# Patient Record
Sex: MALE | Race: WHITE | NOT HISPANIC OR LATINO | Employment: PART TIME | URBAN - NONMETROPOLITAN AREA
[De-identification: names, ages, dates, MRNs, and addresses within clinical notes are randomized per-mention and may not be internally consistent; named-entity substitution may affect disease eponyms.]

---

## 2017-09-30 ENCOUNTER — APPOINTMENT (OUTPATIENT)
Dept: ULTRASOUND IMAGING | Facility: HOSPITAL | Age: 42
End: 2017-09-30

## 2017-09-30 ENCOUNTER — APPOINTMENT (OUTPATIENT)
Dept: GENERAL RADIOLOGY | Facility: HOSPITAL | Age: 42
End: 2017-09-30

## 2017-09-30 ENCOUNTER — HOSPITAL ENCOUNTER (OUTPATIENT)
Facility: HOSPITAL | Age: 42
Setting detail: OBSERVATION
Discharge: HOME | End: 2017-10-02
Attending: EMERGENCY MEDICINE | Admitting: INTERNAL MEDICINE

## 2017-09-30 DIAGNOSIS — I20.0 UNSTABLE ANGINA (HCC): ICD-10-CM

## 2017-09-30 DIAGNOSIS — R07.9 CHEST PAIN, UNSPECIFIED TYPE: Primary | ICD-10-CM

## 2017-09-30 PROBLEM — I25.2 HISTORY OF MYOCARDIAL INFARCTION: Status: ACTIVE | Noted: 2017-09-30

## 2017-09-30 LAB
ALBUMIN SERPL-MCNC: 4.3 G/DL (ref 3.5–5)
ALBUMIN/GLOB SERPL: 1.3 G/DL (ref 1–2)
ALP SERPL-CCNC: 89 U/L (ref 38–126)
ALT SERPL W P-5'-P-CCNC: 55 U/L (ref 13–69)
ANION GAP SERPL CALCULATED.3IONS-SCNC: 17.4 MMOL/L
AST SERPL-CCNC: 31 U/L (ref 15–46)
BASOPHILS # BLD AUTO: 0.04 10*3/MM3 (ref 0–0.2)
BASOPHILS NFR BLD AUTO: 0.6 % (ref 0–2.5)
BILIRUB SERPL-MCNC: 0.7 MG/DL (ref 0.2–1.3)
BUN BLD-MCNC: 16 MG/DL (ref 7–20)
BUN/CREAT SERPL: 20 (ref 6.3–21.9)
CALCIUM SPEC-SCNC: 9.3 MG/DL (ref 8.4–10.2)
CHLORIDE SERPL-SCNC: 101 MMOL/L (ref 98–107)
CHOLEST SERPL-MCNC: 193 MG/DL (ref 0–199)
CO2 SERPL-SCNC: 28 MMOL/L (ref 26–30)
CREAT BLD-MCNC: 0.8 MG/DL (ref 0.6–1.3)
DEPRECATED RDW RBC AUTO: 40.8 FL (ref 37–54)
EOSINOPHIL # BLD AUTO: 0.26 10*3/MM3 (ref 0–0.7)
EOSINOPHIL NFR BLD AUTO: 3.6 % (ref 0–7)
ERYTHROCYTE [DISTWIDTH] IN BLOOD BY AUTOMATED COUNT: 13.2 % (ref 11.5–14.5)
GFR SERPL CREATININE-BSD FRML MDRD: 105 ML/MIN/1.73
GLOBULIN UR ELPH-MCNC: 3.3 GM/DL
GLUCOSE BLD-MCNC: 209 MG/DL (ref 74–98)
HCT VFR BLD AUTO: 46 % (ref 42–52)
HDLC SERPL-MCNC: 28 MG/DL (ref 40–60)
HGB BLD-MCNC: 15.4 G/DL (ref 14–18)
HOLD SPECIMEN: NORMAL
HOLD SPECIMEN: NORMAL
IMM GRANULOCYTES # BLD: 0.06 10*3/MM3 (ref 0–0.06)
IMM GRANULOCYTES NFR BLD: 0.8 % (ref 0–0.6)
LDLC SERPL CALC-MCNC: ABNORMAL MG/DL (ref 0–99)
LDLC/HDLC SERPL: ABNORMAL {RATIO}
LYMPHOCYTES # BLD AUTO: 1.54 10*3/MM3 (ref 0.6–3.4)
LYMPHOCYTES NFR BLD AUTO: 21.2 % (ref 10–50)
MAGNESIUM SERPL-MCNC: 1.8 MG/DL (ref 1.6–2.3)
MCH RBC QN AUTO: 29.1 PG (ref 27–31)
MCHC RBC AUTO-ENTMCNC: 33.5 G/DL (ref 30–37)
MCV RBC AUTO: 86.8 FL (ref 80–94)
MONOCYTES # BLD AUTO: 0.73 10*3/MM3 (ref 0–0.9)
MONOCYTES NFR BLD AUTO: 10.1 % (ref 0–12)
NEUTROPHILS # BLD AUTO: 4.63 10*3/MM3 (ref 2–6.9)
NEUTROPHILS NFR BLD AUTO: 63.7 % (ref 37–80)
NRBC BLD MANUAL-RTO: 0 /100 WBC (ref 0–0)
NT-PROBNP SERPL-MCNC: 57.9 PG/ML (ref 0–125)
PLATELET # BLD AUTO: 173 10*3/MM3 (ref 130–400)
PMV BLD AUTO: 11.5 FL (ref 6–12)
POTASSIUM BLD-SCNC: 4.4 MMOL/L (ref 3.5–5.1)
PROT SERPL-MCNC: 7.6 G/DL (ref 6.3–8.2)
RBC # BLD AUTO: 5.3 10*6/MM3 (ref 4.7–6.1)
SODIUM BLD-SCNC: 142 MMOL/L (ref 137–145)
TRIGL SERPL-MCNC: 1051 MG/DL
TROPONIN I SERPL-MCNC: 0.01 NG/ML (ref 0–0.05)
TROPONIN I SERPL-MCNC: <0.012 NG/ML (ref 0–0.03)
TROPONIN I SERPL-MCNC: <0.012 NG/ML (ref 0–0.03)
TSH SERPL DL<=0.05 MIU/L-ACNC: 0.86 MIU/ML (ref 0.47–4.68)
VLDLC SERPL-MCNC: ABNORMAL MG/DL
WBC NRBC COR # BLD: 7.26 10*3/MM3 (ref 4.8–10.8)
WHOLE BLOOD HOLD SPECIMEN: NORMAL
WHOLE BLOOD HOLD SPECIMEN: NORMAL

## 2017-09-30 PROCEDURE — 93005 ELECTROCARDIOGRAM TRACING: CPT | Performed by: EMERGENCY MEDICINE

## 2017-09-30 PROCEDURE — 85025 COMPLETE CBC W/AUTO DIFF WBC: CPT | Performed by: EMERGENCY MEDICINE

## 2017-09-30 PROCEDURE — 96375 TX/PRO/DX INJ NEW DRUG ADDON: CPT

## 2017-09-30 PROCEDURE — 25010000002 HEPARIN (PORCINE) PER 1000 UNITS: Performed by: INTERNAL MEDICINE

## 2017-09-30 PROCEDURE — G0378 HOSPITAL OBSERVATION PER HR: HCPCS

## 2017-09-30 PROCEDURE — 0 IOPAMIDOL PER 1 ML

## 2017-09-30 PROCEDURE — 25010000002 ENOXAPARIN PER 10 MG: Performed by: EMERGENCY MEDICINE

## 2017-09-30 PROCEDURE — 25010000002 MORPHINE PER 10 MG: Performed by: FAMILY MEDICINE

## 2017-09-30 PROCEDURE — 80061 LIPID PANEL: CPT | Performed by: FAMILY MEDICINE

## 2017-09-30 PROCEDURE — 84443 ASSAY THYROID STIM HORMONE: CPT | Performed by: FAMILY MEDICINE

## 2017-09-30 PROCEDURE — 96376 TX/PRO/DX INJ SAME DRUG ADON: CPT

## 2017-09-30 PROCEDURE — 84484 ASSAY OF TROPONIN QUANT: CPT | Performed by: EMERGENCY MEDICINE

## 2017-09-30 PROCEDURE — 93458 L HRT ARTERY/VENTRICLE ANGIO: CPT | Performed by: INTERNAL MEDICINE

## 2017-09-30 PROCEDURE — 99204 OFFICE O/P NEW MOD 45 MIN: CPT | Performed by: INTERNAL MEDICINE

## 2017-09-30 PROCEDURE — 84484 ASSAY OF TROPONIN QUANT: CPT | Performed by: FAMILY MEDICINE

## 2017-09-30 PROCEDURE — 83735 ASSAY OF MAGNESIUM: CPT | Performed by: EMERGENCY MEDICINE

## 2017-09-30 PROCEDURE — 25010000002 ONDANSETRON PER 1 MG: Performed by: EMERGENCY MEDICINE

## 2017-09-30 PROCEDURE — 99284 EMERGENCY DEPT VISIT MOD MDM: CPT

## 2017-09-30 PROCEDURE — 25010000002 FUROSEMIDE PER 20 MG: Performed by: FAMILY MEDICINE

## 2017-09-30 PROCEDURE — 25010000002 FENTANYL CITRATE (PF) 100 MCG/2ML SOLUTION: Performed by: INTERNAL MEDICINE

## 2017-09-30 PROCEDURE — 25010000002 MIDAZOLAM PER 1 MG: Performed by: INTERNAL MEDICINE

## 2017-09-30 PROCEDURE — 25010000002 MORPHINE PER 10 MG: Performed by: EMERGENCY MEDICINE

## 2017-09-30 PROCEDURE — 93005 ELECTROCARDIOGRAM TRACING: CPT | Performed by: FAMILY MEDICINE

## 2017-09-30 PROCEDURE — C1769 GUIDE WIRE: HCPCS | Performed by: INTERNAL MEDICINE

## 2017-09-30 PROCEDURE — 83880 ASSAY OF NATRIURETIC PEPTIDE: CPT | Performed by: FAMILY MEDICINE

## 2017-09-30 PROCEDURE — C1894 INTRO/SHEATH, NON-LASER: HCPCS | Performed by: INTERNAL MEDICINE

## 2017-09-30 PROCEDURE — 71010 HC CHEST PA OR AP: CPT

## 2017-09-30 PROCEDURE — 0 IOPAMIDOL PER 1 ML: Performed by: INTERNAL MEDICINE

## 2017-09-30 PROCEDURE — 83036 HEMOGLOBIN GLYCOSYLATED A1C: CPT | Performed by: FAMILY MEDICINE

## 2017-09-30 PROCEDURE — 96372 THER/PROPH/DIAG INJ SC/IM: CPT

## 2017-09-30 PROCEDURE — 96374 THER/PROPH/DIAG INJ IV PUSH: CPT

## 2017-09-30 PROCEDURE — 80053 COMPREHEN METABOLIC PANEL: CPT | Performed by: EMERGENCY MEDICINE

## 2017-09-30 PROCEDURE — 99219 PR INITIAL OBSERVATION CARE/DAY 50 MINUTES: CPT | Performed by: FAMILY MEDICINE

## 2017-09-30 PROCEDURE — 93970 EXTREMITY STUDY: CPT

## 2017-09-30 RX ORDER — SODIUM CHLORIDE 0.9 % (FLUSH) 0.9 %
1-10 SYRINGE (ML) INJECTION AS NEEDED
Status: DISCONTINUED | OUTPATIENT
Start: 2017-09-30 | End: 2017-10-02 | Stop reason: HOSPADM

## 2017-09-30 RX ORDER — ONDANSETRON 4 MG/1
4 TABLET, ORALLY DISINTEGRATING ORAL EVERY 6 HOURS PRN
Status: DISCONTINUED | OUTPATIENT
Start: 2017-09-30 | End: 2017-10-02 | Stop reason: HOSPADM

## 2017-09-30 RX ORDER — CETIRIZINE HYDROCHLORIDE 10 MG/1
10 TABLET ORAL DAILY
Status: DISCONTINUED | OUTPATIENT
Start: 2017-10-01 | End: 2017-10-02 | Stop reason: HOSPADM

## 2017-09-30 RX ORDER — CARVEDILOL 12.5 MG/1
12.5 TABLET ORAL 2 TIMES DAILY
Status: DISCONTINUED | OUTPATIENT
Start: 2017-09-30 | End: 2017-10-02 | Stop reason: HOSPADM

## 2017-09-30 RX ORDER — LISINOPRIL 20 MG/1
40 TABLET ORAL DAILY
Status: DISCONTINUED | OUTPATIENT
Start: 2017-10-01 | End: 2017-10-02 | Stop reason: HOSPADM

## 2017-09-30 RX ORDER — AMLODIPINE BESYLATE 5 MG/1
10 TABLET ORAL DAILY
Status: DISCONTINUED | OUTPATIENT
Start: 2017-10-01 | End: 2017-10-02 | Stop reason: HOSPADM

## 2017-09-30 RX ORDER — ACETAMINOPHEN 325 MG/1
650 TABLET ORAL EVERY 4 HOURS PRN
Status: DISCONTINUED | OUTPATIENT
Start: 2017-09-30 | End: 2017-10-02 | Stop reason: HOSPADM

## 2017-09-30 RX ORDER — BISACODYL 10 MG
10 SUPPOSITORY, RECTAL RECTAL DAILY PRN
Status: DISCONTINUED | OUTPATIENT
Start: 2017-09-30 | End: 2017-10-02 | Stop reason: HOSPADM

## 2017-09-30 RX ORDER — LIDOCAINE HYDROCHLORIDE 10 MG/ML
INJECTION, SOLUTION INFILTRATION; PERINEURAL AS NEEDED
Status: DISCONTINUED | OUTPATIENT
Start: 2017-09-30 | End: 2017-09-30 | Stop reason: HOSPADM

## 2017-09-30 RX ORDER — NICOTINE POLACRILEX 4 MG
1 LOZENGE BUCCAL
Status: DISCONTINUED | OUTPATIENT
Start: 2017-09-30 | End: 2017-10-02 | Stop reason: HOSPADM

## 2017-09-30 RX ORDER — MIDAZOLAM HYDROCHLORIDE 1 MG/ML
INJECTION INTRAMUSCULAR; INTRAVENOUS AS NEEDED
Status: DISCONTINUED | OUTPATIENT
Start: 2017-09-30 | End: 2017-09-30 | Stop reason: HOSPADM

## 2017-09-30 RX ORDER — MORPHINE SULFATE 2 MG/ML
2 INJECTION, SOLUTION INTRAMUSCULAR; INTRAVENOUS
Status: DISCONTINUED | OUTPATIENT
Start: 2017-09-30 | End: 2017-10-02 | Stop reason: HOSPADM

## 2017-09-30 RX ORDER — ONDANSETRON 4 MG/1
4 TABLET, FILM COATED ORAL EVERY 6 HOURS PRN
Status: DISCONTINUED | OUTPATIENT
Start: 2017-09-30 | End: 2017-10-02 | Stop reason: HOSPADM

## 2017-09-30 RX ORDER — CITALOPRAM 20 MG/1
20 TABLET ORAL DAILY
Status: DISCONTINUED | OUTPATIENT
Start: 2017-10-01 | End: 2017-10-02 | Stop reason: HOSPADM

## 2017-09-30 RX ORDER — ONDANSETRON 2 MG/ML
4 INJECTION INTRAMUSCULAR; INTRAVENOUS ONCE
Status: COMPLETED | OUTPATIENT
Start: 2017-09-30 | End: 2017-09-30

## 2017-09-30 RX ORDER — ALBUTEROL SULFATE 2.5 MG/3ML
2.5 SOLUTION RESPIRATORY (INHALATION) EVERY 6 HOURS PRN
Status: DISCONTINUED | OUTPATIENT
Start: 2017-09-30 | End: 2017-10-02 | Stop reason: HOSPADM

## 2017-09-30 RX ORDER — DEXTROSE MONOHYDRATE 25 G/50ML
25 INJECTION, SOLUTION INTRAVENOUS
Status: DISCONTINUED | OUTPATIENT
Start: 2017-09-30 | End: 2017-10-02 | Stop reason: HOSPADM

## 2017-09-30 RX ORDER — ONDANSETRON 2 MG/ML
4 INJECTION INTRAMUSCULAR; INTRAVENOUS EVERY 6 HOURS PRN
Status: DISCONTINUED | OUTPATIENT
Start: 2017-09-30 | End: 2017-10-02 | Stop reason: HOSPADM

## 2017-09-30 RX ORDER — ATORVASTATIN CALCIUM 20 MG/1
20 TABLET, FILM COATED ORAL DAILY
Status: DISCONTINUED | OUTPATIENT
Start: 2017-09-30 | End: 2017-09-30 | Stop reason: SDUPTHER

## 2017-09-30 RX ORDER — NALOXONE HCL 0.4 MG/ML
0.4 VIAL (ML) INJECTION
Status: DISCONTINUED | OUTPATIENT
Start: 2017-09-30 | End: 2017-10-02 | Stop reason: HOSPADM

## 2017-09-30 RX ORDER — GABAPENTIN 300 MG/1
600 CAPSULE ORAL EVERY 12 HOURS SCHEDULED
Status: DISCONTINUED | OUTPATIENT
Start: 2017-09-30 | End: 2017-10-02 | Stop reason: HOSPADM

## 2017-09-30 RX ORDER — AMLODIPINE BESYLATE 5 MG/1
10 TABLET ORAL DAILY
Status: DISCONTINUED | OUTPATIENT
Start: 2017-10-02 | End: 2017-09-30

## 2017-09-30 RX ORDER — MORPHINE SULFATE 4 MG/ML
4 INJECTION, SOLUTION INTRAMUSCULAR; INTRAVENOUS ONCE
Status: COMPLETED | OUTPATIENT
Start: 2017-09-30 | End: 2017-09-30

## 2017-09-30 RX ORDER — ASPIRIN 325 MG
325 TABLET ORAL ONCE
Status: COMPLETED | OUTPATIENT
Start: 2017-09-30 | End: 2017-09-30

## 2017-09-30 RX ORDER — FUROSEMIDE 10 MG/ML
20 INJECTION INTRAMUSCULAR; INTRAVENOUS ONCE
Status: COMPLETED | OUTPATIENT
Start: 2017-09-30 | End: 2017-09-30

## 2017-09-30 RX ORDER — FAMOTIDINE 20 MG/1
20 TABLET, FILM COATED ORAL 2 TIMES DAILY
Status: DISCONTINUED | OUTPATIENT
Start: 2017-09-30 | End: 2017-10-02 | Stop reason: HOSPADM

## 2017-09-30 RX ORDER — NITROGLYCERIN 20 MG/100ML
5-200 INJECTION INTRAVENOUS
Status: DISCONTINUED | OUTPATIENT
Start: 2017-09-30 | End: 2017-09-30

## 2017-09-30 RX ORDER — SODIUM CHLORIDE 9 MG/ML
100 INJECTION, SOLUTION INTRAVENOUS CONTINUOUS
Status: DISCONTINUED | OUTPATIENT
Start: 2017-09-30 | End: 2017-10-02 | Stop reason: HOSPADM

## 2017-09-30 RX ORDER — SODIUM CHLORIDE 0.9 % (FLUSH) 0.9 %
10 SYRINGE (ML) INJECTION AS NEEDED
Status: DISCONTINUED | OUTPATIENT
Start: 2017-09-30 | End: 2017-10-02 | Stop reason: HOSPADM

## 2017-09-30 RX ORDER — FENTANYL CITRATE 50 UG/ML
INJECTION, SOLUTION INTRAMUSCULAR; INTRAVENOUS AS NEEDED
Status: DISCONTINUED | OUTPATIENT
Start: 2017-09-30 | End: 2017-09-30 | Stop reason: HOSPADM

## 2017-09-30 RX ORDER — ATORVASTATIN CALCIUM 10 MG/1
10 TABLET, FILM COATED ORAL NIGHTLY
Status: DISCONTINUED | OUTPATIENT
Start: 2017-10-01 | End: 2017-10-02 | Stop reason: HOSPADM

## 2017-09-30 RX ADMIN — NITROGLYCERIN 0.5 INCH: 20 OINTMENT TOPICAL at 11:03

## 2017-09-30 RX ADMIN — ONDANSETRON 4 MG: 2 INJECTION INTRAMUSCULAR; INTRAVENOUS at 11:02

## 2017-09-30 RX ADMIN — MORPHINE SULFATE 2 MG: 2 INJECTION, SOLUTION INTRAMUSCULAR; INTRAVENOUS at 14:25

## 2017-09-30 RX ADMIN — NITROGLYCERIN 1 INCH: 20 OINTMENT TOPICAL at 14:18

## 2017-09-30 RX ADMIN — FAMOTIDINE 20 MG: 20 TABLET, FILM COATED ORAL at 21:35

## 2017-09-30 RX ADMIN — LIDOCAINE HYDROCHLORIDE: 20 SOLUTION ORAL; TOPICAL at 15:11

## 2017-09-30 RX ADMIN — CARVEDILOL 12.5 MG: 12.5 TABLET, FILM COATED ORAL at 21:34

## 2017-09-30 RX ADMIN — ASPIRIN 325 MG ORAL TABLET 325 MG: 325 PILL ORAL at 08:38

## 2017-09-30 RX ADMIN — ENOXAPARIN SODIUM 140 MG: 150 INJECTION SUBCUTANEOUS at 11:03

## 2017-09-30 RX ADMIN — ATORVASTATIN CALCIUM 20 MG: 20 TABLET, FILM COATED ORAL at 12:34

## 2017-09-30 RX ADMIN — FUROSEMIDE 20 MG: 10 INJECTION, SOLUTION INTRAMUSCULAR; INTRAVENOUS at 12:34

## 2017-09-30 RX ADMIN — MORPHINE SULFATE 4 MG: 4 INJECTION, SOLUTION INTRAMUSCULAR; INTRAVENOUS at 11:03

## 2017-09-30 RX ADMIN — GABAPENTIN 600 MG: 300 CAPSULE ORAL at 21:34

## 2017-10-01 LAB
ANION GAP SERPL CALCULATED.3IONS-SCNC: 14.3 MMOL/L
BASOPHILS # BLD AUTO: 0.02 10*3/MM3 (ref 0–0.2)
BASOPHILS NFR BLD AUTO: 0.3 % (ref 0–2.5)
BUN BLD-MCNC: 14 MG/DL (ref 7–20)
BUN/CREAT SERPL: 17.5 (ref 6.3–21.9)
CALCIUM SPEC-SCNC: 9 MG/DL (ref 8.4–10.2)
CHLORIDE SERPL-SCNC: 102 MMOL/L (ref 98–107)
CO2 SERPL-SCNC: 30 MMOL/L (ref 26–30)
CREAT BLD-MCNC: 0.8 MG/DL (ref 0.6–1.3)
DEPRECATED RDW RBC AUTO: 43.2 FL (ref 37–54)
EOSINOPHIL # BLD AUTO: 0.18 10*3/MM3 (ref 0–0.7)
EOSINOPHIL NFR BLD AUTO: 2.7 % (ref 0–7)
ERYTHROCYTE [DISTWIDTH] IN BLOOD BY AUTOMATED COUNT: 13.4 % (ref 11.5–14.5)
GFR SERPL CREATININE-BSD FRML MDRD: 105 ML/MIN/1.73
GLUCOSE BLD-MCNC: 174 MG/DL (ref 74–98)
GLUCOSE BLDC GLUCOMTR-MCNC: 136 MG/DL (ref 70–130)
GLUCOSE BLDC GLUCOMTR-MCNC: 150 MG/DL (ref 70–130)
GLUCOSE BLDC GLUCOMTR-MCNC: 176 MG/DL (ref 70–130)
GLUCOSE BLDC GLUCOMTR-MCNC: 181 MG/DL (ref 70–130)
GLUCOSE BLDC GLUCOMTR-MCNC: 217 MG/DL (ref 70–130)
HCT VFR BLD AUTO: 44.9 % (ref 42–52)
HGB BLD-MCNC: 14.8 G/DL (ref 14–18)
IMM GRANULOCYTES # BLD: 0.03 10*3/MM3 (ref 0–0.06)
IMM GRANULOCYTES NFR BLD: 0.4 % (ref 0–0.6)
LIPASE SERPL-CCNC: 85 U/L (ref 23–300)
LYMPHOCYTES # BLD AUTO: 1.52 10*3/MM3 (ref 0.6–3.4)
LYMPHOCYTES NFR BLD AUTO: 22.5 % (ref 10–50)
MCH RBC QN AUTO: 29.2 PG (ref 27–31)
MCHC RBC AUTO-ENTMCNC: 33 G/DL (ref 30–37)
MCV RBC AUTO: 88.6 FL (ref 80–94)
MONOCYTES # BLD AUTO: 0.73 10*3/MM3 (ref 0–0.9)
MONOCYTES NFR BLD AUTO: 10.8 % (ref 0–12)
NEUTROPHILS # BLD AUTO: 4.27 10*3/MM3 (ref 2–6.9)
NEUTROPHILS NFR BLD AUTO: 63.3 % (ref 37–80)
NRBC BLD MANUAL-RTO: 0 /100 WBC (ref 0–0)
PLATELET # BLD AUTO: 160 10*3/MM3 (ref 130–400)
PMV BLD AUTO: 11.4 FL (ref 6–12)
POTASSIUM BLD-SCNC: 4.3 MMOL/L (ref 3.5–5.1)
RBC # BLD AUTO: 5.07 10*6/MM3 (ref 4.7–6.1)
SODIUM BLD-SCNC: 142 MMOL/L (ref 137–145)
WBC NRBC COR # BLD: 6.75 10*3/MM3 (ref 4.8–10.8)

## 2017-10-01 PROCEDURE — G0378 HOSPITAL OBSERVATION PER HR: HCPCS

## 2017-10-01 PROCEDURE — 63710000001 INSULIN ASPART PER 5 UNITS: Performed by: FAMILY MEDICINE

## 2017-10-01 PROCEDURE — 25010000002 KETOROLAC TROMETHAMINE PER 15 MG: Performed by: FAMILY MEDICINE

## 2017-10-01 PROCEDURE — 25010000002 ENOXAPARIN PER 10 MG: Performed by: FAMILY MEDICINE

## 2017-10-01 PROCEDURE — 85025 COMPLETE CBC W/AUTO DIFF WBC: CPT | Performed by: FAMILY MEDICINE

## 2017-10-01 PROCEDURE — 83690 ASSAY OF LIPASE: CPT | Performed by: FAMILY MEDICINE

## 2017-10-01 PROCEDURE — 82962 GLUCOSE BLOOD TEST: CPT

## 2017-10-01 PROCEDURE — 80048 BASIC METABOLIC PNL TOTAL CA: CPT | Performed by: FAMILY MEDICINE

## 2017-10-01 RX ORDER — KETOROLAC TROMETHAMINE 30 MG/ML
30 INJECTION, SOLUTION INTRAMUSCULAR; INTRAVENOUS ONCE
Status: COMPLETED | OUTPATIENT
Start: 2017-10-01 | End: 2017-10-01

## 2017-10-01 RX ORDER — KETOROLAC TROMETHAMINE 30 MG/ML
15 INJECTION, SOLUTION INTRAMUSCULAR; INTRAVENOUS EVERY 6 HOURS PRN
Status: DISCONTINUED | OUTPATIENT
Start: 2017-10-01 | End: 2017-10-02 | Stop reason: HOSPADM

## 2017-10-01 RX ADMIN — FAMOTIDINE 20 MG: 20 TABLET, FILM COATED ORAL at 09:46

## 2017-10-01 RX ADMIN — FAMOTIDINE 20 MG: 20 TABLET, FILM COATED ORAL at 20:43

## 2017-10-01 RX ADMIN — ATORVASTATIN CALCIUM 10 MG: 10 TABLET, FILM COATED ORAL at 20:43

## 2017-10-01 RX ADMIN — LISINOPRIL 40 MG: 20 TABLET ORAL at 09:46

## 2017-10-01 RX ADMIN — CETIRIZINE HYDROCHLORIDE 10 MG: 10 TABLET, FILM COATED ORAL at 09:46

## 2017-10-01 RX ADMIN — GABAPENTIN 600 MG: 300 CAPSULE ORAL at 09:46

## 2017-10-01 RX ADMIN — NITROGLYCERIN 1 INCH: 20 OINTMENT TOPICAL at 00:01

## 2017-10-01 RX ADMIN — AMLODIPINE BESYLATE 10 MG: 5 TABLET ORAL at 09:45

## 2017-10-01 RX ADMIN — CARVEDILOL 12.5 MG: 12.5 TABLET, FILM COATED ORAL at 20:43

## 2017-10-01 RX ADMIN — CARVEDILOL 12.5 MG: 12.5 TABLET, FILM COATED ORAL at 09:46

## 2017-10-01 RX ADMIN — SODIUM CHLORIDE 100 ML/HR: 9 INJECTION, SOLUTION INTRAVENOUS at 06:20

## 2017-10-01 RX ADMIN — CITALOPRAM HYDROBROMIDE 20 MG: 20 TABLET, FILM COATED ORAL at 09:46

## 2017-10-01 RX ADMIN — GABAPENTIN 600 MG: 300 CAPSULE ORAL at 20:43

## 2017-10-01 RX ADMIN — NITROGLYCERIN 1 INCH: 20 OINTMENT TOPICAL at 06:20

## 2017-10-01 RX ADMIN — KETOROLAC TROMETHAMINE 30 MG: 30 INJECTION, SOLUTION INTRAMUSCULAR at 11:10

## 2017-10-01 RX ADMIN — ENOXAPARIN SODIUM 40 MG: 40 INJECTION SUBCUTANEOUS at 09:46

## 2017-10-01 RX ADMIN — INSULIN ASPART 2 UNITS: 100 INJECTION, SOLUTION INTRAVENOUS; SUBCUTANEOUS at 12:00

## 2017-10-02 ENCOUNTER — APPOINTMENT (OUTPATIENT)
Dept: CARDIOLOGY | Facility: HOSPITAL | Age: 42
End: 2017-10-02
Attending: FAMILY MEDICINE

## 2017-10-02 VITALS
SYSTOLIC BLOOD PRESSURE: 134 MMHG | DIASTOLIC BLOOD PRESSURE: 94 MMHG | OXYGEN SATURATION: 97 % | RESPIRATION RATE: 18 BRPM | HEIGHT: 76 IN | TEMPERATURE: 98.3 F | WEIGHT: 315 LBS | HEART RATE: 70 BPM | BODY MASS INDEX: 38.36 KG/M2

## 2017-10-02 PROBLEM — K22.4 ESOPHAGEAL SPASM: Status: ACTIVE | Noted: 2017-10-02

## 2017-10-02 PROBLEM — I20.0 UNSTABLE ANGINA (HCC): Status: RESOLVED | Noted: 2017-09-30 | Resolved: 2017-10-02

## 2017-10-02 PROBLEM — J45.40: Status: ACTIVE | Noted: 2017-10-02

## 2017-10-02 PROBLEM — I10 ESSENTIAL HYPERTENSION: Chronic | Status: ACTIVE | Noted: 2017-10-02

## 2017-10-02 PROBLEM — R07.89 OTHER CHEST PAIN: Status: ACTIVE | Noted: 2017-10-02

## 2017-10-02 LAB
GLUCOSE BLDC GLUCOMTR-MCNC: 154 MG/DL (ref 70–130)
HBA1C MFR BLD: 7.6 % (ref 3–6)

## 2017-10-02 PROCEDURE — 90661 CCIIV3 VAC ABX FR 0.5 ML IM: CPT | Performed by: FAMILY MEDICINE

## 2017-10-02 PROCEDURE — 99217 PR OBSERVATION CARE DISCHARGE MANAGEMENT: CPT | Performed by: FAMILY MEDICINE

## 2017-10-02 PROCEDURE — 82962 GLUCOSE BLOOD TEST: CPT

## 2017-10-02 PROCEDURE — 25010000002 PNEUMOCOCCAL VAC POLYVALENT PER 0.5 ML: Performed by: FAMILY MEDICINE

## 2017-10-02 PROCEDURE — G0009 ADMIN PNEUMOCOCCAL VACCINE: HCPCS | Performed by: FAMILY MEDICINE

## 2017-10-02 PROCEDURE — G0008 ADMIN INFLUENZA VIRUS VAC: HCPCS | Performed by: FAMILY MEDICINE

## 2017-10-02 PROCEDURE — 90732 PPSV23 VACC 2 YRS+ SUBQ/IM: CPT | Performed by: FAMILY MEDICINE

## 2017-10-02 PROCEDURE — 25010000002 INFLUENZA VAC SUBUNIT QUAD 0.5 ML SUSPENSION PREFILLED SYRINGE: Performed by: FAMILY MEDICINE

## 2017-10-02 PROCEDURE — G0378 HOSPITAL OBSERVATION PER HR: HCPCS

## 2017-10-02 RX ORDER — NIFEDIPINE 30 MG/1
30 TABLET, EXTENDED RELEASE ORAL DAILY
Qty: 30 TABLET | Refills: 0 | Status: SHIPPED | OUTPATIENT
Start: 2017-10-02 | End: 2018-05-07

## 2017-10-02 RX ORDER — BUDESONIDE AND FORMOTEROL FUMARATE DIHYDRATE 160; 4.5 UG/1; UG/1
2 AEROSOL RESPIRATORY (INHALATION)
Qty: 10.2 G | Refills: 0 | Status: SHIPPED | OUTPATIENT
Start: 2017-10-02 | End: 2019-10-30

## 2017-10-02 RX ADMIN — LISINOPRIL 40 MG: 20 TABLET ORAL at 09:28

## 2017-10-02 RX ADMIN — PNEUMOCOCCAL VACCINE POLYVALENT 0.5 ML
25; 25; 25; 25; 25; 25; 25; 25; 25; 25; 25; 25; 25; 25; 25; 25; 25; 25; 25; 25; 25; 25; 25 INJECTION, SOLUTION INTRAMUSCULAR; SUBCUTANEOUS at 11:19

## 2017-10-02 RX ADMIN — FAMOTIDINE 20 MG: 20 TABLET, FILM COATED ORAL at 09:28

## 2017-10-02 RX ADMIN — CITALOPRAM HYDROBROMIDE 20 MG: 20 TABLET, FILM COATED ORAL at 09:27

## 2017-10-02 RX ADMIN — AMLODIPINE BESYLATE 10 MG: 5 TABLET ORAL at 09:27

## 2017-10-02 RX ADMIN — CARVEDILOL 12.5 MG: 12.5 TABLET, FILM COATED ORAL at 09:28

## 2017-10-02 RX ADMIN — GABAPENTIN 600 MG: 300 CAPSULE ORAL at 09:27

## 2017-10-02 RX ADMIN — CETIRIZINE HYDROCHLORIDE 10 MG: 10 TABLET, FILM COATED ORAL at 09:27

## 2017-10-02 RX ADMIN — A/SINGAPORE/GP1908/2015 IVR-180 (H1N1) (AN A/MICHIGAN/45/2015-LIKE VIRUS), A/SINGAPORE/GP2050/2015 (H3N2) (AN A/HONG KONG/4801/2014 - LIKE VIRUS), B/UTAH/9/2014 (A B/PHUKET/3073/2013-LIKE VIRUS), B/HONG KONG/259/2010 (A B/BRISBANE/60/08-LIKE VIRUS) 0.5 ML: 15; 15; 15; 15 INJECTION, SUSPENSION INTRAMUSCULAR at 11:23

## 2017-10-31 ENCOUNTER — HOSPITAL ENCOUNTER (EMERGENCY)
Facility: HOSPITAL | Age: 42
Discharge: ANOTHER HOSPITAL | End: 2017-10-31
Attending: EMERGENCY MEDICINE | Admitting: EMERGENCY MEDICINE

## 2017-10-31 ENCOUNTER — APPOINTMENT (OUTPATIENT)
Dept: MRI IMAGING | Facility: HOSPITAL | Age: 42
End: 2017-10-31

## 2017-10-31 ENCOUNTER — APPOINTMENT (OUTPATIENT)
Dept: CT IMAGING | Facility: HOSPITAL | Age: 42
End: 2017-10-31

## 2017-10-31 ENCOUNTER — HOSPITAL ENCOUNTER (INPATIENT)
Facility: HOSPITAL | Age: 42
LOS: 1 days | Discharge: HOME | End: 2017-11-01
Attending: INTERNAL MEDICINE | Admitting: HOSPITALIST

## 2017-10-31 VITALS
HEIGHT: 76 IN | WEIGHT: 315 LBS | RESPIRATION RATE: 20 BRPM | HEART RATE: 64 BPM | DIASTOLIC BLOOD PRESSURE: 83 MMHG | SYSTOLIC BLOOD PRESSURE: 124 MMHG | TEMPERATURE: 98 F | OXYGEN SATURATION: 99 % | BODY MASS INDEX: 38.36 KG/M2

## 2017-10-31 DIAGNOSIS — Z74.09 IMPAIRED MOBILITY AND ADLS: ICD-10-CM

## 2017-10-31 DIAGNOSIS — R29.90 STROKE-LIKE EPISODE: Primary | ICD-10-CM

## 2017-10-31 DIAGNOSIS — Z74.09 IMPAIRED FUNCTIONAL MOBILITY, BALANCE, GAIT, AND ENDURANCE: ICD-10-CM

## 2017-10-31 DIAGNOSIS — R13.11 ORAL PHASE DYSPHAGIA: Primary | ICD-10-CM

## 2017-10-31 DIAGNOSIS — Z78.9 IMPAIRED MOBILITY AND ADLS: ICD-10-CM

## 2017-10-31 PROBLEM — R29.898 WEAKNESS OF LEFT LEG: Status: ACTIVE | Noted: 2017-10-31

## 2017-10-31 PROBLEM — E11.9 TYPE 2 DIABETES MELLITUS (HCC): Status: ACTIVE | Noted: 2017-10-31

## 2017-10-31 PROBLEM — G51.0 FACIAL PALSY: Status: ACTIVE | Noted: 2017-10-31

## 2017-10-31 PROBLEM — R29.810 FACIAL DROOP: Status: ACTIVE | Noted: 2017-10-31

## 2017-10-31 PROBLEM — F32.A DEPRESSION: Status: ACTIVE | Noted: 2017-10-31

## 2017-10-31 LAB
ALBUMIN SERPL-MCNC: 4.2 G/DL (ref 3.5–5)
ALBUMIN/GLOB SERPL: 1.3 G/DL (ref 1–2)
ALP SERPL-CCNC: 73 U/L (ref 38–126)
ALT SERPL W P-5'-P-CCNC: 39 U/L (ref 13–69)
ANION GAP SERPL CALCULATED.3IONS-SCNC: 16.8 MMOL/L
AST SERPL-CCNC: 39 U/L (ref 15–46)
BASOPHILS # BLD AUTO: 0.02 10*3/MM3 (ref 0–0.2)
BASOPHILS NFR BLD AUTO: 0.3 % (ref 0–2.5)
BILIRUB SERPL-MCNC: 0.6 MG/DL (ref 0.2–1.3)
BUN BLD-MCNC: 22 MG/DL (ref 7–20)
BUN/CREAT SERPL: 24.4 (ref 6.3–21.9)
CALCIUM SPEC-SCNC: 9.5 MG/DL (ref 8.4–10.2)
CHLORIDE SERPL-SCNC: 107 MMOL/L (ref 98–107)
CO2 SERPL-SCNC: 23 MMOL/L (ref 26–30)
CREAT BLD-MCNC: 0.9 MG/DL (ref 0.6–1.3)
DEPRECATED RDW RBC AUTO: 41.6 FL (ref 37–54)
EOSINOPHIL # BLD AUTO: 0.26 10*3/MM3 (ref 0–0.7)
EOSINOPHIL NFR BLD AUTO: 3.6 % (ref 0–7)
ERYTHROCYTE [DISTWIDTH] IN BLOOD BY AUTOMATED COUNT: 13.2 % (ref 11.5–14.5)
GFR SERPL CREATININE-BSD FRML MDRD: 92 ML/MIN/1.73
GLOBULIN UR ELPH-MCNC: 3.2 GM/DL
GLUCOSE BLD-MCNC: 215 MG/DL (ref 74–98)
GLUCOSE BLDC GLUCOMTR-MCNC: 115 MG/DL (ref 70–130)
GLUCOSE BLDC GLUCOMTR-MCNC: 154 MG/DL (ref 70–130)
GLUCOSE BLDC GLUCOMTR-MCNC: 229 MG/DL (ref 70–130)
HCT VFR BLD AUTO: 45.3 % (ref 42–52)
HGB BLD-MCNC: 15.5 G/DL (ref 14–18)
HOLD SPECIMEN: NORMAL
IMM GRANULOCYTES # BLD: 0.06 10*3/MM3 (ref 0–0.06)
IMM GRANULOCYTES NFR BLD: 0.8 % (ref 0–0.6)
LYMPHOCYTES # BLD AUTO: 1.67 10*3/MM3 (ref 0.6–3.4)
LYMPHOCYTES NFR BLD AUTO: 23.2 % (ref 10–50)
MCH RBC QN AUTO: 29.8 PG (ref 27–31)
MCHC RBC AUTO-ENTMCNC: 34.2 G/DL (ref 30–37)
MCV RBC AUTO: 86.9 FL (ref 80–94)
MONOCYTES # BLD AUTO: 0.71 10*3/MM3 (ref 0–0.9)
MONOCYTES NFR BLD AUTO: 9.8 % (ref 0–12)
NEUTROPHILS # BLD AUTO: 4.49 10*3/MM3 (ref 2–6.9)
NEUTROPHILS NFR BLD AUTO: 62.3 % (ref 37–80)
NRBC BLD MANUAL-RTO: 0 /100 WBC (ref 0–0)
PLATELET # BLD AUTO: 162 10*3/MM3 (ref 130–400)
PMV BLD AUTO: 11.5 FL (ref 6–12)
POTASSIUM BLD-SCNC: 4.8 MMOL/L (ref 3.5–5.1)
PROT SERPL-MCNC: 7.4 G/DL (ref 6.3–8.2)
RBC # BLD AUTO: 5.21 10*6/MM3 (ref 4.7–6.1)
SODIUM BLD-SCNC: 142 MMOL/L (ref 137–145)
WBC NRBC COR # BLD: 7.21 10*3/MM3 (ref 4.8–10.8)
WHOLE BLOOD HOLD SPECIMEN: NORMAL

## 2017-10-31 PROCEDURE — 99285 EMERGENCY DEPT VISIT HI MDM: CPT

## 2017-10-31 PROCEDURE — 70450 CT HEAD/BRAIN W/O DYE: CPT

## 2017-10-31 PROCEDURE — 70551 MRI BRAIN STEM W/O DYE: CPT

## 2017-10-31 PROCEDURE — 63710000001 INSULIN LISPRO (HUMAN) PER 5 UNITS: Performed by: NURSE PRACTITIONER

## 2017-10-31 PROCEDURE — 80053 COMPREHEN METABOLIC PANEL: CPT | Performed by: EMERGENCY MEDICINE

## 2017-10-31 PROCEDURE — 99223 1ST HOSP IP/OBS HIGH 75: CPT | Performed by: HOSPITALIST

## 2017-10-31 PROCEDURE — 94640 AIRWAY INHALATION TREATMENT: CPT

## 2017-10-31 PROCEDURE — 85025 COMPLETE CBC W/AUTO DIFF WBC: CPT | Performed by: EMERGENCY MEDICINE

## 2017-10-31 PROCEDURE — 82962 GLUCOSE BLOOD TEST: CPT

## 2017-10-31 PROCEDURE — 93005 ELECTROCARDIOGRAM TRACING: CPT | Performed by: EMERGENCY MEDICINE

## 2017-10-31 RX ORDER — IBUPROFEN 600 MG/1
600 TABLET ORAL ONCE
Status: DISCONTINUED | OUTPATIENT
Start: 2017-10-31 | End: 2017-11-01 | Stop reason: HOSPADM

## 2017-10-31 RX ORDER — CARVEDILOL 12.5 MG/1
12.5 TABLET ORAL 2 TIMES DAILY WITH MEALS
Status: DISCONTINUED | OUTPATIENT
Start: 2017-10-31 | End: 2017-11-01 | Stop reason: HOSPADM

## 2017-10-31 RX ORDER — ASPIRIN 300 MG/1
300 SUPPOSITORY RECTAL DAILY
Status: DISCONTINUED | OUTPATIENT
Start: 2017-11-01 | End: 2017-11-01 | Stop reason: HOSPADM

## 2017-10-31 RX ORDER — SODIUM CHLORIDE 0.9 % (FLUSH) 0.9 %
10 SYRINGE (ML) INJECTION AS NEEDED
Status: DISCONTINUED | OUTPATIENT
Start: 2017-10-31 | End: 2017-10-31 | Stop reason: HOSPADM

## 2017-10-31 RX ORDER — ASPIRIN 300 MG/1
300 SUPPOSITORY RECTAL ONCE
Status: COMPLETED | OUTPATIENT
Start: 2017-10-31 | End: 2017-10-31

## 2017-10-31 RX ORDER — CITALOPRAM 20 MG/1
20 TABLET ORAL DAILY
Status: DISCONTINUED | OUTPATIENT
Start: 2017-10-31 | End: 2017-11-01 | Stop reason: HOSPADM

## 2017-10-31 RX ORDER — FAMOTIDINE 40 MG/1
40 TABLET, FILM COATED ORAL 2 TIMES DAILY
COMMUNITY
End: 2018-05-07

## 2017-10-31 RX ORDER — SODIUM CHLORIDE 0.9 % (FLUSH) 0.9 %
1-10 SYRINGE (ML) INJECTION AS NEEDED
Status: DISCONTINUED | OUTPATIENT
Start: 2017-10-31 | End: 2017-11-01 | Stop reason: HOSPADM

## 2017-10-31 RX ORDER — AMITRIPTYLINE HYDROCHLORIDE 25 MG/1
25 TABLET, FILM COATED ORAL NIGHTLY
Status: DISCONTINUED | OUTPATIENT
Start: 2017-10-31 | End: 2017-11-01 | Stop reason: HOSPADM

## 2017-10-31 RX ORDER — ACETAMINOPHEN 325 MG/1
650 TABLET ORAL EVERY 4 HOURS PRN
Status: DISCONTINUED | OUTPATIENT
Start: 2017-10-31 | End: 2017-11-01 | Stop reason: HOSPADM

## 2017-10-31 RX ORDER — NIFEDIPINE 30 MG/1
30 TABLET, EXTENDED RELEASE ORAL DAILY
Status: DISCONTINUED | OUTPATIENT
Start: 2017-10-31 | End: 2017-11-01 | Stop reason: HOSPADM

## 2017-10-31 RX ORDER — LISINOPRIL 20 MG/1
40 TABLET ORAL DAILY
Status: DISCONTINUED | OUTPATIENT
Start: 2017-10-31 | End: 2017-11-01 | Stop reason: HOSPADM

## 2017-10-31 RX ORDER — NICOTINE POLACRILEX 4 MG
15 LOZENGE BUCCAL
Status: DISCONTINUED | OUTPATIENT
Start: 2017-10-31 | End: 2017-11-01 | Stop reason: HOSPADM

## 2017-10-31 RX ORDER — DEXTROSE MONOHYDRATE 25 G/50ML
25 INJECTION, SOLUTION INTRAVENOUS
Status: DISCONTINUED | OUTPATIENT
Start: 2017-10-31 | End: 2017-11-01 | Stop reason: HOSPADM

## 2017-10-31 RX ORDER — CETIRIZINE HYDROCHLORIDE 10 MG/1
10 TABLET ORAL DAILY
Status: DISCONTINUED | OUTPATIENT
Start: 2017-10-31 | End: 2017-11-01 | Stop reason: HOSPADM

## 2017-10-31 RX ORDER — ATORVASTATIN CALCIUM 40 MG/1
80 TABLET, FILM COATED ORAL NIGHTLY
Status: DISCONTINUED | OUTPATIENT
Start: 2017-10-31 | End: 2017-11-01 | Stop reason: HOSPADM

## 2017-10-31 RX ORDER — HYDROCHLOROTHIAZIDE 25 MG/1
25 TABLET ORAL DAILY
Status: DISCONTINUED | OUTPATIENT
Start: 2017-10-31 | End: 2017-11-01 | Stop reason: HOSPADM

## 2017-10-31 RX ORDER — ASPIRIN 325 MG
325 TABLET ORAL DAILY
Status: DISCONTINUED | OUTPATIENT
Start: 2017-11-01 | End: 2017-11-01 | Stop reason: HOSPADM

## 2017-10-31 RX ORDER — BUDESONIDE AND FORMOTEROL FUMARATE DIHYDRATE 160; 4.5 UG/1; UG/1
2 AEROSOL RESPIRATORY (INHALATION)
Status: DISCONTINUED | OUTPATIENT
Start: 2017-10-31 | End: 2017-11-01 | Stop reason: HOSPADM

## 2017-10-31 RX ORDER — FAMOTIDINE 20 MG/1
40 TABLET, FILM COATED ORAL DAILY
Status: DISCONTINUED | OUTPATIENT
Start: 2017-10-31 | End: 2017-11-01 | Stop reason: HOSPADM

## 2017-10-31 RX ORDER — ATORVASTATIN CALCIUM 10 MG/1
10 TABLET, FILM COATED ORAL DAILY
Status: DISCONTINUED | OUTPATIENT
Start: 2017-10-31 | End: 2017-10-31

## 2017-10-31 RX ADMIN — BUDESONIDE AND FORMOTEROL FUMARATE DIHYDRATE 2 PUFF: 160; 4.5 AEROSOL RESPIRATORY (INHALATION) at 21:37

## 2017-10-31 RX ADMIN — ASPIRIN 300 MG: 300 SUPPOSITORY RECTAL at 06:56

## 2017-11-01 ENCOUNTER — APPOINTMENT (OUTPATIENT)
Dept: CARDIOLOGY | Facility: HOSPITAL | Age: 42
End: 2017-11-01

## 2017-11-01 VITALS
HEIGHT: 76 IN | SYSTOLIC BLOOD PRESSURE: 138 MMHG | DIASTOLIC BLOOD PRESSURE: 94 MMHG | BODY MASS INDEX: 38.36 KG/M2 | RESPIRATION RATE: 18 BRPM | TEMPERATURE: 98.4 F | WEIGHT: 315 LBS | OXYGEN SATURATION: 95 % | HEART RATE: 77 BPM

## 2017-11-01 LAB
ANION GAP SERPL CALCULATED.3IONS-SCNC: 8 MMOL/L (ref 3–11)
ARTICHOKE IGE QN: 65 MG/DL (ref 0–130)
BASOPHILS # BLD AUTO: 0.02 10*3/MM3 (ref 0–0.2)
BASOPHILS NFR BLD AUTO: 0.2 % (ref 0–1)
BH CV ECHO MEAS - AO ROOT AREA (BSA CORRECTED): 1.2
BH CV ECHO MEAS - AO ROOT AREA: 9.6 CM^2
BH CV ECHO MEAS - AO ROOT DIAM: 3.5 CM
BH CV ECHO MEAS - BSA(HAYCOCK): 3.1 M^2
BH CV ECHO MEAS - BSA: 2.9 M^2
BH CV ECHO MEAS - BZI_BMI: 46.9 KILOGRAMS/M^2
BH CV ECHO MEAS - BZI_METRIC_HEIGHT: 193 CM
BH CV ECHO MEAS - BZI_METRIC_WEIGHT: 174.6 KG
BH CV ECHO MEAS - CONTRAST EF (2CH): 44 ML/M^2
BH CV ECHO MEAS - CONTRAST EF 4CH: 45 ML/M^2
BH CV ECHO MEAS - EDV(CUBED): 47 ML
BH CV ECHO MEAS - EDV(MOD-SP2): 116 ML
BH CV ECHO MEAS - EDV(MOD-SP4): 140 ML
BH CV ECHO MEAS - EDV(TEICH): 54.8 ML
BH CV ECHO MEAS - EF(CUBED): 53.8 %
BH CV ECHO MEAS - EF(MOD-SP2): 44 %
BH CV ECHO MEAS - EF(TEICH): 46.5 %
BH CV ECHO MEAS - ESV(CUBED): 21.7 ML
BH CV ECHO MEAS - ESV(MOD-SP2): 65 ML
BH CV ECHO MEAS - ESV(MOD-SP4): 77 ML
BH CV ECHO MEAS - ESV(TEICH): 29.3 ML
BH CV ECHO MEAS - FS: 22.7 %
BH CV ECHO MEAS - IVS/LVPW: 1.2
BH CV ECHO MEAS - IVSD: 1.7 CM
BH CV ECHO MEAS - LA DIMENSION: 4 CM
BH CV ECHO MEAS - LA/AO: 1.1
BH CV ECHO MEAS - LAT PEAK E' VEL: 10.3 CM/SEC
BH CV ECHO MEAS - LV DIASTOLIC VOL/BSA (35-75): 47.8 ML/M^2
BH CV ECHO MEAS - LV MASS(C)D: 209.5 GRAMS
BH CV ECHO MEAS - LV MASS(C)DI: 71.6 GRAMS/M^2
BH CV ECHO MEAS - LV MAX PG: 9 MMHG
BH CV ECHO MEAS - LV MEAN PG: 4 MMHG
BH CV ECHO MEAS - LV SYSTOLIC VOL/BSA (12-30): 26.3 ML/M^2
BH CV ECHO MEAS - LV V1 MAX: 150 CM/SEC
BH CV ECHO MEAS - LV V1 MEAN: 88.9 CM/SEC
BH CV ECHO MEAS - LV V1 VTI: 30.8 CM
BH CV ECHO MEAS - LVIDD: 3.6 CM
BH CV ECHO MEAS - LVIDS: 2.8 CM
BH CV ECHO MEAS - LVLD AP2: 9.5 CM
BH CV ECHO MEAS - LVLD AP4: 10.5 CM
BH CV ECHO MEAS - LVLS AP2: 8.4 CM
BH CV ECHO MEAS - LVLS AP4: 8.6 CM
BH CV ECHO MEAS - LVOT AREA (M): 3.1 CM^2
BH CV ECHO MEAS - LVOT AREA: 3.1 CM^2
BH CV ECHO MEAS - LVOT DIAM: 2 CM
BH CV ECHO MEAS - LVPWD: 1.4 CM
BH CV ECHO MEAS - MED PEAK E' VEL: 7.8 CM/SEC
BH CV ECHO MEAS - MV A MAX VEL: 80.9 CM/SEC
BH CV ECHO MEAS - MV E MAX VEL: 82.9 CM/SEC
BH CV ECHO MEAS - MV E/A: 1
BH CV ECHO MEAS - PA ACC SLOPE: 416 CM/SEC^2
BH CV ECHO MEAS - PA ACC TIME: 0.16 SEC
BH CV ECHO MEAS - PA PR(ACCEL): 5.2 MMHG
BH CV ECHO MEAS - RVDD: 3.1 CM
BH CV ECHO MEAS - SI(CUBED): 8.7 ML/M^2
BH CV ECHO MEAS - SI(LVOT): 33.1 ML/M^2
BH CV ECHO MEAS - SI(MOD-SP2): 17.4 ML/M^2
BH CV ECHO MEAS - SI(MOD-SP4): 21.5 ML/M^2
BH CV ECHO MEAS - SI(TEICH): 8.7 ML/M^2
BH CV ECHO MEAS - SV(CUBED): 25.3 ML
BH CV ECHO MEAS - SV(LVOT): 96.8 ML
BH CV ECHO MEAS - SV(MOD-SP2): 51 ML
BH CV ECHO MEAS - SV(MOD-SP4): 63 ML
BH CV ECHO MEAS - SV(TEICH): 25.5 ML
BH CV ECHO MEAS - TAPSE (>1.6): 2.8 CM2
BH CV XLRA - RV BASE: 4.4 CM
BH CV XLRA - RV LENGTH: 7.6 CM
BH CV XLRA - RV MID: 3.5 CM
BH CV XLRA - TDI S': 11.2 CM/SEC
BH CV XLRA MEAS LEFT CCA RATIO VEL: 84.4 CM/SEC
BH CV XLRA MEAS LEFT DIST CCA EDV: 17.6 CM/SEC
BH CV XLRA MEAS LEFT DIST CCA PSV: 84.4 CM/SEC
BH CV XLRA MEAS LEFT DIST ICA EDV: 24.8 CM/SEC
BH CV XLRA MEAS LEFT DIST ICA PSV: 57.9 CM/SEC
BH CV XLRA MEAS LEFT ICA RATIO VEL: 58.5 CM/SEC
BH CV XLRA MEAS LEFT ICA/CCA RATIO: 0.69
BH CV XLRA MEAS LEFT MID ICA EDV: 22.6 CM/SEC
BH CV XLRA MEAS LEFT MID ICA PSV: 50.7 CM/SEC
BH CV XLRA MEAS LEFT PROX CCA EDV: 18.1 CM/SEC
BH CV XLRA MEAS LEFT PROX CCA PSV: 113 CM/SEC
BH CV XLRA MEAS LEFT PROX ECA PSV: 76.1 CM/SEC
BH CV XLRA MEAS LEFT PROX ICA EDV: 20.4 CM/SEC
BH CV XLRA MEAS LEFT PROX ICA PSV: 58.5 CM/SEC
BH CV XLRA MEAS LEFT PROX SCLA PSV: 72.3 CM/SEC
BH CV XLRA MEAS LEFT VERTEBRAL A PSV: 12.6 CM/SEC
BH CV XLRA MEAS RIGHT CCA RATIO VEL: 93.4 CM/SEC
BH CV XLRA MEAS RIGHT DIST CCA EDV: 18.3 CM/SEC
BH CV XLRA MEAS RIGHT DIST CCA PSV: 93.4 CM/SEC
BH CV XLRA MEAS RIGHT DIST ICA EDV: 31.4 CM/SEC
BH CV XLRA MEAS RIGHT DIST ICA PSV: 80 CM/SEC
BH CV XLRA MEAS RIGHT ICA RATIO VEL: 80 CM/SEC
BH CV XLRA MEAS RIGHT ICA/CCA RATIO: 0.86
BH CV XLRA MEAS RIGHT MID ICA EDV: 31.4 CM/SEC
BH CV XLRA MEAS RIGHT MID ICA PSV: 80 CM/SEC
BH CV XLRA MEAS RIGHT PROX CCA EDV: 16.6 CM/SEC
BH CV XLRA MEAS RIGHT PROX CCA PSV: 100 CM/SEC
BH CV XLRA MEAS RIGHT PROX ECA PSV: 88.4 CM/SEC
BH CV XLRA MEAS RIGHT PROX ICA EDV: 20.6 CM/SEC
BH CV XLRA MEAS RIGHT PROX ICA PSV: 58.4 CM/SEC
BH CV XLRA MEAS RIGHT PROX SCLA PSV: 134 CM/SEC
BH CV XLRA MEAS RIGHT VERTEBRAL A EDV: 12.8 CM/SEC
BH CV XLRA MEAS RIGHT VERTEBRAL A PSV: 34.4 CM/SEC
BUN BLD-MCNC: 16 MG/DL (ref 9–23)
BUN/CREAT SERPL: 20 (ref 7–25)
CALCIUM SPEC-SCNC: 9.1 MG/DL (ref 8.7–10.4)
CHLORIDE SERPL-SCNC: 101 MMOL/L (ref 99–109)
CHOLEST SERPL-MCNC: 134 MG/DL (ref 0–200)
CO2 SERPL-SCNC: 29 MMOL/L (ref 20–31)
CREAT BLD-MCNC: 0.8 MG/DL (ref 0.6–1.3)
DEPRECATED RDW RBC AUTO: 44.6 FL (ref 37–54)
E/E' RATIO: 9
EOSINOPHIL # BLD AUTO: 0.23 10*3/MM3 (ref 0–0.3)
EOSINOPHIL NFR BLD AUTO: 2.7 % (ref 0–3)
ERYTHROCYTE [DISTWIDTH] IN BLOOD BY AUTOMATED COUNT: 13.7 % (ref 11.3–14.5)
GFR SERPL CREATININE-BSD FRML MDRD: 105 ML/MIN/1.73
GLUCOSE BLD-MCNC: 174 MG/DL (ref 70–100)
GLUCOSE BLDC GLUCOMTR-MCNC: 137 MG/DL (ref 70–130)
GLUCOSE BLDC GLUCOMTR-MCNC: 171 MG/DL (ref 70–130)
GLUCOSE BLDC GLUCOMTR-MCNC: 211 MG/DL (ref 70–130)
HBA1C MFR BLD: 8.1 % (ref 4.8–5.6)
HCT VFR BLD AUTO: 47.4 % (ref 38.9–50.9)
HDLC SERPL-MCNC: 32 MG/DL (ref 40–60)
HGB BLD-MCNC: 15.8 G/DL (ref 13.1–17.5)
IMM GRANULOCYTES # BLD: 0.04 10*3/MM3 (ref 0–0.03)
IMM GRANULOCYTES NFR BLD: 0.5 % (ref 0–0.6)
LEFT ATRIUM VOLUME INDEX: 15.4 ML/M2
LV EF 2D ECHO EST: 60 %
LYMPHOCYTES # BLD AUTO: 1.77 10*3/MM3 (ref 0.6–4.8)
LYMPHOCYTES NFR BLD AUTO: 20.7 % (ref 24–44)
MAXIMAL PREDICTED HEART RATE: 176 BPM
MCH RBC QN AUTO: 29.8 PG (ref 27–31)
MCHC RBC AUTO-ENTMCNC: 33.3 G/DL (ref 32–36)
MCV RBC AUTO: 89.3 FL (ref 80–99)
MONOCYTES # BLD AUTO: 0.99 10*3/MM3 (ref 0–1)
MONOCYTES NFR BLD AUTO: 11.6 % (ref 0–12)
NEUTROPHILS # BLD AUTO: 5.5 10*3/MM3 (ref 1.5–8.3)
NEUTROPHILS NFR BLD AUTO: 64.3 % (ref 41–71)
PLATELET # BLD AUTO: 168 10*3/MM3 (ref 150–450)
PMV BLD AUTO: 12 FL (ref 6–12)
POTASSIUM BLD-SCNC: 4.1 MMOL/L (ref 3.5–5.5)
RBC # BLD AUTO: 5.31 10*6/MM3 (ref 4.2–5.76)
SODIUM BLD-SCNC: 138 MMOL/L (ref 132–146)
STRESS TARGET HR: 150 BPM
TRIGL SERPL-MCNC: 414 MG/DL (ref 0–150)
WBC NRBC COR # BLD: 8.55 10*3/MM3 (ref 3.5–10.8)

## 2017-11-01 PROCEDURE — 80048 BASIC METABOLIC PNL TOTAL CA: CPT | Performed by: NURSE PRACTITIONER

## 2017-11-01 PROCEDURE — 94760 N-INVAS EAR/PLS OXIMETRY 1: CPT

## 2017-11-01 PROCEDURE — 85025 COMPLETE CBC W/AUTO DIFF WBC: CPT | Performed by: NURSE PRACTITIONER

## 2017-11-01 PROCEDURE — 97165 OT EVAL LOW COMPLEX 30 MIN: CPT

## 2017-11-01 PROCEDURE — 92610 EVALUATE SWALLOWING FUNCTION: CPT

## 2017-11-01 PROCEDURE — 25010000002 SULFUR HEXAFLUORIDE MICROSPH 60.7-25 MG RECONSTITUTED SUSPENSION: Performed by: HOSPITALIST

## 2017-11-01 PROCEDURE — 82962 GLUCOSE BLOOD TEST: CPT

## 2017-11-01 PROCEDURE — 94640 AIRWAY INHALATION TREATMENT: CPT

## 2017-11-01 PROCEDURE — 80061 LIPID PANEL: CPT | Performed by: NURSE PRACTITIONER

## 2017-11-01 PROCEDURE — 93306 TTE W/DOPPLER COMPLETE: CPT

## 2017-11-01 PROCEDURE — 93880 EXTRACRANIAL BILAT STUDY: CPT | Performed by: INTERNAL MEDICINE

## 2017-11-01 PROCEDURE — 93306 TTE W/DOPPLER COMPLETE: CPT | Performed by: INTERNAL MEDICINE

## 2017-11-01 PROCEDURE — 99254 IP/OBS CNSLTJ NEW/EST MOD 60: CPT | Performed by: PSYCHIATRY & NEUROLOGY

## 2017-11-01 PROCEDURE — 97161 PT EVAL LOW COMPLEX 20 MIN: CPT

## 2017-11-01 PROCEDURE — 83036 HEMOGLOBIN GLYCOSYLATED A1C: CPT | Performed by: NURSE PRACTITIONER

## 2017-11-01 PROCEDURE — 93880 EXTRACRANIAL BILAT STUDY: CPT

## 2017-11-01 PROCEDURE — G0108 DIAB MANAGE TRN  PER INDIV: HCPCS

## 2017-11-01 PROCEDURE — 99239 HOSP IP/OBS DSCHRG MGMT >30: CPT | Performed by: HOSPITALIST

## 2017-11-01 RX ORDER — VALACYCLOVIR HYDROCHLORIDE 1 G/1
1000 TABLET, FILM COATED ORAL 3 TIMES DAILY
Qty: 30 TABLET | Refills: 0 | Status: SHIPPED | OUTPATIENT
Start: 2017-11-01 | End: 2017-11-11

## 2017-11-01 RX ORDER — PREDNISONE 10 MG/1
TABLET ORAL
Qty: 39 TABLET | Refills: 0 | Status: SHIPPED | OUTPATIENT
Start: 2017-11-01 | End: 2018-05-07

## 2017-11-01 RX ORDER — PREDNISONE 10 MG/1
TABLET ORAL
Qty: 39 TABLET | Refills: 0 | Status: SHIPPED | OUTPATIENT
Start: 2017-11-01 | End: 2017-11-01

## 2017-11-01 RX ORDER — VALACYCLOVIR HYDROCHLORIDE 1 G/1
1000 TABLET, FILM COATED ORAL 3 TIMES DAILY
Qty: 30 TABLET | Refills: 0 | Status: SHIPPED | OUTPATIENT
Start: 2017-11-01 | End: 2017-11-01

## 2017-11-01 RX ADMIN — LISINOPRIL 40 MG: 20 TABLET ORAL at 11:14

## 2017-11-01 RX ADMIN — CARVEDILOL 12.5 MG: 12.5 TABLET, FILM COATED ORAL at 11:15

## 2017-11-01 RX ADMIN — HYDROCHLOROTHIAZIDE 25 MG: 25 TABLET ORAL at 11:15

## 2017-11-01 RX ADMIN — BUDESONIDE AND FORMOTEROL FUMARATE DIHYDRATE 2 PUFF: 160; 4.5 AEROSOL RESPIRATORY (INHALATION) at 09:41

## 2017-11-01 RX ADMIN — ASPIRIN 325 MG ORAL TABLET 325 MG: 325 PILL ORAL at 11:16

## 2017-11-01 RX ADMIN — SULFUR HEXAFLUORIDE 3 ML: KIT at 09:00

## 2017-11-01 RX ADMIN — NIFEDIPINE 30 MG: 30 TABLET, FILM COATED, EXTENDED RELEASE ORAL at 11:14

## 2017-11-01 RX ADMIN — CARVEDILOL 12.5 MG: 12.5 TABLET, FILM COATED ORAL at 17:14

## 2017-11-01 RX ADMIN — CITALOPRAM 20 MG: 20 TABLET, FILM COATED ORAL at 11:16

## 2017-11-01 RX ADMIN — CETIRIZINE HYDROCHLORIDE 10 MG: 10 TABLET, FILM COATED ORAL at 11:15

## 2017-11-01 RX ADMIN — FAMOTIDINE 40 MG: 20 TABLET, FILM COATED ORAL at 11:16

## 2017-11-01 RX ADMIN — INSULIN LISPRO 4 UNITS: 100 INJECTION, SOLUTION INTRAVENOUS; SUBCUTANEOUS at 12:07

## 2017-11-01 NOTE — THERAPY DISCHARGE NOTE
Acute Care - Physical Therapy Initial Eval/Discharge  [facility]     Patient Name: Raul Welch  : 1975  MRN: [id number]  Today's Date: 2017   Onset of Illness/Injury or Date of Surgery Date: 10/31/17  Date of Referral to PT: 10/31/17  Referring Physician: HUMPHREY SEWELL      Admit Date: 10/31/2017    Visit Dx:    ICD-10-CM ICD-9-CM   1. Oral phase dysphagia R13.11 787.21   2. Impaired mobility and ADLs Z74.09 799.89   3. Impaired functional mobility, balance, gait, and endurance Z74.09 V49.89     Patient Active Problem List   Diagnosis   • Headache   • Restless legs syndrome   • Obstructive sleep apnea syndrome   • Excessive daytime sleepiness   • History of myocardial infarction   • Other chest pain   • Esophageal spasm   • Reactive airway disease with wheezing, moderate persistent, uncomplicated   • Essential hypertension   • Type 2 diabetes mellitus   • Depression   • Facial droop   • Weakness of left leg   • Bell's palsy     Past Medical History:   Diagnosis Date   • Arthritis    • Depression    • Diabetes    • High cholesterol    • Hypertension    • Myocardial infarction      Past Surgical History:   Procedure Laterality Date   • APPENDECTOMY     • CARDIAC CATHETERIZATION     • CARDIAC CATHETERIZATION N/A 2017    Procedure: Coronary angiography;  Surgeon: Zander Bobo MD;  Location: [facility] CATH INVASIVE LOCATION;  Service:           PT ASSESSMENT (last 72 hours)      PT Evaluation       17 1416 17 1410    Rehab Evaluation    Document Type evaluation;discharge summary   COMPLETED CHART REVIEW [id number]. CO-RX WITH O.T.   -CD discharge evaluation/summary  -AN    Subjective Information no complaints;agree to therapy  -CD no complaints;agree to therapy  -AN    Patient Effort, Rehab Treatment good  -CD good  -AN    Symptoms Noted During/After Treatment none  -CD none  -AN    General Information    Patient Profile Review yes  -CD yes  -AN    Onset of Illness/Injury or Date of  Surgery Date 10/31/17  -CD 10/31/17  -AN    Referring Physician HUMPHREY SEWELL  -CD HUMPHREY Sewell  -DAVID    General Observations PT SUPINE ON RA UPON ARRIVAL.   -CD pt supine in bed  -AN    Pertinent History Of Current Problem Pt admitted with 2 days of L LE weakness, then numbness in the face, difficulty swallowing and HA. CT, MRI (-). Dx [place] Palsy's  -CD Pt admitted with 2 days of L LE weakness, then numbness in the face, difficulty swallowing and HA. CT, MRI (-). 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   -CD independent:;transfer;gait;ADL's;home management;cooking;driving;cleaning;work   works full time at [employer]  -AN    Equipment Currently Used at Home none  -CD none  -AN    Plans/Goals Discussed With patient;agreed upon  -CD patient;agreed upon  -AN    Risks Reviewed patient:;LOB;dizziness;change in vital signs  -CD patient:;LOB;dizziness;increased discomfort;change in vital signs  -AN    Benefits Reviewed patient:;increase knowledge  -CD patient:;improve function;increase independence  -AN    Barriers to Rehab none identified  -CD none identified  -AN    Living Environment    Lives With spouse;child(brittni), dependent  -CD child(brittni), dependent;spouse  -AN    Living Arrangements house  -CD house  -AN    Home Accessibility stairs within home  -CD stairs within home  -AN    Number of Stairs Within Home 10  -CD 10  -AN    Stair Railings at Home inside, present at both sides  -CD outside, present at both sides  -AN    Transportation Available  car;family or friend will provide  -AN    Living Environment Comment PT IS OFF WORK FOR VACATION FOR THE WEEK. WANTS TO RETURN HOME.   -CD     Clinical Impression    Date of Referral to PT 10/31/17  -CD     PT Diagnosis BELL'S PALSY.   -CD     Patient/Family Goals Statement WANTS  TO GO HOME TODAY.   -CD     Criteria for Skilled Therapeutic Interventions Met current level of function same as previous level of function;no problems identified which require skilled intervention  -CD     Vital Signs    Pre Systolic BP Rehab 127  -  -AN    Pre Treatment Diastolic BP 79  -CD 79  -AN    Post Systolic BP Rehab 1385  -  -AN    Post Treatment Diastolic BP 81  -CD 81  -AN    Pretreatment Heart Rate (beats/min) 72  -CD 68  -AN    Posttreatment Heart Rate (beats/min) 69  -CD 70  -AN    Pre SpO2 (%) 99  -CD 95  -AN    O2 Delivery Pre Treatment room air  -CD room air  -AN    Post SpO2 (%) 99  -CD     O2 Delivery Post Treatment room air  -CD     Pain Assessment    Pain Assessment No/denies pain  -CD No/denies pain  -AN    Vision Assessment/Intervention    Visual Impairment --   VISION PER O.T. NOTE. ABLE TO READ SIGNS IN GUTIÉRREZ.   -CD WFL;nystagmus   Jose nystagmus, lateral   -AN    Cognitive Assessment/Intervention    Current Cognitive/Communication Assessment functional  -CD functional  -AN    Orientation Status oriented x 4  -CD oriented x 4  -AN    Follows Commands/Answers Questions 100% of the time  -% of the time  -AN    Personal Safety WNL/WFL  -CD WNL/WFL  -AN    Personal Safety Interventions nonskid shoes/slippers when out of bed   PT SAFE TO BE UP AD TRA WITH NSG OK.   -CD fall prevention program maintained  -AN    ROM (Range of Motion)    General ROM no range of motion deficits identified   B LE' S  -CD no range of motion deficits identified  -AN    MMT (Manual Muscle Testing)    General MMT Assessment no strength deficits identified   B LE'S   -CD no strength deficits identified  -AN    Bed Mobility, Assessment/Treatment    Bed Mobility, Assistive Device  bed rails;head of bed elevated  -AN    Bed Mob, Supine to Sit, [place] independent  -CD conditional independence  -AN    Transfer Assessment/Treatment    Transfers, Sit-Stand [place] independent  -CD independent  -AN     Transfers, Stand-Sit [place] independent  -CD independent  -AN    Transfers, Sit-Stand-Sit, Assist Device --   GAIT BELT FOR EVAL SAFETY.   -CD     Walk-In Shower Transfer, [place]  --   simulate I  -AN    Gait Assessment/Treatment    Gait, [place] Level independent  -CD     Gait, Assistive Device --   GAIT BELT.   -CD     Gait, Distance (Feet) 350  -CD     Gait, Gait Deviations vera decreased;step length decreased   DECREASED STANCE TIME ON L. PT REPORT IS BASELINE.   -CD     Gait, Comment NO LOB WITH BACKWARDS, STEPPING OVER OBJECTS OR TURNING 360 DEGREES.   -CD     Motor Skills/Interventions    Additional Documentation  Balance Skills Training (Group);Fine Motor Coordination Training (Group);Gross Motor Coordination Training (Group)  -AN    Balance Skills Training    Sitting-Level of Assistance  Independent  -AN    Standing-Level of Assistance  Independent  -AN    Fine Motor Coordination Training    Detail (Fine Motor Coordination Training)  WFL  -AN    Gross Motor Coordination Training    Gross Motor Skill, Impairments Detail  WFL  -AN    Sensory Assessment/Intervention    Light Touch LLE;RLE   WNL' S  -CD LUE;RUE  -AN    LUE Light Touch  WNL  -AN    Positioning and Restraints    Pre-Treatment Position in bed  -CD in bed  -AN    Post Treatment Position chair  -CD chair  -AN    In Chair reclined;call light within reach;encouraged to call for assist;notified nsg;legs elevated  -CD reclined;call light within reach;encouraged to call for assist  -AN      11/01/17 1105 11/01/17 1030    Rehab Evaluation    Document Type  evaluation  -SG    Subjective Information  no complaints;agree to therapy  -SG    Patient Effort, Rehab Treatment  good  -SG    Symptoms Noted During/After Treatment  none  -SG    General Information    Equipment Currently Used at Home bipap/ cpap   Bipap is from [employer].  -     Living Environment    Lives With spouse;child(brittni), dependent  -     Living  Arrangements apartment  -     Home Accessibility no concerns  -     Transportation Available car;family or friend will provide  -     Pain Assessment    Pain Assessment  No/denies pain  -SG      10/31/17 1341 10/31/17 1338    General Information    Equipment Currently Used at Home  glucometer  -MS    Living Environment    Lives With spouse;child(brittni), dependent  -MS     Living Arrangements house  -MS     Home Accessibility no concerns  -MS     Stair Railings at Home none  -MS     Type of Financial/Environmental Concern unable to afford medication(s);not enough insurance   unable to afford strips for glucometer  -MS     Transportation Available car  -MS       User Key  (r) = Recorded By, (t) = Taken By, (c) = Cosigned By    Initials Name Provider Type    CD Kristal Salinas, PT Physical Therapist    SG Ethel Gonzales, MS CCC-SLP Speech and Language Pathologist    DAVID Fish, OT Occupational Therapist    AMBREEN Delarosa     MS Antonio HAM Griggs, RN Registered Nurse          Physical Therapy Education     Title: PT OT SLP Therapies (Done)     Topic: Physical Therapy (Done)     Point: Precautions (Done)    Learning Progress Summary    Learner Readiness Method Response Comment Documented by Status   Patient Acceptance E VU BENEFITS OF OOB ACTIVITY, D/C PLANNING.  11/01/17 1451 Done                      User Key     Initials Effective Dates Name Provider Type Discipline     06/19/15 -  Kristal Salinas, PT Physical Therapist PT                PT Recommendation and Plan  Anticipated Discharge Disposition: home  PT Frequency: evaluation only  Plan of Care Review  Plan Of Care Reviewed With: patient  Outcome Summary/Follow up Plan: GOALS NOT ESTABLISHED AS PT IS AT BASELINE WITH FUNCTIONAL MOBILITY. NO SKILLED P.T. NEEDED AT THIS TIME. WILL DEFER TO SLP FOR FACIAL DROOP/SWALLOW DIFFICULTY.  RECOMMEND HOME WITH FAMILY.               Outcome Measures       11/01/17 1416 11/01/17 1410        How much help from another person do you currently need...    Turning from your back to your side while in flat bed without using bedrails? 4  -CD      Moving from lying on back to sitting on the side of a flat bed without bedrails? 4  -CD      Moving to and from a bed to a chair (including a wheelchair)? 4  -CD      Standing up from a chair using your arms (e.g., wheelchair, bedside chair)? 4  -CD      Climbing 3-5 steps with a railing? 4  -CD      To walk in hospital room? 4  -CD      AM-PAC 6 Clicks Score 24  -CD      How much help from another is currently needed...    Putting on and taking off regular lower body clothing?  4  -AN     Bathing (including washing, rinsing, and drying)  4  -AN     Toileting (which includes using toilet bed pan or urinal)  4  -AN     Putting on and taking off regular upper body clothing  4  -AN     Taking care of personal grooming (such as brushing teeth)  4  -AN     Eating meals  4  -AN     Score  24  -AN     Modified [place] Scale    Modified [place] Scale 1 - No significant disability despite symptoms.  Able to carry out all usual duties and activities.   BELL'S PALSY.   -CD 1 - No significant disability despite symptoms.  Able to carry out all usual duties and activities.  -AN     Functional Assessment    Outcome Measure Options AM-PAC 6 Clicks Basic Mobility (PT);Modified [place]  -CD AM-PAC 6 Clicks Daily Activity (OT);Modified [place]  -AN       User Key  (r) = Recorded By, (t) = Taken By, (c) = Cosigned By    Initials Name Provider Type    DIMITRI Salinas, PT Physical Therapist    DAVID Fish, OT Occupational Therapist           Time Calculation:         PT Charges       11/01/17 9775          Time Calculation    Start Time 1416  -CD      PT Received On 11/01/17  -CD        User Key  (r) = Recorded By, (t) = Taken By, (c) = Cosigned By    Initials Name Provider Type    DIMITRI Salinas, LU Physical Therapist          Therapy Charges for Today     Code Description  Service Date Service Provider Modifiers Qty    96857242702 HC PT EVAL LOW COMPLEXITY 4 11/1/2017 Kristal Salinas, PT GP 1          PT G-Codes  Outcome Measure Options: AM-PAC 6 Clicks Basic Mobility (PT), Modified [place]    PT Discharge Summary  Anticipated Discharge Disposition: home  Reason for Discharge: At baseline function  Discharge Destination: Home    Kristal Salinas, PT  11/1/2017

## 2017-11-01 NOTE — DISCHARGE SUMMARY
[facility] Medicine Services  DISCHARGE SUMMARY    Patient Name: Raul Welch  : 1975  MRN: [id number]    Date of Admission: 10/31/2017  Date of Discharge:    Length of Stay: 1  Primary Care Physician: HUMPHREY Rojas    Consults     Date and Time Order Name Status Description    10/31/2017 1557 Inpatient Consult to Neurology Completed         Hospital Course     Presenting Problem:   Facial droop [R29.810]    Active Hospital Problems (** Indicates Principal Problem)    Diagnosis Date Noted   • **Bell's palsy [G51.0] 10/31/2017   • Type 2 diabetes mellitus [E11.9] 10/31/2017   • Depression [F32.9] 10/31/2017   • Facial droop [R29.810] 10/31/2017   • Weakness of left leg [R29.898] 10/31/2017   • Essential hypertension [I10] 10/02/2017   • Headache [R51] 2016      Resolved Hospital Problems    Diagnosis Date Noted Date Resolved   No resolved problems to display.          Hospital Course:  Raul Welch is a 42 y.o. male with hx of CAD, depression, DM2, and HTN who presents from [facility] ER with complaints of right facial droop and numbness. He also complains of some left leg weakness. CT head at OSH showed no acute findings. Their MRI scanner was unable to accommodate patient's weight and so he was transferred to [facility] for further evaluation. MRI brain here was unremarkable. Neurology saw the patient and felt this was consistent with Bell's palsy. Recommended 10 days of Valacyclovir and 10 day course of tapering prednisone, starting with 60 mg daily. Patient is back to baseline other than continued right facial weakness. Evaluated by PT/OT who recommended home.            Day of Discharge     HPI:   Seen this morning, ambulating back to bed from bathroom. No issues overnight. Facial weakness persists.     Review of Systems  Gen-no fevers, no chills  CV-no chest pain, no palpitations  Resp-no cough, no dyspnea  GI-no N/V/D, no abd pain      Otherwise  complete ROS is negative except as mentioned in the HPI.    Vital Signs:   Temp:  [97.8 °F (36.6 °C)-98.4 °F (36.9 °C)] 98.4 °F (36.9 °C)  Heart Rate:  [56-69] 66  Resp:  [16-20] 20  BP: (112-153)/(73-93) 153/88     Physical Exam:  Gen-no acute distress  CV-RRR, S1 S2 normal, no m/r/g  Resp-CTAB, no wheezes  Abd-soft, NT, ND, +BS  Ext-no edema  Neuro-A&Ox3, intact strength upper and lower extremities; right facial weakness and palsy  Psych-appropriate mood      Pertinent  and/or Most Recent Results         Results from last 7 days  Lab Units 11/01/17  0446 10/31/17  0611   WBC 10*3/mm3 8.55 7.21   HEMOGLOBIN g/dL 15.8 15.5   HEMATOCRIT % 47.4 45.3   PLATELETS 10*3/mm3 168 162   SODIUM mmol/L 138 142   POTASSIUM mmol/L 4.1 4.8   CHLORIDE mmol/L 101 107   CO2 mmol/L 29.0 23.0*   BUN mg/dL 16 22*   CREATININE mg/dL 0.80 0.90   GLUCOSE mg/dL 174* 215*   CALCIUM mg/dL 9.1 9.5       Results from last 7 days  Lab Units 10/31/17  0611   BILIRUBIN mg/dL 0.6   ALK PHOS U/L 73   ALT (SGPT) U/L 39   AST (SGOT) U/L 39     Lab Results   Component Value Date    HGBA1C 8.10 (H) 11/01/2017       Results from last 7 days  Lab Units 11/01/17  0446   CHOLESTEROL mg/dL 134   TRIGLYCERIDES mg/dL 414*   HDL CHOL mg/dL 32*   LDL CHOL mg/dL 65       Results from last 7 days  Lab Units 11/01/17  0446   HEMOGLOBIN A1C % 8.10*     Brief Urine Lab Results     None          Microbiology Results Abnormal     None          Imaging Results (all)     Procedure Component Value Units Date/Time    MRI Brain Without Contrast [[id number]] Collected:  11/01/17 0818     Updated:  11/01/17 0936    Narrative:       EXAMINATION: MRI BRAIN WO CONTRAST-     INDICATION: Stroke.     TECHNIQUE: MR datasets of the brain were performed without intravenous  contrast.     COMPARISON: No comparison images available.     FINDINGS:   1. There is no evidence of acute restricted diffusion. Acute ischemic  insult or infarct in evolution is not identified.     ADC mapping  exam is also negative for acute low signal insult.     2. Cervicomedullary junction and foramen magnum are clear. Midline  structures, pituitary gland and optic chiasm are normal.     3. The flow-voids are within normal limits. The conal contents are  normal. There is mastoid fluid on the right. Ventricular system is  normal. Central or peripheral atrophy is not identified.     4. FLAIR datasets are negative for pathologic increased white matter  signal. Vasculitis or demyelinating plaque disease is not identified. T1  datasets are negative for hemorrhage or extracerebral collection.       Impression:       Negative MR datasets of the brain. There is no acute insult,  no restricted diffusion, mass, hemorrhage or edema. Advanced white  matter microangiopathy or vasculitis is not currently identified. There  is trace mastoid fluid.      D:  10/31/2017  E:  11/01/2017     This report was finalized on 11/1/2017 9:34 AM by Dr. Walter Matos MD.             Results for orders placed during the hospital encounter of 10/31/17   Adult Transthoracic Echo Complete W/ Cont if Necessary Per Protocol (With Agitated Saline)    Narrative · The study is technically difficult for diagnosis. The quality of the   study is limited due to poor acoustic windows related to patient body   habitus.  · Left ventricular systolic function is normal. Estimated EF = 60%.  · Left ventricular wall thickness is consistent with concentric   hypertrophy.  · There was no significant valvular abnormality.  · Saline test results are negative for a PFO.            Discharge Details      Raul Welch   Home Medication Instructions MILEY:[id number]    Printed on:11/01/17 1507   Medication Information                      amitriptyline (ELAVIL) 25 MG tablet  Take  by mouth.             budesonide-formoterol (SYMBICORT) 160-4.5 MCG/ACT inhaler  Inhale 2 puffsby mouth 2 (Two) Times a Day.             carvedilol (COREG) 12.5 MG tablet  Take 12.5 mg by  mouth 2 (Two) Times a Day.             cetirizine (ZyrTEC) 10 MG tablet  Take 10 mg by mouth Daily.             citalopram (CeleXA) 20 MG tablet  Take 20 mg by mouth Daily.             famotidine (PEPCID) 40 MG tablet  Take 40 mg by mouth Daily.             gabapentin (NEURONTIN) 600 MG tablet  Take 600 mg by mouth 2 (Two) Times a Day. tid             glyBURIDE (DIABETA) 5 MG tablet  Take 5 mg by mouth Daily With Breakfast.             hydrochlorothiazide (HYDRODIURIL) 25 MG tablet  Take 25 mg by mouth Daily.             lisinopril (PRINIVIL,ZESTRIL) 40 MG tablet  Take 40 mg by mouth Daily.             metFORMIN (GLUCOPHAGE) 1000 MG tablet  Take 1,000 mg by mouth 2 (Two) Times a Day With Meals.             Multiple Vitamins-Minerals (MULTIVITAMIN GUMMIES MENS) chewable tablet  Chew.             NIFEdipine XL (PROCARDIA XL) 30 MG 24 hr tablet  Take 1 tablet by mouth Daily.             Omega 3 1200 MG capsule  Take 1,000 mg by mouth 2 (Two) Times a Day.             predniSONE (DELTASONE) 10 MG tablet  Take 6 tabs po daily x 2 days, 5 tabs x 2 days, 4 tabs x 2 days, 3 tabs x 2 days, 2 tabs x 1 day, 1 tab x 1 day then STOP             simvastatin (ZOCOR) 20 MG tablet  Take 20 mg by mouth Every Night.             valACYclovir (VALTREX) 1000 MG tablet  Take 1 tablet by mouth 3 (Three) Times a Day for 10 days.                   Discharge Disposition:  Home or Self Care    Discharge Diet:  Diet Instructions     Advance Diet As Tolerated                     Discharge Activity:  Activity Instructions     Activity as Tolerated                       No future appointments.    Additional Instructions for the Follow-ups that You Need to Schedule     Discharge Follow-up with PCP    As directed    Follow Up Details:  1 week                 Time Spent on Discharge:  35 mins    Alisia Berrios MD  11/01/17  3:01 PM

## 2017-11-01 NOTE — PLAN OF CARE
Problem: Patient Care Overview (Adult)  Goal: Plan of Care Review  Outcome: Ongoing (interventions implemented as appropriate)  Goal: Adult Individualization and Mutuality  Outcome: Ongoing (interventions implemented as appropriate)  Goal: Discharge Needs Assessment  Outcome: Ongoing (interventions implemented as appropriate)    Problem: Stroke (Ischemic) (Adult)  Goal: Signs and Symptoms of Listed Potential Problems Will be Absent or Manageable (Stroke)  Outcome: Ongoing (interventions implemented as appropriate)

## 2017-11-01 NOTE — THERAPY EVALUATION
Acute Care - Speech Language Pathology   Swallow Initial Evaluation [facility]   Clinical Swallow Evaluation       Patient Name: Raul Welch  : 1975  MRN: [id number]  Today's Date: 2017               Admit Date: 10/31/2017    Visit Dx:     ICD-10-CM ICD-9-CM   1. Oral phase dysphagia R13.11 787.21     Patient Active Problem List   Diagnosis   • Headache   • Restless legs syndrome   • Obstructive sleep apnea syndrome   • Excessive daytime sleepiness   • History of myocardial infarction   • Other chest pain   • Esophageal spasm   • Reactive airway disease with wheezing, moderate persistent, uncomplicated   • Essential hypertension   • Type 2 diabetes mellitus   • Depression   • Facial droop   • Weakness of left leg   • Facial palsy     Past Medical History:   Diagnosis Date   • Arthritis    • Depression    • Diabetes    • High cholesterol    • Hypertension    • Myocardial infarction      Past Surgical History:   Procedure Laterality Date   • APPENDECTOMY     • CARDIAC CATHETERIZATION     • CARDIAC CATHETERIZATION N/A 2017    Procedure: Coronary angiography;  Surgeon: Zander Bobo MD;  Location: [facility] CATH INVASIVE LOCATION;  Service:           SWALLOW EVALUATION (last 72 hours)      Swallow Evaluation       17 1030                Rehab Evaluation    Document Type evaluation  -SG        Subjective Information no complaints;agree to therapy  -SG        Patient Effort, Rehab Treatment good  -SG        Symptoms Noted During/After Treatment none  -SG        General Information    Patient Profile Review yes  -SG        Onset of Illness/Injury 10/31/17  -SG        Subjective Patient Observations pt alert and cooperative for evaluation  -SG        Pertinent History Of Current Problem adm w/ headache, R facial weakness, placed on CVA pathway.   -SG        Current Diet Limitations NPO  -SG        Precautions/Limitations, Vision other (see comments);WFL with corrective lenses   R eye redness  noted  -SG        Precautions/Limitations, Hearing WFL  -SG        Prior Level of Function- Communication functional in all spheres  -SG        Prior Level of Function- Swallowing no diet consistency restrictions  -SG        Plans/Goals Discussed With patient;agreed upon  -SG        Barriers to Rehab none identified  -SG        Clinical Impression    Patient's Goals For Discharge return home  -SG        SLP Swallowing Diagnosis oral dysfunction  -SG        Rehab Potential/Prognosis, Swallowing good, to achieve stated therapy goals  -SG        Criteria for Skilled Therapeutic Interventions Met skilled criteria for dysphagia intervention met  -SG        FCM, Swallowing 6-->Level 6  -SG        Predicted Duration Therapy Interv (days) until discharge  -SG        SLP Diet Recommendation soft textures;chopped;thin liquids  -SG        Recommended Diagnostics other (see comments)   ensure diet tolerance w/ SLP f/u  -SG        Recommended Feeding/Eating Techniques other (see comments)   place bolus on R side of mouth  -SG        SLP Rec. for Method of Medication Administration meds whole with thin liquid  -SG        Monitor For Signs Of Aspiration cough  -SG        Anticipated Discharge Disposition home  -SG        Pain Assessment    Pain Assessment No/denies pain  -SG        Oral Motor Structure and Function    Oral Motor Anatomy and Physiology patient demonstrates anatomy that is WNL  -SG        Dentition Assessment present and adequate  -SG        Secretion Management WNL/WFL  -SG        Mucosal Quality moist, healthy  -SG        Gag Response WFL (within functional limits)  -SG        Volitional Swallow no difficulties initiating volitional swallow  -SG        Volitional Cough no difficulties initiating volitional cough  -SG        Oral Musculature General Assessment oral labial impairment   on R  -SG        Clinical Swallow Exam    Mode of Presentation fed by clinician;cup;spoon;straw  -SG        Oral Preparation  Concerns increased prep time  -SG        Oral Transit Concerns increased oral transit time  -SG        Oral Residue sulci residue;other (see comments)   on R  -SG        Oral Phase Results impaired oral phase, signs of dysfunction present  -SG        Pharyngeal Phase Results no signs/symptoms of pharyngeal impairment  -SG        Summary of Clinical Exam Clinical Dys Eval completed. Pt alert and cooperative for evaluation. Pt presented w/ R oral-labial droop, dec'd ROM lingually. Sensation intact on R. Pt presented w/ dec'd oral control, discoordinated swallow intiation, and TC w/ thins via tsp when bolus presented on R. No s/s even when pushed w/ multiple sips of thins via cup and straw when pt self-fed and independently placed bolus on L side of mouth. No s/s noted. No pocketing on R during eval, but given weakness, pt should check R side of mouth for any residue during meals. Edu provided to pt. Pt also reports globus sensation w/ h/o reflux. Reflux precautions discussed. SLP to defer to MD for further management of esophageal issues. Rec: Mech soft, chopped, thins, straws ok, pt to place bolus on L side, check R side for residue w/ meals. SLP to address oral weakness in dys tx.    -SG        Swallow Recommendations    Oral Care oral care with toothbrush and dentifrice BID and PRN  -SG        Modified Eating Strategies upright positioning 90 degrees  -SG        Other Recommendations mechanical soft;thin  -SG        Improve oral skills    To Improve Oral Skills, patient will: Increase tongue tip elevation;Increase tongue lateralization;Increase tongue A-P movement;Increase lip closure;90%;with consistent cues  -SG        Status: Improve Oral Skills New  -SG        Oral Skills Progress continue to adress  -SG        Dysphagia Other 1    Dysphagia Other 1 Objective Patient will participate in trials of regular solids and thin liquids without s/s of aspiration   -SG        Status: Dysphagia Other 1 New  -SG         Dysphagia Other 1 Progress continue to address  -SG          User Key  (r) = Recorded By, (t) = Taken By, (c) = Cosigned By    Initials Name Effective Dates    SG Ethel Muñoz Hudson-Alicja, MS [facility]-SLP 06/22/15 -         EDUCATION  The patient has been educated in the following areas:   Dysphagia (Swallowing Impairment) Oral Care/Hydration Modified Diet Instruction.    SLP Recommendation and Plan  SLP Swallowing Diagnosis: oral dysfunction  SLP Diet Recommendation: soft textures, chopped, thin liquids  Recommended Feeding/Eating Techniques: other (see comments) (place bolus on R side of mouth)  SLP Rec. for Method of Medication Administration: meds whole with thin liquid  Monitor For Signs Of Aspiration: cough  Recommended Diagnostics: other (see comments) (ensure diet tolerance w/ SLP f/u)  Criteria for Skilled Therapeutic Interventions Met: skilled criteria for dysphagia intervention met  Anticipated Discharge Disposition: home  Rehab Potential/Prognosis, Swallowing: good, to achieve stated therapy goals                Plan of Care Review  Plan Of Care Reviewed With: patient  Progress:  (Evaluation)  Outcome Summary/Follow up Plan: Clinical Dys Eval completed. Pt alert and cooperative for evaluation. Pt presented w/ R oral-labial droop, dec'd ROM lingually. Sensation intact on R. Pt presented w/ dec'd oral control, discoordinated swallow intiation, and TC w/ thins via tsp when bolus presented on R. No s/s even when pushed w/ multiple sips of thins via cup and straw when pt self-fed and independently placed bolus on L side of mouth. No s/s noted. No pocketing on R during eval, but given weakness, pt should check R side of mouth for any residue during meals. Edu provided to pt. Pt also reports globus sensation w/ h/o reflux. Reflux precautions discussed. SLP to defer to MD for further management of esophageal issues. Rec: Mech soft, chopped, thins, straws ok, pt to place bolus on L side, check R side for residue w/  meals. SLP to address oral weakness in dys tx.           IP SLP Goals       11/01/17 1247          Safely Consume Diet    Safely Consume Diet- SLP, Date Established 11/01/17  -SG      Safely Consume Diet- SLP, Time to Achieve by discharge  -SG      Safely Consume Diet- SLP, Additional Goal Patient will participate in trials of regular and thins without s/s of aspiration  -SG      Safely Consume Diet- SLP, Date Goal Reviewed 11/01/17  -SG      Safely Consume Diet- SLP, Outcome goal ongoing  -SG        User Key  (r) = Recorded By, (t) = Taken By, (c) = Cosigned By    Initials Name Provider Type    RUSS Gonzales MS CCC-SLP Speech and Language Pathologist               Time Calculation:         Time Calculation- SLP       11/01/17 1248          Time Calculation- SLP    SLP Start Time 1030  -SG      SLP Received On 11/01/17  -SG        User Key  (r) = Recorded By, (t) = Taken By, (c) = Cosigned By    Initials Name Provider Type    RUSS Gonzales MS CCC-SLP Speech and Language Pathologist          Therapy Charges for Today     Code Description Service Date Service Provider Modifiers Qty    [id number]  ST EVAL ORAL PHARYNG SWALLOW 4 11/1/2017 MS ARLENE RizzoSLP GN 1               MS EFRAÍN Rizzo  11/1/2017

## 2017-11-01 NOTE — PROGRESS NOTES
Continued Stay Note  [facility]     Patient Name: Raul Welch  MRN: [id number]  Today's Date: 11/1/2017    Admit Date: 10/31/2017          Discharge Plan       11/01/17 1139    Case Management/Social Work Plan    Plan Social work can check on coupons for medications for the patient when he is discharged.    Patient/Family In Agreement With Plan yes      11/01/17 1106    Case Management/Social Work Plan    Plan home    Patient/Family In Agreement With Plan yes    Additional Comments I spoke with Mr. Welch at the bedside. After discharge from the hospital he plans on returning home and returning to work. He denies needs for any medical equipment. He does not have insurance so any new medications/therapy will be paid for out of pocket.  will see him prior to discharge to see if there is any assistance with the cost of any new meds ordered.               Discharge Codes       11/01/17 1116    Discharge Codes    Discharge Codes 01  Discharge to home        Expected Discharge Date and Time     Expected Discharge Date Expected Discharge Time    Nov 2, 2017             ALIE Poe

## 2017-11-01 NOTE — PLAN OF CARE
Problem: Patient Care Overview (Adult)  Goal: Plan of Care Review  Outcome: Outcome(s) achieved Date Met:  11/01/17 11/01/17 1452   Coping/Psychosocial Response Interventions   Plan Of Care Reviewed With patient   Outcome Evaluation   Outcome Summary/Follow up Plan GOALS NOT ESTABLISHED AS PT IS AT BASELINE WITH FUNCTIONAL MOBILITY. NO SKILLED P.T. NEEDED AT THIS TIME. WILL DEFER TO SLP FOR FACIAL DROOP/SWALLOW DIFFICULTY. RECOMMEND HOME WITH FAMILY.          Problem: Stroke (Ischemic) (Adult)  Goal: Signs and Symptoms of Listed Potential Problems Will be Absent or Manageable (Stroke)  Outcome: Ongoing (interventions implemented as appropriate)    11/01/17 1452   Stroke (Ischemic)   Problems Assessed (Stroke (Ischemic)/TIA) cognitive impairment;communication impairment;motor/sensory impairment   Problems Present (Stroke (Ischemic)/TIA) none

## 2017-11-01 NOTE — PROGRESS NOTES
Discharge Planning Assessment  [facility]     Patient Name: Raul Welch  MRN: [id number]  Today's Date: 11/1/2017    Admit Date: 10/31/2017          Discharge Needs Assessment       11/01/17 1105    Living Environment    Lives With spouse;child(brittni), dependent    Living Arrangements apartment    Home Accessibility no concerns    Transportation Available car;family or friend will provide    Living Environment    Provides Primary Care For no one    Able to Return to Prior Living Arrangements yes    Discharge Needs Assessment    Concerns To Be Addressed no discharge needs identified;denies needs/concerns at this time    Readmission Within The Last 30 Days no previous admission in last 30 days    Anticipated Changes Related to Illness none    Equipment Currently Used at Home bipap/ cpap   Bipap is from [employer].    Equipment Needed After Discharge none    Discharge Disposition home or self-care            Discharge Plan       11/01/17 1106    Case Management/Social Work Plan    Plan home    Patient/Family In Agreement With Plan yes    Additional Comments I spoke with Mr. Welch at the bedside. After discharge from the hospital he plans on returning home and returning to work. He denies needs for any medical equipment. He does not have insurance so any new medications/therapy will be paid for out of pocket.  will see him prior to discharge to see if there is any assistance with the cost of any new meds ordered.         Discharge Placement     No information found                Demographic Summary       11/01/17 1103    Referral Information    Admission Type inpatient    Referral Source admission list    Record Reviewed history and physical    Primary Care Physician Information    Name Domi Strickland            Functional Status       11/01/17 1104    Functional Status Prior    Ambulation 0-->independent    Transferring 0-->independent    Toileting 0-->independent    Bathing  0-->independent    Dressing 0-->independent    Eating 0-->independent    Communication 0-->understands/communicates without difficulty    Swallowing 0-->swallows foods/liquids without difficulty    IADL    Medications independent    Meal Preparation independent    Housekeeping independent    Laundry independent    Shopping independent    Oral Care independent    Employment/Financial    Employment/Finance Comments I confirmed that Mr. Welch does not have insurance. [employer] has been in contact with him. He states that his wife took home a packet from financial couseling to complete.            Psychosocial     None            Abuse/Neglect     None            Legal     None            Substance Abuse     None            Patient Forms     None          Morales Delarosa

## 2017-11-01 NOTE — PLAN OF CARE
Problem: Patient Care Overview (Adult)  Goal: Plan of Care Review  Outcome: Ongoing (interventions implemented as appropriate)    11/01/17 1247   Coping/Psychosocial Response Interventions   Plan Of Care Reviewed With patient   Patient Care Overview   Progress (Evaluation)   Outcome Evaluation   Outcome Summary/Follow up Plan Clinical Dys Eval completed. Pt alert and cooperative for evaluation. Pt presented w/ R oral-labial droop, dec'd ROM lingually. Sensation intact on R. Pt presented w/ dec'd oral control, discoordinated swallow intiation, and TC w/ thins via tsp when bolus presented on R. No s/s even when pushed w/ multiple sips of thins via cup and straw when pt self-fed and independently placed bolus on L side of mouth. No s/s noted. No pocketing on R during eval, but given weakness, pt should check R side of mouth for any residue during meals. Edu provided to pt. Pt also reports globus sensation w/ h/o reflux. Reflux precautions discussed. SLP to defer to MD for further management of esophageal issues. Rec: Mech soft, chopped, thins, straws ok, pt to place bolus on L side, check R side for residue w/ meals. SLP to address oral weakness in dys tx.          Problem: Stroke (Ischemic) (Adult)  Goal: Signs and Symptoms of Listed Potential Problems Will be Absent or Manageable (Stroke)  Outcome: Ongoing (interventions implemented as appropriate)    11/01/17 1247   Stroke (Ischemic)   Problems Assessed (Stroke (Ischemic)/TIA) eating/swallowing impairment   Problems Present (Stroke (Ischemic)/TIA) eating/swallowing impairment         Problem: Inpatient SLP  Goal: Dysphagia- Patient will safely consume diet as per recommendation with no signs/symptoms of aspiration  Outcome: Ongoing (interventions implemented as appropriate)    11/01/17 1247   Safely Consume Diet   Safely Consume Diet- SLP, Date Established 11/01/17   Safely Consume Diet- SLP, Time to Achieve by discharge   Safely Consume Diet- SLP, Date Goal  Reviewed 11/01/17   Safely Consume Diet- SLP, Outcome      11/01/17 1247   Safely Consume Diet   Safely Consume Diet- SLP, Date Established 11/01/17   Safely Consume Diet- SLP, Time to Achieve by discharge   Safely Consume Diet- SLP, Additional Goal Patient will participate in trials of regular and thins without s/s of aspiration   Safely Consume Diet- SLP, Date Goal Reviewed 11/01/17   Safely Consume Diet- SLP, Outcome goal ongoing   goal ongoing

## 2017-11-01 NOTE — CONSULTS
Neurology    Referring Provider: HUMPHREY Worthington    Reason for Consultation: facial droop      Chief complaint: right facial droop    Subjective .     History of present illness:  Mr. Welch is a pleasant 42-year-old male with a past medical history significant for diabetes mellitus, hypertension, prior MI who is admitted to the hospitalist service for right facial droop.  He reports the symptoms began Monday evening 10/30/2017 and have persisted since then.  He complains of weakness with the right upper and lower face and mild numbness.  He has had difficulty drinking liquids due to dribbling from the right side of the face.  Per report there has been some left leg weakness for the past week or so but he has been able to ambulate well otherwise.    Review of Systems: Positive for facial weakness.Otherwise complete review of systems was discussed with the patient and found to be negative except for that mentioned in history of present illness or in the initial H&P dated 10/31/2017    History  Past Medical History:   Diagnosis Date   • Arthritis    • Depression    • Diabetes    • High cholesterol    • Hypertension    • Myocardial infarction    ,   Past Surgical History:   Procedure Laterality Date   • APPENDECTOMY     • CARDIAC CATHETERIZATION     • CARDIAC CATHETERIZATION N/A 9/30/2017    Procedure: Coronary angiography;  Surgeon: Zander Bobo MD;  Location: [facility] CATH INVASIVE LOC[facility];  Service:    ,   Family History   Problem Relation Age of Onset   • Diabetes Mother    • Alcohol abuse Father    • Diabetes Father    • Heart disease Father    ,   Social History   Substance Use Topics   • Smoking status: Former Smoker   • Smokeless tobacco: Never Used   • Alcohol use No   ,   Prescriptions Prior to Admission   Medication Sig Dispense Refill Last Dose   • amitriptyline (ELAVIL) 25 MG tablet Take  by mouth.   More than a month at Unknown time   • budesonide-formoterol (SYMBICORT) 160-4.5 MCG/ACT inhaler Inhale 2  "puffsby mouth 2 (Two) Times a Day. 10.2 g 0    • carvedilol (COREG) 12.5 MG tablet Take 12.5 mg by mouth 2 (Two) Times a Day.   9/30/2017 at 0330   • cetirizine (ZyrTEC) 10 MG tablet Take 10 mg by mouth Daily.   9/30/2017 at 0330   • citalopram (CeleXA) 20 MG tablet Take 20 mg by mouth Daily.   9/30/2017 at 0330   • famotidine (PEPCID) 40 MG tablet Take 40 mg by mouth Daily.      • gabapentin (NEURONTIN) 600 MG tablet Take 600 mg by mouth 2 (Two) Times a Day. tid   9/30/2017 at 0330   • glyBURIDE (DIABETA) 5 MG tablet Take 5 mg by mouth Daily With Breakfast.   9/30/2017 at 0330   • hydrochlorothiazide (HYDRODIURIL) 25 MG tablet Take 25 mg by mouth Daily.   9/30/2017 at 0330   • lisinopril (PRINIVIL,ZESTRIL) 40 MG tablet Take 40 mg by mouth Daily.   9/30/2017 at 0330   • metFORMIN (GLUCOPHAGE) 1000 MG tablet Take 1,000 mg by mouth 2 (Two) Times a Day With Meals.   9/30/2017 at 0330   • Multiple Vitamins-Minerals (MULTIVITAMIN GUMMIES MENS) chewable tablet Chew.   More than a month at Unknown time   • NIFEdipine XL (PROCARDIA XL) 30 MG 24 hr tablet Take 1 tablet by mouth Daily. 30 tablet 0    • Omega 3 1200 MG capsule Take 1,000 mg by mouth 2 (Two) Times a Day.   9/30/2017 at 0330   • simvastatin (ZOCOR) 20 MG tablet Take 20 mg by mouth Every Night.   9/30/2017 at 0330    and Allergies:  Review of patient's allergies indicates no known allergies.    Objective     Vital Signs   Blood pressure 153/88, pulse 66, temperature 98.4 °F (36.9 °C), temperature source Oral, resp. rate 20, height 76\" (193 cm), weight (!) 385 lb 9.6 oz (175 kg), SpO2 97 %.    Physical Exam:      Gen: Lying in bed with eyes open. In NAD. Appears stated age   Eyes: PERRL, conjuntivae normal, mild weakness of right eye closure   ENT: External canals normal bilaterally. Dentition normal   Neck: Supple. No thyroid enlargement noted   Respiratory: CTA bilaterally. Respirations unlabored   CV: RRR, S1 and S2 nml. Radial pulses 2+ bilaterally.    Skin: " No rashes noted on exposed skin. Normal tugor.   MSK: Normal bulk and tone. Nml ROM     Neurologic:   Mental status: Awake, alert, oriented x4. Follows commands. Speech mildly slurred.    CN: PERRL, EOM intact, sensation intact in upper/mid/lower face bilaterally, right upper and lower facial weakness, hearing intact to finger rub bilaterally, palate elevates symmetrically, tongue movements and SCMs strong bilaterally    Motor: Full strength in upper extremity is bilaterally.  Full strength in the right lower extremity throughout and mild weakness in the left lower extremity but able to sustain against gravity    Reflexes: !+ throughout.    Sensory: Intact to LT throughout   Coordination: No dysmetria noted   Gait: Not tested        Results Reviewed:     Labs reviewed  MRI brain personally reviewed.  No acute process seen.  Agree with report  Carotid duplex and TTE reports reviewed                 Assessment/Plan     1.  Bell's palsy = patient presents with peripheral pattern of right facial weakness. MRI brain negative for acute infarct. Carotid duplex and TTE unremarkable. Recommend 10-12 day course of valacyclovir and 14-day course of prednisone 60mg, followed by rapid taper.      2.  Hypertension = continue meds    3.  Hyperlipidemia =  LDL 45.  Triglycerides 414.  On atorvastatin here and fish oil at home. Patient may benefit from addition of fenofibrate    4.  Type 2 diabetes mellitus = A1c 8.1%.  Continue meds and glycemic monitoring      Okay from neuro standpoint for discharge when okay with primary team. Follow-up in neurology clinic (any provider), first available appointment      Anuja Landa MD  11/01/17  1:03 PM

## 2017-11-01 NOTE — CONSULTS
"Diabetes Education  Assessment/Teaching    Patient Name:  Raul Welch  YOB: 1975  MRN: [id number]  Admit Date:  10/31/2017      Assessment Date:  11/1/2017       Most Recent Value    General Information      Referral From:  A1c, Blood glucose, MD order    Height  6' 4\" (1.93 m)    Height Method  Stated    Weight  (!)  385 lb 9.6 oz (175 kg)    Weight Method  Bed scale    Pregnancy Assessment     Diabetes History     What type of diabetes do you have?  Type 2    Length of Diabetes Diagnosis  6 - 10 years    Current DM knowledge  poor    Have you had diabetes education/teaching in the past?  no    Do you test your blood sugar at home?  yes    Frequency of checks  1-2 times daily, but has not been checking recently because he cannot afford strips    Meter type  [employer]    Who performs the test?  self    Typical readings  95 - 250    Have you had low blood sugar? (<70mg/dl)  no    Have you had high blood sugar? (>140mg/dl)  yes    How would you rate your diabetes control?  poor    Education Preferences     What areas of diabetes would you like to learn about?  avoiding high blood sugar, avoiding low blood sugar, diabetes complications, diet information, exercise information, medications for diabetes, understanding diabetes, testing my blood sugar at home, stress/coping skills, resources on diabetes    Nutrition Information     What is the biggest challenge you have with your diet?  Poor choices    Assessment Topics     Healthy Eating - Assessment  Needs education    Being Active - Assessment  Needs education    Taking Medication - Assessment  Needs education    Problem Solving - Assessment  Needs education    Reducing Risk - Assessment  Competent    Healthy Coping - Assessment  Needs education    Monitoring - Assessment  Needs education    DM Goals     Healthy Eating - Goal  0-30 days from discharge    Being Active - Goal  0-30 days from discharge    Taking Medication - Goal  0-7 days from " discharge    Problem Solving - Goal  0-7 days from discharge    Healthy Coping - Goal  0-7 days from discharge    Monitoring - Goal  0-7 days from discharge               Most Recent Value    DM Education Needs     Meter  Has own    Meter Type  Other (comment) [[employer]]    Frequency of Testing  2 times a day    Blood Glucose Target Range      Medication  Oral    Problem Solving  Hypoglycemia, Hyperglycemia, Sick days, Symptoms, Treatment, Signs    Reducing Risks  A1C testing, Blood pressure, Eye exam, Immunizations    Healthy Eating  Reviewed meal plan    Physical Activity Frequency  Occasionally    Healthy Coping  Anxious    Motivation  Moderate    Teaching Method  Explanation, Discussion, Handouts, Teach back    Patient Response  Verbalized understanding            Other Comments:    Pt stated that he has not been checking his blood sugar regularly because he has no insurance and cant afford the strips. Pt has a significant family hx of diabetes.  Discussed and taught patient about type 2 diabetes self-management, risk factors, and importance of blood glucose control to reduce complications. Target blood glucose readings and A1c goals per ADA were reviewed. Reviewed with patient current A1c and discussed its significance. Signs, symptoms and treatment of hyperglycemia and hypoglycemia were discussed. Lifestyle changes such as physical activity with MD approval and healthy eating were encouraged. Stressed the importance of strict blood sugar control  to prevent complications.  Pt instructed to  check blood sugar 2 times per day and to call PCP if  Blood glucose is higher than 180 two times or more.  Patient was encouraged to keep record of blood glucose readings to take to follow up appointment with PCP.  Pt was offered a free op follow up appointment however declined at this time.          Electronically signed by:  Marvin Cole RN  11/01/17 12:46 PM

## 2017-11-01 NOTE — PLAN OF CARE
Problem: Patient Care Overview (Adult)  Goal: Plan of Care Review  Outcome: Outcome(s) achieved Date Met:  11/01/17 11/01/17 1447   Coping/Psychosocial Response Interventions   Plan Of Care Reviewed With patient   Outcome Evaluation   Outcome Summary/Follow up Plan Pt does not demonstrate any functional, motor or balance deficits. No further OT warranted at this time.         Problem: Stroke (Ischemic) (Adult)  Goal: Signs and Symptoms of Listed Potential Problems Will be Absent or Manageable (Stroke)  Outcome: Outcome(s) achieved Date Met:  11/01/17 11/01/17 1447   Stroke (Ischemic)   Problems Assessed (Stroke (Ischemic)/TIA) communication impairment;motor/sensory impairment;muscle tone abnormal   Problems Present (Stroke (Ischemic)/TIA) none

## 2017-11-01 NOTE — THERAPY DISCHARGE NOTE
Acute Care - Occupational Therapy Initial Eval/Discharge  [facility]     Patient Name: Raul Welch  : 1975  MRN: [id number]  Today's Date: 2017  Onset of Illness/Injury or Date of Surgery Date: (P) 10/31/17  Date of Referral to OT: 10/31/17  Referring Physician: HUMPHREY SEWELL (P)      Admit Date: 10/31/2017       ICD-10-CM ICD-9-CM   1. Oral phase dysphagia R13.11 787.21   2. Impaired mobility and ADLs Z74.09 799.89   3. Impaired functional mobility, balance, gait, and endurance Z74.09 V49.89     Patient Active Problem List   Diagnosis   • Headache   • Restless legs syndrome   • Obstructive sleep apnea syndrome   • Excessive daytime sleepiness   • History of myocardial infarction   • Other chest pain   • Esophageal spasm   • Reactive airway disease with wheezing, moderate persistent, uncomplicated   • Essential hypertension   • Type 2 diabetes mellitus   • Depression   • Facial droop   • Weakness of left leg   • Facial palsy     Past Medical History:   Diagnosis Date   • Arthritis    • Depression    • Diabetes    • High cholesterol    • Hypertension    • Myocardial infarction      Past Surgical History:   Procedure Laterality Date   • APPENDECTOMY     • CARDIAC CATHETERIZATION     • CARDIAC CATHETERIZATION N/A 2017    Procedure: Coronary angiography;  Surgeon: Zander Bobo MD;  Location: [facility] CATH INVASIVE LOCATION;  Service:           OT ASSESSMENT FLOWSHEET (last 72 hours)      OT Evaluation       17 1448 17 1416 17 1410 17 1105 17 1104    Rehab Evaluation    Document Type  (P)  evaluation;discharge summary   COMPLETED CHART REVIEW [id number]. CO-RX WITH O.T.   -CD discharge evaluation/summary  -AN      Subjective Information  (P)  no complaints;agree to therapy  -CD no complaints;agree to therapy  -AN      Patient Effort, Rehab Treatment  (P)  good  -CD good  -AN      Symptoms Noted During/After Treatment  (P)  none  -CD none  -AN      General Information     Patient Profile Review  (P)  yes  -CD yes  -AN      Onset of Illness/Injury or Date of Surgery Date  (P)  10/31/17  -CD 10/31/17  -AN      Referring Physician  (P)  HUMPHREY SEWELL  -CD HUMPHREY Sewell  -DAVID      General Observations  (P)  PT SUPINE ON RA UPON ARRIVAL.   -CD pt supine in bed  -AN      Pertinent History Of Current Problem  (P)  Pt admitted with 2 days of L LE weakness, then numbness in the face, difficulty swallowing and HA. CT, MRI (-). Dx Gordo Palsy's  -CD Pt admitted with 2 days of L LE weakness, then numbness in the face, difficulty swallowing and HA. CT, MRI (-). 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   -CD independent:;transfer;gait;ADL's;home management;cooking;driving;cleaning;work   works full time at [employer]  -AN      Equipment Currently Used at Home  (P)  none  -CD none  -AN bipap/ cpap   Bipap is from [employer].  -JM     Plans/Goals Discussed With  (P)  patient;agreed upon  -CD patient;agreed upon  -AN      Risks Reviewed  (P)  patient:;LOB;dizziness;change in vital signs  -CD patient:;LOB;dizziness;increased discomfort;change in vital signs  -AN      Benefits Reviewed  (P)  patient:;increase knowledge  -CD patient:;improve function;increase independence  -AN      Barriers to Rehab  (P)  none identified  -CD none identified  -AN      Living Environment    Lives With  (P)  spouse;child(brittni), dependent  -CD child(brittni), dependent;spouse  -AN spouse;child(brittni), dependent  -     Living Arrangements  (P)  house  -CD house  -AN apartment  -     Home Accessibility  (P)  stairs within home  -CD stairs within home  -AN no concerns  -     Number of Stairs Within Home   10  -AN      Stair Railings at Home   outside, present at both sides  -AN      Transportation  Available   car;family or friend will provide  -AN car;family or friend will provide  -     Clinical Impression    Date of Referral to OT   10/31/17  -AN      OT Diagnosis   No functional deficits found  -AN      Impairments Found (describe specific impairments)   --   none  -AN      Patient/Family Goals Statement   Pt agreeable to eval, wants to return home  -AN      Criteria for Skilled Therapeutic Interventions Met   no  -AN      Rehab Potential   other (see comments)  -AN      Therapy Frequency   evaluation only  -AN      Anticipated Discharge Disposition home  -AN  home;other (see comments)   wait for MD clearance to return to work/drive  -AN      Functional Level Prior    Ambulation     0-->independent  -JM    Transferring     0-->independent  -JM    Toileting     0-->independent  -JM    Bathing     0-->independent  -JM    Dressing     0-->independent  -JM    Eating     0-->independent  -JM    Communication     0-->understands/communicates without difficulty  -JM    Swallowing     0-->swallows foods/liquids without difficulty  -JM    Vital Signs    Pre Systolic BP Rehab   127  -AN      Pre Treatment Diastolic BP   79  -AN      Post Systolic BP Rehab   138  -AN      Post Treatment Diastolic BP   81  -AN      Pretreatment Heart Rate (beats/min)   68  -AN      Posttreatment Heart Rate (beats/min)   70  -AN      Pre SpO2 (%)   95  -AN      O2 Delivery Pre Treatment   room air  -AN      Pain Assessment    Pain Assessment   No/denies pain  -AN      Vision Assessment/Intervention    Visual Impairment   WFL;nystagmus   Jose nystagmus, lateral   -AN      Cognitive Assessment/Intervention    Current Cognitive/Communication Assessment   functional  -AN      Orientation Status   oriented x 4  -AN      Follows Commands/Answers Questions   100% of the time  -AN      Personal Safety   WNL/WFL  -AN      Personal Safety Interventions   fall prevention program maintained  -AN      ROM (Range of Motion)    General ROM   no  range of motion deficits identified  -AN      MMT (Manual Muscle Testing)    General MMT Assessment   no strength deficits identified  -AN      Bed Mobility, Assessment/Treatment    Bed Mobility, Assistive Device   bed rails;head of bed elevated  -AN      Bed Mob, Supine to Sit, [place]   conditional independence  -AN      Transfer Assessment/Treatment    Transfers, Sit-Stand [place]   independent  -AN      Transfers, Stand-Sit [place]   independent  -AN      Walk-In Shower Transfer, [place]   --   simulate I  -AN      Functional Mobility    Functional Mobility- Ind. Level   independent  -AN      Functional Mobility-Distance (Feet)   250  -AN      ADL Assessment/Intervention    Additional Documentation   --   simulated or expec harini I with ADL's  -AN      Lower Body Dressing Assessment/Training    LB Dressing Assess/Train, Clothing Type   doffing:;donning:;socks  -AN      LB Dressing Assess/Train, [place]   independent  -AN      Motor Skills/Interventions    Additional Documentation   Balance Skills Training (Group);Fine Motor Coordination Training (Group);Gross Motor Coordination Training (Group)  -AN      Balance Skills Training    Sitting-Level of Assistance   Independent  -AN      Standing-Level of Assistance   Independent  -AN      Gross Motor Coordination Training    Gross Motor Skill, Impairments Detail   WFL  -AN      Fine Motor Coordination Training    Detail (Fine Motor Coordination Training)   WFL  -AN      Sensory Assessment/Intervention    Light Touch   LUE;RUE  -AN      LUE Light Touch   WNL  -AN      Positioning and Restraints    Pre-Treatment Position   in bed  -AN      Post Treatment Position   chair  -AN      In Chair   reclined;call light within reach;encouraged to call for assist  -AN        11/01/17 1030 10/31/17 1341 10/31/17 1338          Rehab Evaluation    Document Type evaluation  -SG        Subjective Information no complaints;agree to therapy  -SG        Patient  Effort, Rehab Treatment good  -SG        Symptoms Noted During/After Treatment none  -SG        General Information    Equipment Currently Used at Home   glucometer  -MS      Living Environment    Lives With  spouse;child(brittni), dependent  -MS       Living Arrangements  house  -MS       Home Accessibility  no concerns  -MS       Stair Railings at Home  none  -MS       Type of Financial/Environmental Concern  unable to afford medication(s);not enough insurance   unable to afford strips for glucometer  -MS       Transportation Available  car  -MS       Functional Level Prior    Ambulation   0-->independent  -MS      Transferring   0-->independent  -MS      Toileting   0-->independent  -MS      Bathing   0-->independent  -MS      Dressing   0-->independent  -MS      Eating   0-->independent  -MS      Communication   0-->understands/communicates without difficulty  -MS      Swallowing   0-->swallows foods/liquids without difficulty  -MS      Pain Assessment    Pain Assessment No/denies pain  -SG          User Key  (r) = Recorded By, (t) = Taken By, (c) = Cosigned By    Initials Name Effective Dates    CD Kristal Salinas, PT 06/19/15 -     SG Ethel Gonzales, MS CCC-SLP 06/22/15 -     DAVID Fish, OT 06/22/15 -     AMBREEN Delarosa 05/02/16 -     MS Antonio Griggs RN 03/13/17 -               OT Recommendation and Plan  Anticipated Discharge Disposition: home  Therapy Frequency: evaluation only  Plan of Care Review  Plan Of Care Reviewed With: patient  Outcome Summary/Follow up Plan: Pt does not demonstrate any functional, motor or balance deficits. No further OT warranted at this time.               Outcome Measures       11/01/17 1410          How much help from another is currently needed...    Putting on and taking off regular lower body clothing? 4  -AN      Bathing (including washing, rinsing, and drying) 4  -AN      Toileting (which includes using toilet bed pan or urinal) 4  -AN       Putting on and taking off regular upper body clothing 4  -AN      Taking care of personal grooming (such as brushing teeth) 4  -AN      Eating meals 4  -AN      Score 24  -AN      Modified Cortney Scale    Modified Cortney Scale 1 - No significant disability despite symptoms.  Able to carry out all usual duties and activities.  -AN      Functional Assessment    Outcome Measure Options AM-PAC 6 Clicks Daily Activity (OT);Modified Dane  -AN        User Key  (r) = Recorded By, (t) = Taken By, (c) = Cosigned By    Initials Name Provider Type    DAVID Fish OT Occupational Therapist          Time Calculation:         Time Calculation- OT       11/01/17 1448          Time Calculation- OT    OT Start Time 1410  -AN      Total Timed Code Minutes- OT 0 minute(s)  -AN      OT Received On 11/01/17  -AN        User Key  (r) = Recorded By, (t) = Taken By, (c) = Cosigned By    Initials Name Provider Type    DAVID Fish OT Occupational Therapist          Therapy Charges for Today     Code Description Service Date Service Provider Modifiers Qty    [id number]  OT EVAL LOW COMPLEXITY 4 11/1/2017 Sue Fish OT GO 1               OT Discharge Summary  Anticipated Discharge Disposition: home  Reason for Discharge: At baseline function  Outcomes Achieved: Refer to plan of care for updates on goals achieved  Discharge Destination: Home    Sue Fish OT  11/1/2017

## 2017-11-02 NOTE — SIGNIFICANT NOTE
Spoke with ZARINA Cho NP for hospitalist, pt okay to return to work with no restrictions. JUANITAoster

## 2018-03-23 ENCOUNTER — OFFICE VISIT (OUTPATIENT)
Dept: NEUROLOGY | Facility: CLINIC | Age: 43
End: 2018-03-23

## 2018-03-23 VITALS
BODY MASS INDEX: 38.36 KG/M2 | WEIGHT: 315 LBS | OXYGEN SATURATION: 97 % | DIASTOLIC BLOOD PRESSURE: 78 MMHG | SYSTOLIC BLOOD PRESSURE: 130 MMHG | HEART RATE: 68 BPM | HEIGHT: 76 IN

## 2018-03-23 DIAGNOSIS — G47.33 OBSTRUCTIVE SLEEP APNEA SYNDROME: Primary | ICD-10-CM

## 2018-03-23 PROCEDURE — 99214 OFFICE O/P EST MOD 30 MIN: CPT | Performed by: NURSE PRACTITIONER

## 2018-03-26 ENCOUNTER — APPOINTMENT (OUTPATIENT)
Dept: CT IMAGING | Facility: HOSPITAL | Age: 43
End: 2018-03-26
Attending: EMERGENCY MEDICINE

## 2018-03-26 ENCOUNTER — HOSPITAL ENCOUNTER (EMERGENCY)
Facility: HOSPITAL | Age: 43
Discharge: HOME | End: 2018-03-26
Attending: EMERGENCY MEDICINE | Admitting: EMERGENCY MEDICINE

## 2018-03-26 VITALS
RESPIRATION RATE: 18 BRPM | BODY MASS INDEX: 38.36 KG/M2 | HEIGHT: 76 IN | TEMPERATURE: 98 F | WEIGHT: 315 LBS | SYSTOLIC BLOOD PRESSURE: 136 MMHG | DIASTOLIC BLOOD PRESSURE: 81 MMHG | HEART RATE: 52 BPM | OXYGEN SATURATION: 96 %

## 2018-03-26 DIAGNOSIS — R10.12 LUQ ABDOMINAL PAIN: Primary | ICD-10-CM

## 2018-03-26 LAB
ALBUMIN SERPL-MCNC: 4.3 G/DL (ref 3.5–5)
ALBUMIN/GLOB SERPL: 1.4 G/DL (ref 1–2)
ALP SERPL-CCNC: 78 U/L (ref 38–126)
ALT SERPL W P-5'-P-CCNC: 46 U/L (ref 13–69)
ANION GAP SERPL CALCULATED.3IONS-SCNC: 17.5 MMOL/L (ref 10–20)
AST SERPL-CCNC: 27 U/L (ref 15–46)
BASOPHILS # BLD AUTO: 0.02 10*3/MM3 (ref 0–0.2)
BASOPHILS NFR BLD AUTO: 0.2 % (ref 0–2.5)
BILIRUB SERPL-MCNC: 0.7 MG/DL (ref 0.2–1.3)
BUN BLD-MCNC: 15 MG/DL (ref 7–20)
BUN/CREAT SERPL: 21.4 (ref 6.3–21.9)
CALCIUM SPEC-SCNC: 9.7 MG/DL (ref 8.4–10.2)
CHLORIDE SERPL-SCNC: 104 MMOL/L (ref 98–107)
CO2 SERPL-SCNC: 29 MMOL/L (ref 26–30)
CREAT BLD-MCNC: 0.7 MG/DL (ref 0.6–1.3)
DEPRECATED RDW RBC AUTO: 42.1 FL (ref 37–54)
EOSINOPHIL # BLD AUTO: 0.26 10*3/MM3 (ref 0–0.7)
EOSINOPHIL NFR BLD AUTO: 3.1 % (ref 0–7)
ERYTHROCYTE [DISTWIDTH] IN BLOOD BY AUTOMATED COUNT: 13.2 % (ref 11.5–14.5)
GFR SERPL CREATININE-BSD FRML MDRD: 123 ML/MIN/1.73
GLOBULIN UR ELPH-MCNC: 3.1 GM/DL
GLUCOSE BLD-MCNC: 174 MG/DL (ref 74–98)
HCT VFR BLD AUTO: 44.5 % (ref 42–52)
HGB BLD-MCNC: 15.1 G/DL (ref 14–18)
IMM GRANULOCYTES # BLD: 0.06 10*3/MM3 (ref 0–0.06)
IMM GRANULOCYTES NFR BLD: 0.7 % (ref 0–0.6)
LIPASE SERPL-CCNC: 73 U/L (ref 23–300)
LYMPHOCYTES # BLD AUTO: 1.8 10*3/MM3 (ref 0.6–3.4)
LYMPHOCYTES NFR BLD AUTO: 21.5 % (ref 10–50)
MCH RBC QN AUTO: 30 PG (ref 27–31)
MCHC RBC AUTO-ENTMCNC: 33.9 G/DL (ref 30–37)
MCV RBC AUTO: 88.3 FL (ref 80–94)
MONOCYTES # BLD AUTO: 0.95 10*3/MM3 (ref 0–0.9)
MONOCYTES NFR BLD AUTO: 11.3 % (ref 0–12)
NEUTROPHILS # BLD AUTO: 5.3 10*3/MM3 (ref 2–6.9)
NEUTROPHILS NFR BLD AUTO: 63.2 % (ref 37–80)
NRBC BLD MANUAL-RTO: 0 /100 WBC (ref 0–0)
PLATELET # BLD AUTO: 164 10*3/MM3 (ref 130–400)
PMV BLD AUTO: 10.9 FL (ref 6–12)
POTASSIUM BLD-SCNC: 4.5 MMOL/L (ref 3.5–5.1)
PROT SERPL-MCNC: 7.4 G/DL (ref 6.3–8.2)
RBC # BLD AUTO: 5.04 10*6/MM3 (ref 4.7–6.1)
SODIUM BLD-SCNC: 146 MMOL/L (ref 137–145)
WBC NRBC COR # BLD: 8.39 10*3/MM3 (ref 4.8–10.8)

## 2018-03-26 PROCEDURE — 83690 ASSAY OF LIPASE: CPT | Performed by: EMERGENCY MEDICINE

## 2018-03-26 PROCEDURE — 25010000002 IOPAMIDOL 61 % SOLUTION: Performed by: EMERGENCY MEDICINE

## 2018-03-26 PROCEDURE — 80053 COMPREHEN METABOLIC PANEL: CPT | Performed by: EMERGENCY MEDICINE

## 2018-03-26 PROCEDURE — 99283 EMERGENCY DEPT VISIT LOW MDM: CPT

## 2018-03-26 PROCEDURE — 74177 CT ABD & PELVIS W/CONTRAST: CPT

## 2018-03-26 PROCEDURE — 85025 COMPLETE CBC W/AUTO DIFF WBC: CPT | Performed by: EMERGENCY MEDICINE

## 2018-03-26 RX ORDER — SODIUM CHLORIDE 0.9 % (FLUSH) 0.9 %
10 SYRINGE (ML) INJECTION AS NEEDED
Status: DISCONTINUED | OUTPATIENT
Start: 2018-03-26 | End: 2018-03-26 | Stop reason: HOSPADM

## 2018-03-26 RX ORDER — METOCLOPRAMIDE 5 MG/1
5 TABLET ORAL 3 TIMES DAILY PRN
Qty: 15 TABLET | Refills: 0 | Status: SHIPPED | OUTPATIENT
Start: 2018-03-26 | End: 2018-05-07

## 2018-03-26 RX ADMIN — IOPAMIDOL 100 ML: 612 INJECTION, SOLUTION INTRAVENOUS at 12:37

## 2018-03-26 NOTE — ED PROVIDER NOTES
Subjective   History of Present Illness   42M with a history of diabetes, hypertension, prior MI, morbid obesity presenting with left upper quadrant abdominal pain.  He describes the pain as a twisting feeling that is constant nature.  States that he is having this pain for about a month but is worse over the last week with associated nausea and diarrhea.  Denies any fevers, chills, dysuria, frequency, or other specific symptoms.  Sent by his primary care for further evaluation.    Review of Systems   Gastrointestinal: Positive for abdominal pain, diarrhea and nausea.   All other systems reviewed and are negative.      Past Medical History:   Diagnosis Date   • Arthritis    • Depression    • Diabetes    • High cholesterol    • Hypertension    • Myocardial infarction        No Known Allergies    Past Surgical History:   Procedure Laterality Date   • APPENDECTOMY     • CARDIAC CATHETERIZATION     • CARDIAC CATHETERIZATION N/A 9/30/2017    Procedure: Coronary angiography;  Surgeon: Zander Bobo MD;  Location: [facility] INVASIVE LOCATION;  Service:        Family History   Problem Relation Age of Onset   • Diabetes Mother    • Alcohol abuse Father    • Diabetes Father    • Heart disease Father        Social History     Social History   • Marital status:      Social History Main Topics   • Smoking status: Former Smoker   • Smokeless tobacco: Never Used   • Alcohol use No   • Drug use: No   • Sexual activity: Defer     Other Topics Concern   • Not on file           Objective   Physical Exam   Constitutional: He is oriented to person, place, and time. He appears well-developed and well-nourished. No distress.   HENT:   Head: Normocephalic and atraumatic.   Mouth/Throat: Oropharynx is clear and moist.   Eyes: Conjunctivae are normal. No scleral icterus.   Neck: Normal range of motion. Neck supple. No tracheal deviation present.   Cardiovascular: Normal rate, regular rhythm, normal heart sounds and intact distal  pulses.  Exam reveals no gallop and no friction rub.    No murmur heard.  Pulmonary/Chest: Effort normal and breath sounds normal. No stridor. No respiratory distress. He has no wheezes. He has no rales. He exhibits no tenderness.   Abdominal: Soft. He exhibits no distension and no mass. There is tenderness (TTP LUQ). There is guarding. There is no rebound.   Musculoskeletal: Normal range of motion. He exhibits no deformity.   Neurological: He is alert and oriented to person, place, and time.   Skin: Skin is warm and dry. No rash noted. He is not diaphoretic. No erythema. No pallor.   Psychiatric: He has a normal mood and affect. His behavior is normal.   Nursing note and vitals reviewed.      Procedures         ED Course  ED Course                  MDM   42-year-old male here with left upper quadrant abdominal pain with associated nausea and diarrhea.  Afebrile, vital signs stable.  He is asystole tenderness left lower quadrant with some guarding.  Concern for possibly peptic ulcer disease versus transverse colitis versus other.    1:07 PM labs and CT scan are reassuring.  Discussed this with the patient and will discharge home with short prescription for Reglan for nausea and regulation to call and follow-up with his primary care provider for repeat assessment    Final diagnoses:   LUQ abdominal pain            Phillip Cordova MD  03/26/18 2502

## 2018-05-07 ENCOUNTER — PREP FOR SURGERY (OUTPATIENT)
Dept: OTHER | Facility: HOSPITAL | Age: 43
End: 2018-05-07

## 2018-05-07 ENCOUNTER — OFFICE VISIT (OUTPATIENT)
Dept: GASTROENTEROLOGY | Facility: CLINIC | Age: 43
End: 2018-05-07

## 2018-05-07 VITALS
HEART RATE: 76 BPM | SYSTOLIC BLOOD PRESSURE: 137 MMHG | RESPIRATION RATE: 18 BRPM | HEIGHT: 76 IN | DIASTOLIC BLOOD PRESSURE: 76 MMHG | BODY MASS INDEX: 38.36 KG/M2 | WEIGHT: 315 LBS | TEMPERATURE: 98.5 F

## 2018-05-07 DIAGNOSIS — R10.12 LEFT UPPER QUADRANT PAIN: Primary | ICD-10-CM

## 2018-05-07 DIAGNOSIS — R12 HEARTBURN: ICD-10-CM

## 2018-05-07 DIAGNOSIS — R10.12 LUQ PAIN: Primary | ICD-10-CM

## 2018-05-07 DIAGNOSIS — R19.7 DIARRHEA, UNSPECIFIED TYPE: ICD-10-CM

## 2018-05-07 DIAGNOSIS — R19.7 DIARRHEA: ICD-10-CM

## 2018-05-07 DIAGNOSIS — R11.0 NAUSEA: ICD-10-CM

## 2018-05-07 DIAGNOSIS — R13.10 DYSPHAGIA, UNSPECIFIED TYPE: ICD-10-CM

## 2018-05-07 PROCEDURE — 99244 OFF/OP CNSLTJ NEW/EST MOD 40: CPT | Performed by: INTERNAL MEDICINE

## 2018-05-07 RX ORDER — METRONIDAZOLE 250 MG/1
TABLET ORAL
Qty: 28 TABLET | Refills: 0 | Status: SHIPPED | OUTPATIENT
Start: 2018-05-07 | End: 2018-06-18

## 2018-05-07 RX ORDER — FAMOTIDINE 20 MG/1
20 TABLET, FILM COATED ORAL 2 TIMES DAILY
Qty: 60 TABLET | Refills: 1 | Status: SHIPPED | OUTPATIENT
Start: 2018-05-07 | End: 2019-01-14

## 2018-05-07 RX ORDER — CIPROFLOXACIN 500 MG/1
TABLET, FILM COATED ORAL
Qty: 14 TABLET | Refills: 0 | Status: SHIPPED | OUTPATIENT
Start: 2018-05-07 | End: 2018-06-18

## 2018-05-07 NOTE — PROGRESS NOTES
"Chief Complaint   Patient presents with   • Abdominal Pain     History of Present Illness     There is history of left upper quadrant and adjacent left sided abdominal pain off and on for the last 2-3 months .  The pain is gradual in onset, moderate to severe and \"twisting\" in character.  Frequency being several times a day.  The pain may last for 5-10 minutes minutes.  There is no radiation of abdominal pain.  There is association of abdominal pain with eating spicy foods which tend to increase the pain..  No definite relieving factors of abdominal pain.     The patient has history of recurrent nausea for the last 1 month.  The nausea is described as mild, frequency being a couple of times a week.  Nauseous feeling may last for several minutes.  Eating worsens the symptom however no definite relieving factors of nausea.  There is no associated vomiting. Lately. This has improved.    The patient has a history of reflux off and on for the last  I month.  The reflux is mildly severe.  Symptoms are described as retrosternal burning sensation, and indigestion.  There is no history of occasional regurgitative symptoms.  Frequency being 1-2 per week.  The symptoms are worse at night.  The patient takes Pepcid AC once a day over-the-counter reasonable control of his symptoms.     The patient has difficulty swallowing off and for the last 1 year . The symptom is moderate to severe, occurs on daily basis and is associated mostly with solid foods.  The symptoms are not progressive.  The patient points towards the lower substernal area.  There is no associated weight loss. The patient has lost about 3-5 pounds over the last 4-6 weeks.    The patient has history of diarrhea off-and-on for the last 2-3 months.  Severity is moderate, frequency of bowel movements being 3-5 times a day.  The stools are described as loose and at times watery.  Occasionally, there is a nocturnal element of diarrhea. The diarrhea is associated with " "tenesmus at times. There is no associated bright red blood per rectum.    There is no history of hematemesis or melena. The patient denies history of constipation. There is no history of liver or pancreatic disease. There is no family history of colon cancer, inflammatory bowel disease or chronic liver disease. There is no previous history of colonoscopy or upper endoscopy in the past.  The patient has history of sleep apnea. The patient uses BiPAP. The patient is rather overweight. At age 22 he lost about 100 pounds intentionally. The patient used to weigh 500 pounds. Currently the patient weighs about 387 pounds. Of interest that the patient claims that he was told to have \"heart attack\" at age 22 and was admitted in the ICU in [facility]. The patient had a cardiac catheterization in 2017 however his coronaries were normal. The patient also had normal left ventricular ejection fraction. Currently, the patient denies chest pains. The patient is  and has 3 children. There is history of diabetes for the last 10 years.    Review of Systems   Constitutional: Negative for appetite change, chills, fatigue, fever and unexpected weight change.   HENT: Positive for trouble swallowing. Negative for mouth sores and nosebleeds.    Eyes: Negative for discharge and redness.   Respiratory: Positive for apnea. Negative for cough and shortness of breath.    Cardiovascular: Negative for chest pain, palpitations and leg swelling.   Gastrointestinal: Positive for abdominal pain, diarrhea and nausea. Negative for abdominal distention, anal bleeding, blood in stool, constipation and vomiting.   Endocrine: Negative for cold intolerance, heat intolerance and polydipsia.   Genitourinary: Negative for dysuria, hematuria and urgency.   Musculoskeletal: Positive for arthralgias. Negative for joint swelling and myalgias.   Skin: Negative for rash.   Allergic/Immunologic: Negative for food allergies and immunocompromised state. "   Neurological: Negative for dizziness, seizures, syncope and headaches.   Hematological: Negative for adenopathy. Does not bruise/bleed easily.   Psychiatric/Behavioral: Negative for dysphoric mood. The patient is not nervous/anxious and is not hyperactive.      Patient Active Problem List   Diagnosis   • Headache   • Restless legs syndrome   • Obstructive sleep apnea syndrome   • Excessive daytime sleepiness   • History of myocardial infarction   • Other chest pain   • Esophageal spasm   • Reactive airway disease with wheezing, moderate persistent, uncomplicated   • Essential hypertension   • Type 2 diabetes mellitus   • Depression   • Facial droop   • Weakness of left leg   • Bell's palsy     Past Medical History:   Diagnosis Date   • Arthritis    • Bell's palsy    • Depression    • Diabetes    • High cholesterol    • Hypertension    • Myocardial infarction    • Sleep apnea      Past Surgical History:   Procedure Laterality Date   • APPENDECTOMY  1990   • CARDIAC CATHETERIZATION     • CARDIAC CATHETERIZATION N/A 9/30/2017    Procedure: Coronary angiography;  Surgeon: Zander Bobo MD;  Location: [facility] INVASIVE LOCATION;  Service:    • KNEE SURGERY Left     removed infection/mass in knee     Family History   Problem Relation Age of Onset   • Diabetes Mother    • Alcohol abuse Father    • Diabetes Father    • Heart disease Father      Social History   Substance Use Topics   • Smoking status: Former Smoker   • Smokeless tobacco: Never Used   • Alcohol use No       Current Outpatient Prescriptions:   •  budesonide-formoterol (SYMBICORT) 160-4.5 MCG/ACT inhaler, Inhale 2 puffsby mouth 2 (Two) Times a Day., Disp: 10.2 g, Rfl: 0  •  carvedilol (COREG) 12.5 MG tablet, Take 12.5 mg by mouth 2 (Two) Times a Day., Disp: , Rfl:   •  cetirizine (ZyrTEC) 10 MG tablet, Take 10 mg by mouth Daily., Disp: , Rfl:   •  citalopram (CeleXA) 20 MG tablet, Take 20 mg by mouth Daily., Disp: , Rfl:   •  gabapentin (NEURONTIN)  "600 MG tablet, Take 600 mg by mouth 2 (Two) Times a Day. tid, Disp: , Rfl:   •  glyBURIDE (DIABETA) 5 MG tablet, Take 5 mg by mouth Daily With Breakfast., Disp: , Rfl:   •  hydrochlorothiazide (HYDRODIURIL) 25 MG tablet, Take 25 mg by mouth Daily., Disp: , Rfl:   •  lisinopril (PRINIVIL,ZESTRIL) 40 MG tablet, Take 40 mg by mouth Daily., Disp: , Rfl:   •  metFORMIN (GLUCOPHAGE) 1000 MG tablet, Take 1,000 mg by mouth 2 (Two) Times a Day With Meals., Disp: , Rfl:   •  Omega 3 1200 MG capsule, Take 1,000 mg by mouth 2 (Two) Times a Day., Disp: , Rfl:   •  simvastatin (ZOCOR) 20 MG tablet, Take 20 mg by mouth Every Night., Disp: , Rfl:   •  ciprofloxacin (CIPRO) 500 MG tablet, Take 1 tablet by mouth twice a day x 7 days, Disp: 14 tablet, Rfl: 0  •  famotidine (PEPCID) 20 MG tablet, Take 1 tablet by mouth 2 (Two) Times a Day., Disp: 60 tablet, Rfl: 1  •  metroNIDAZOLE (FLAGYL) 250 MG tablet, Take 1 tablet by mouth four times a day x 7 days, Disp: 28 tablet, Rfl: 0    No Known Allergies    Blood pressure 137/76, pulse 76, temperature 98.5 °F (36.9 °C), resp. rate 18, height 193 cm (75.98\"), weight (!) 176 kg (387 lb).    Physical Exam   Constitutional: He is oriented to person, place, and time. He appears well-developed and well-nourished. No distress.   HENT:   Head: Normocephalic and atraumatic.   Right Ear: Hearing and external ear normal.   Left Ear: Hearing and external ear normal.   Nose: Nose normal.   Mouth/Throat: Oropharynx is clear and moist and mucous membranes are normal. Mucous membranes are not pale, not dry and not cyanotic. No oral lesions. No oropharyngeal exudate.   Eyes: Conjunctivae and EOM are normal. Right eye exhibits no discharge. Left eye exhibits no discharge. No scleral icterus.   Neck: Trachea normal. Neck supple. No JVD present. No edema present. No thyroid mass and no thyromegaly present.   Cardiovascular: Normal rate, regular rhythm, S2 normal and normal heart sounds.  Exam reveals no " gallop, no S3 and no friction rub.    No murmur heard.  Pulmonary/Chest: Effort normal and breath sounds normal. No respiratory distress. He has no wheezes. He has no rales. He exhibits no tenderness.   Abdominal: Soft. Normal appearance and bowel sounds are normal. He exhibits no distension, no ascites and no mass. There is no splenomegaly or hepatomegaly. There is tenderness in the epigastric area, left upper quadrant and left lower quadrant. There is no rigidity, no rebound and no guarding. No hernia.   Musculoskeletal: He exhibits no tenderness or deformity.     Vascular Status -  His right foot exhibits no edema. His left foot exhibits no edema.  Lymphadenopathy:     He has no cervical adenopathy.        Left: No supraclavicular adenopathy present.   Neurological: He is alert and oriented to person, place, and time. He has normal strength. No cranial nerve deficit or sensory deficit. He exhibits normal muscle tone. Coordination normal.   Skin: No rash noted. He is not diaphoretic. No cyanosis. No pallor. Nails show no clubbing.   Psychiatric: He has a normal mood and affect. His behavior is normal. Judgment and thought content normal.   Nursing note and vitals reviewed.  Stigmata of chronic liver disease:  None.  Asterixis:  None.    Laboratory Testing:  Upon review of medical records:      Dated November 1, 2017 total cholesterol 134.  Triglycerides 414.    Dated March 26, 2018 sodium 146 potassium 4.5 chloride 104 CO2 29 BUN 15 serum creatinine 0.70 glucose 174.  Calcium 9.7.  Total protein 7.4.  Albumin 4.30.  T bili 0.7 AST 27 ALT 46 alkaline phosphatase 78.  Lipase 73.  WBC 8.39 hemoglobin 15.1 hematocrit 44.5 MCV 88.3 and platelet count 164.    Abdominal Imaging:  Upon review of medical records:    Dated March 26, 2018 the patient underwent a CT of the abdomen and pelvis with IV contrast which revealed: Clear lung bases.  Heart is normal in size.  Liver is diffusely hypodense consistent with fatty  "infiltration.  Spleen is unremarkable.  No adrenal masses present.  Pancreas is normal.  Kidneys are normal.  Aorta is normal in caliber.  No free fluid or adenopathy.  Abdominal portions of the GI tract are unremarkable with no evidence of obstruction.  Appendix is not identified.  Urinary bladder is unremarkable.  No significant free fluid or adenopathy in the pelvis.    Assessment:      ICD-10-CM ICD-9-CM   1. Left upper quadrant pain R10.12 789.02   2. Diarrhea, unspecified type R19.7 787.91   3. Heartburn R12 787.1   4. Nausea R11.0 787.02   5. Dysphagia, unspecified type R13.10 787.20         Discussion:  1. Examination is con diverticulitis.    Plan/  Patient Instructions   1. Low fiber diet (avoid foods, vegetables, cereals, fiber supplements, and seeds) for 5 days thereafter low-fat high-fiber diet.  2. Cipro (ciprofloxacin) tablets 500 mg. Take 1 tablet by mouth twice a day for 7 days. Side effects were discussed.  3. Flagyl (metronidazole) tablets 250 mg. Take 1 tablet one by mouth 4 times a day for 7 days.  Side effects were discussed.  4. Avoid laxatives, enemas for next 5 days.  However, for constipation the patient may use \"stool softeners\" 1-3 per day.  5. May take probiotics such as Align.  Take one orally daily while taking antibiotics and 1-2 weeks thereafter.  6. Colonoscopy: Description of the procedure, risks benefits alternatives and options including non-operative options were discussed with the patient in detail.  The patient understands and wishes to proceed.  This may however be postponed for about 3-4 weeks.  7. Discontinue Pepcid AC.  8. Pepcid 20 mg 1 by mouth twice a day for 6-8 weeks.  9. The patient should undergo an upper endoscopy for evaluation of his dysphagic symptoms in the future.  10. Anti-reflex measures and weight reduction. The patient should avoid drinking soda beverages.  11. The patient has been advised to call back in one week regarding progress.  12. The patient has " been advised to bring his BiPAP mask and machine with him on the day of colonoscopy.  13. Ask us with the patient in detail. Opportunity was given to ask questions.       Sandro Potter MD

## 2018-05-07 NOTE — PATIENT INSTRUCTIONS
"1. Low fiber diet (avoid foods, vegetables, cereals, fiber supplements, and seeds) for 5 days thereafter low-fat high-fiber diet.  2. Cipro (ciprofloxacin) tablets 500 mg. Take 1 tablet by mouth twice a day for 7 days. Side effects were discussed.  3. Flagyl (metronidazole) tablets 250 mg. Take 1 tablet one by mouth 4 times a day for 7 days.  Side effects were discussed.  4. Avoid laxatives, enemas for next 5 days.  However, for constipation the patient may use \"stool softeners\" 1-3 per day.  5. May take probiotics such as Align.  Take one orally daily while taking antibiotics and 1-2 weeks thereafter.  6. Colonoscopy: Description of the procedure, risks benefits alternatives and options including non-operative options were discussed with the patient in detail.  The patient understands and wishes to proceed.  This may however be postponed for about 3-4 weeks.  7. Discontinue Pepcid AC.  8. Pepcid 20 mg 1 by mouth twice a day for 6-8 weeks.  9. The patient should undergo an upper endoscopy for evaluation of his dysphagic symptoms in the future.  10. Anti-reflex measures and weight reduction. The patient should avoid drinking soda beverages.  11. The patient has been advised to call back in one week regarding progress.  12. The patient has been advised to bring his BiPAP mask and machine with him on the day of colonoscopy.  13. Ask us with the patient in detail. Opportunity was given to ask questions.  "

## 2018-05-08 RX ORDER — SODIUM CHLORIDE 9 MG/ML
70 INJECTION, SOLUTION INTRAVENOUS CONTINUOUS PRN
Status: CANCELLED | OUTPATIENT
Start: 2018-05-08

## 2018-05-09 PROBLEM — R19.7 DIARRHEA: Status: ACTIVE | Noted: 2018-05-09

## 2018-05-09 PROBLEM — R10.12 LUQ PAIN: Status: ACTIVE | Noted: 2018-05-09

## 2018-06-16 ENCOUNTER — APPOINTMENT (OUTPATIENT)
Dept: GENERAL RADIOLOGY | Facility: HOSPITAL | Age: 43
End: 2018-06-16

## 2018-06-16 ENCOUNTER — HOSPITAL ENCOUNTER (EMERGENCY)
Facility: HOSPITAL | Age: 43
Discharge: HOME | End: 2018-06-16
Attending: EMERGENCY MEDICINE | Admitting: EMERGENCY MEDICINE

## 2018-06-16 VITALS
HEART RATE: 77 BPM | TEMPERATURE: 98 F | WEIGHT: 315 LBS | HEIGHT: 76 IN | RESPIRATION RATE: 18 BRPM | SYSTOLIC BLOOD PRESSURE: 138 MMHG | OXYGEN SATURATION: 98 % | BODY MASS INDEX: 38.36 KG/M2 | DIASTOLIC BLOOD PRESSURE: 78 MMHG

## 2018-06-16 DIAGNOSIS — S63.91XA SPRAIN OF RIGHT HAND, INITIAL ENCOUNTER: Primary | ICD-10-CM

## 2018-06-16 PROCEDURE — 99283 EMERGENCY DEPT VISIT LOW MDM: CPT

## 2018-06-16 PROCEDURE — 73130 X-RAY EXAM OF HAND: CPT

## 2018-06-16 RX ORDER — ACETAMINOPHEN 500 MG
1000 TABLET ORAL ONCE
Status: COMPLETED | OUTPATIENT
Start: 2018-06-16 | End: 2018-06-16

## 2018-06-16 RX ORDER — IBUPROFEN 800 MG/1
800 TABLET ORAL EVERY 8 HOURS PRN
Qty: 20 TABLET | Refills: 0 | Status: SHIPPED | OUTPATIENT
Start: 2018-06-16 | End: 2021-02-01

## 2018-06-16 RX ADMIN — ACETAMINOPHEN 1000 MG: 500 TABLET, FILM COATED ORAL at 05:44

## 2018-06-16 NOTE — ED PROVIDER NOTES
Subjective   History of Present Illness  TRIAGE CHIEF COMPLAINT:   Chief Complaint   Patient presents with   • Hand Pain         HPI: Raul Welch   is a 42 y.o. male   who presents to the emergency department complaining of Right hand pain.  Patient with a history of diabetes, hypertension, CAD, depression, morbid obesity.  Patient reports right hand pain with slight swelling ongoing for the past 3 days.  Pain is diffuse especially over the dorsal aspect of the hand.  Patient also describes discomfort when grasping objects and making a fist.  Denies any redness, skin wounds, or fever.  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  He took between 1200 mg and 1600 mg of Motrin 1 hour prior to arrival.  Patient also needs a work note.            Review of Systems   All other systems reviewed and are negative.      Past Medical History:   Diagnosis Date   • Arthritis    • Bell's palsy    • Depression    • Diabetes    • Diverticulitis    • High cholesterol    • Hypertension    • Myocardial infarction    • Sleep apnea        No Known Allergies    Past Surgical History:   Procedure Laterality Date   • APPENDECTOMY  1990   • CARDIAC CATHETERIZATION     • CARDIAC CATHETERIZATION N/A 9/30/2017    Procedure: Coronary angiography;  Surgeon: Zander Bobo MD;  Location: [facility] CATH INVASIVE LOCATION;  Service:    • KNEE SURGERY Left     removed infection/mass in knee       Family History   Problem Relation Age of Onset   • Diabetes Mother    • Alcohol abuse Father    • Diabetes Father    • Heart disease Father        Social History     Social History   • Marital status:      Social History Main Topics   • Smoking status: Former Smoker   • Smokeless tobacco: Never Used   • Alcohol use No   • Drug use: No   • Sexual activity: Defer     Other Topics Concern   • Not on file           Objective   Physical Exam        CONSTITUTIONAL: Awake, oriented,  appears non-toxic.  Obese.   HENT: Atraumatic, normocephalic, oral mucosa pink and moist, airway patent. Nares patent without drainage. External ears normal.   EYES: Conjunctiva clear, EOMI, PERRL   NECK: Trachea midline, non-tender, supple   CARDIOVASCULAR: Normal heart rate, Normal rhythm, No murmurs, rubs, gallops   PULMONARY/CHEST: Clear to auscultation, no rhonchi, wheezes, or rales. Symmetrical breath sounds. Non-tender.   ABDOMINAL: Non-distended, soft, non-tender - no rebound or guarding. BS normal.   NEUROLOGIC: Non-focal, moving all four extremities, no gross sensory or motor deficits.   EXTREMITIES: No clubbing, cyanosis.  Questionable mild swelling of the right hand diffusely however no warmth, erythema, or skin lesions.  He is able to make a fist however with some discomfort.   SKIN: Warm, Dry, No erythema, No rash     XR Hand 3+ View Right    (Results Pending)     XR: no acute findings.       EKG:           Procedures           ED Course        ED COURSE / MEDICAL DECISION MAKING:   Nursing notes reviewed.    Possible sprain of the right hand.  No skin wounds, redness, warmth, or fever to suggest infection.  Patient does overuse his hands at work with frequent/repetitive heavy lifting of boxes.  Plan for splint, Motrin, close outpatient follow-up versus return if worse.    DECISION TO DISCHARGE/ADMIT: see ED care timeline       Electronically signed by: Guillermo Patricia MD, 6/16/2018 6:15 AM                [facility]  Final diagnoses:   Sprain of right hand, initial encounter            Guillermo Patricia MD  06/16/18 0616

## 2018-06-25 ENCOUNTER — ANESTHESIA EVENT (OUTPATIENT)
Dept: GASTROENTEROLOGY | Facility: HOSPITAL | Age: 43
End: 2018-06-25

## 2018-06-25 ENCOUNTER — ANESTHESIA (OUTPATIENT)
Dept: GASTROENTEROLOGY | Facility: HOSPITAL | Age: 43
End: 2018-06-25

## 2018-06-25 ENCOUNTER — HOSPITAL ENCOUNTER (OUTPATIENT)
Facility: HOSPITAL | Age: 43
Setting detail: HOSPITAL OUTPATIENT SURGERY
Discharge: HOME | End: 2018-06-25
Attending: INTERNAL MEDICINE | Admitting: INTERNAL MEDICINE

## 2018-06-25 VITALS
RESPIRATION RATE: 18 BRPM | HEART RATE: 69 BPM | OXYGEN SATURATION: 98 % | SYSTOLIC BLOOD PRESSURE: 120 MMHG | BODY MASS INDEX: 38.36 KG/M2 | WEIGHT: 315 LBS | TEMPERATURE: 99.1 F | HEIGHT: 76 IN | DIASTOLIC BLOOD PRESSURE: 70 MMHG

## 2018-06-25 DIAGNOSIS — R10.12 LUQ PAIN: ICD-10-CM

## 2018-06-25 DIAGNOSIS — R19.7 DIARRHEA: ICD-10-CM

## 2018-06-25 LAB — GLUCOSE BLDC GLUCOMTR-MCNC: 149 MG/DL (ref 70–130)

## 2018-06-25 PROCEDURE — S0260 H&P FOR SURGERY: HCPCS | Performed by: INTERNAL MEDICINE

## 2018-06-25 PROCEDURE — 82962 GLUCOSE BLOOD TEST: CPT

## 2018-06-25 PROCEDURE — 45385 COLONOSCOPY W/LESION REMOVAL: CPT | Performed by: INTERNAL MEDICINE

## 2018-06-25 PROCEDURE — 25010000002 PROPOFOL 200 MG/20ML EMULSION: Performed by: NURSE ANESTHETIST, CERTIFIED REGISTERED

## 2018-06-25 PROCEDURE — 25010000002 MIDAZOLAM PER 1 MG: Performed by: NURSE ANESTHETIST, CERTIFIED REGISTERED

## 2018-06-25 PROCEDURE — 45380 COLONOSCOPY AND BIOPSY: CPT | Performed by: INTERNAL MEDICINE

## 2018-06-25 RX ORDER — KETAMINE HYDROCHLORIDE 50 MG/ML
INJECTION, SOLUTION, CONCENTRATE INTRAMUSCULAR; INTRAVENOUS AS NEEDED
Status: DISCONTINUED | OUTPATIENT
Start: 2018-06-25 | End: 2018-06-25 | Stop reason: SURG

## 2018-06-25 RX ORDER — SODIUM CHLORIDE 9 MG/ML
70 INJECTION, SOLUTION INTRAVENOUS CONTINUOUS PRN
Status: DISCONTINUED | OUTPATIENT
Start: 2018-06-25 | End: 2018-06-25 | Stop reason: HOSPADM

## 2018-06-25 RX ORDER — PROPOFOL 10 MG/ML
INJECTION, EMULSION INTRAVENOUS AS NEEDED
Status: DISCONTINUED | OUTPATIENT
Start: 2018-06-25 | End: 2018-06-25 | Stop reason: SURG

## 2018-06-25 RX ORDER — SODIUM CHLORIDE 0.9 % (FLUSH) 0.9 %
3 SYRINGE (ML) INJECTION AS NEEDED
Status: DISCONTINUED | OUTPATIENT
Start: 2018-06-25 | End: 2018-06-25 | Stop reason: HOSPADM

## 2018-06-25 RX ORDER — MIDAZOLAM HYDROCHLORIDE 1 MG/ML
INJECTION INTRAMUSCULAR; INTRAVENOUS AS NEEDED
Status: DISCONTINUED | OUTPATIENT
Start: 2018-06-25 | End: 2018-06-25 | Stop reason: SURG

## 2018-06-25 RX ORDER — MEPERIDINE HYDROCHLORIDE 50 MG/ML
INJECTION INTRAMUSCULAR; INTRAVENOUS; SUBCUTANEOUS AS NEEDED
Status: DISCONTINUED | OUTPATIENT
Start: 2018-06-25 | End: 2018-06-25 | Stop reason: SURG

## 2018-06-25 RX ADMIN — PROPOFOL 50 MG: 10 INJECTION, EMULSION INTRAVENOUS at 12:13

## 2018-06-25 RX ADMIN — PROPOFOL 50 MG: 10 INJECTION, EMULSION INTRAVENOUS at 12:07

## 2018-06-25 RX ADMIN — PROPOFOL 50 MG: 10 INJECTION, EMULSION INTRAVENOUS at 11:49

## 2018-06-25 RX ADMIN — KETAMINE HYDROCHLORIDE 25 MG: 50 INJECTION, SOLUTION INTRAMUSCULAR; INTRAVENOUS at 11:49

## 2018-06-25 RX ADMIN — MEPERIDINE HYDROCHLORIDE 50 MG: 50 INJECTION, SOLUTION INTRAMUSCULAR; INTRAVENOUS; SUBCUTANEOUS at 11:46

## 2018-06-25 RX ADMIN — SODIUM CHLORIDE: 9 INJECTION, SOLUTION INTRAVENOUS at 11:36

## 2018-06-25 RX ADMIN — PROPOFOL 50 MG: 10 INJECTION, EMULSION INTRAVENOUS at 11:54

## 2018-06-25 RX ADMIN — PROPOFOL 30 MG: 10 INJECTION, EMULSION INTRAVENOUS at 12:22

## 2018-06-25 RX ADMIN — KETAMINE HYDROCHLORIDE 25 MG: 50 INJECTION, SOLUTION INTRAMUSCULAR; INTRAVENOUS at 12:24

## 2018-06-25 RX ADMIN — PROPOFOL 50 MG: 10 INJECTION, EMULSION INTRAVENOUS at 11:58

## 2018-06-25 RX ADMIN — MIDAZOLAM HYDROCHLORIDE 2 MG: 1 INJECTION, SOLUTION INTRAMUSCULAR; INTRAVENOUS at 11:45

## 2018-06-25 RX ADMIN — PROPOFOL 50 MG: 10 INJECTION, EMULSION INTRAVENOUS at 12:02

## 2018-06-25 NOTE — ANESTHESIA PREPROCEDURE EVALUATION
Anesthesia Evaluation     Patient summary reviewed and Nursing notes reviewed   no history of anesthetic complications:  NPO Solid Status: > 8 hours  NPO Liquid Status: > 6 hours           Airway   Mallampati: II  TM distance: >3 FB  Neck ROM: full  Possible difficult intubation and Difficult intubation highly probable  Dental - normal exam     Pulmonary    (+) a smoker Current, asthma, shortness of breath, sleep apnea, decreased breath sounds,   Cardiovascular   Exercise tolerance: good (4-7 METS)    Patient on routine beta blocker  Rhythm: regular  Rate: normal    (+) hypertension, past MI (1996)  >12 months, FELDMAN, PVD, hyperlipidemia,   CAD:  INC RISK OBESE, GONZÁLEZ, MI DMi.      Neuro/Psych  (+) neuromuscular disease, headaches, weakness, psychiatric history Anxiety and Depression,     GI/Hepatic/Renal/Endo    (+) obesity, morbid obesity, GERD, GI bleeding, diabetes mellitus,     Musculoskeletal     (+) arthralgias, back pain, chronic pain,   Abdominal    Substance History      OB/GYN          Other   (+) arthritis     ROS/Med Hx Other: H/o non compliance                Anesthesia Plan    ASA 3     MAC     intravenous induction   Anesthetic plan and risks discussed with patient.    Plan discussed with CRNA.

## 2018-06-27 ENCOUNTER — OFFICE VISIT (OUTPATIENT)
Dept: NEUROLOGY | Facility: CLINIC | Age: 43
End: 2018-06-27

## 2018-06-27 VITALS
HEIGHT: 76 IN | SYSTOLIC BLOOD PRESSURE: 114 MMHG | HEART RATE: 81 BPM | OXYGEN SATURATION: 97 % | WEIGHT: 315 LBS | DIASTOLIC BLOOD PRESSURE: 80 MMHG | BODY MASS INDEX: 38.36 KG/M2

## 2018-06-27 DIAGNOSIS — G47.19 EXCESSIVE DAYTIME SLEEPINESS: ICD-10-CM

## 2018-06-27 DIAGNOSIS — G47.33 OBSTRUCTIVE SLEEP APNEA SYNDROME: Primary | ICD-10-CM

## 2018-06-27 PROCEDURE — 99212 OFFICE O/P EST SF 10 MIN: CPT | Performed by: NURSE PRACTITIONER

## 2018-06-27 RX ORDER — MODAFINIL 100 MG/1
100 TABLET ORAL DAILY
Qty: 30 TABLET | Refills: 2 | Status: SHIPPED | OUTPATIENT
Start: 2018-06-27 | End: 2018-09-05 | Stop reason: SDUPTHER

## 2018-06-28 LAB
LAB AP CASE REPORT: NORMAL
PATH REPORT.FINAL DX SPEC: NORMAL

## 2018-07-05 ENCOUNTER — TELEPHONE (OUTPATIENT)
Dept: GASTROENTEROLOGY | Facility: CLINIC | Age: 43
End: 2018-07-05

## 2018-07-05 NOTE — TELEPHONE ENCOUNTER
REVIEWED PATHOLOGY OF COLONOSCOPY WITH PATIENT WHO VERBALIZED UNDERSTANDING. PATIENT HAS FOLLOW-UP FOR FURTHER DISCUSSION AND QUESTIONS.

## 2018-07-19 RX ORDER — FAMOTIDINE 20 MG/1
TABLET, FILM COATED ORAL
Qty: 60 TABLET | Refills: 0 | OUTPATIENT
Start: 2018-07-19

## 2018-08-23 RX ORDER — FAMOTIDINE 20 MG/1
TABLET, FILM COATED ORAL
Qty: 60 TABLET | Refills: 3 | OUTPATIENT
Start: 2018-08-23

## 2018-08-24 RX ORDER — FAMOTIDINE 20 MG/1
TABLET, FILM COATED ORAL
Qty: 60 TABLET | Refills: 0 | OUTPATIENT
Start: 2018-08-24

## 2018-09-05 ENCOUNTER — OFFICE VISIT (OUTPATIENT)
Dept: NEUROLOGY | Facility: CLINIC | Age: 43
End: 2018-09-05

## 2018-09-05 VITALS
DIASTOLIC BLOOD PRESSURE: 80 MMHG | WEIGHT: 315 LBS | BODY MASS INDEX: 38.36 KG/M2 | HEIGHT: 76 IN | HEART RATE: 64 BPM | OXYGEN SATURATION: 98 % | SYSTOLIC BLOOD PRESSURE: 136 MMHG

## 2018-09-05 DIAGNOSIS — G47.33 OBSTRUCTIVE SLEEP APNEA SYNDROME: Primary | ICD-10-CM

## 2018-09-05 PROCEDURE — 99213 OFFICE O/P EST LOW 20 MIN: CPT | Performed by: NURSE PRACTITIONER

## 2018-09-05 RX ORDER — MODAFINIL 100 MG/1
100 TABLET ORAL DAILY
Qty: 30 TABLET | Refills: 2 | Status: SHIPPED | OUTPATIENT
Start: 2018-09-05 | End: 2018-12-27 | Stop reason: SDUPTHER

## 2018-09-05 NOTE — PROGRESS NOTES
Subjective   Raul Welch is a 42 y.o. male     Chief Complaint   Patient presents with   • Follow-up     Pt is here for a FU for his CPAP and for a medicine check. Pt states that he has not been sleeping much, he states that he is in the process of moving.        History of Present Illness   Patient is a very pleasant 43-year-old male in clinic today to follow-up for his sleep apnea with BiPAP machine patient's sleep compliance was reviewed in clinic he is using his IPAP 100% of the time with corrected AHI of 1.4 BiPAP 25 IPAP and 15 EPAP states that his sleep apnea symptoms do resolve with the use of his BiPAP again patient as noted above has been moving and states these had a lack of sleep from moving but not from a sleep apnea patient is aware to continue walking 30 minutes every day.    Past Hx:Patient is very pleasant 42-year-old male in clinic today to follow up sleep apnea with BiPAP patient states the sleeping much better 8-10 hours sleep he wakes up still tired he has memory and mental cognition has improved per patient reviewed his sleep compliance and patient's AHI is a 2.0 with an IPAP of 25 and EPAP of 15.     Patient is very pleasant 42-year-old male in clinic today to follow-up for sleep apnea with BiPAP use.  He states he was working for a while and only having occasional's leak but states now he is back to feeling exhausted during the day again having word find issues with morning headaches he states that he's trying to use his BiPAP and the sleep compliance report is reviewed in clinic today and it does show the patient is using the BiPAP 100% at time for greater than 4 hours.  His BiPAP is supposed to be some IPAP 25 with EPAP 15 however in clinic today's reading that set at 20 cm IPAP and 18 cm EPAP which does not get enough distance in the pressures.       The following portions of the patient's history were reviewed and updated as appropriate: allergies, current medications, past family  "history, past medical history, past social history, past surgical history and problem list.      The following portions of the patient's history were reviewed and updated as appropriate: allergies, current medications, past family history, past medical history, past social history, past surgical history and problem list.    Review of Systems   Constitutional: Negative for chills and fever.   HENT: Negative for congestion, ear pain, hearing loss, rhinorrhea and sore throat.    Eyes: Negative for pain, discharge and redness.   Respiratory: Negative for cough, shortness of breath, wheezing and stridor.    Cardiovascular: Negative for chest pain, palpitations and leg swelling.   Gastrointestinal: Negative for abdominal pain, constipation, nausea and vomiting.   Endocrine: Negative for cold intolerance, heat intolerance and polyphagia.   Genitourinary: Negative for dysuria, flank pain, frequency and urgency.   Musculoskeletal: Positive for arthralgias and myalgias. Negative for joint swelling, neck pain and neck stiffness.   Skin: Negative for pallor, rash and wound.   Allergic/Immunologic: Negative for environmental allergies.   Neurological: Negative for dizziness, tremors, seizures, syncope, facial asymmetry, speech difficulty, weakness, light-headedness, numbness and headaches.   Hematological: Negative for adenopathy.   Psychiatric/Behavioral: Positive for sleep disturbance. Negative for confusion and hallucinations. The patient is not nervous/anxious.        Objective         Vitals:    09/05/18 1459   BP: 136/80   Pulse: 64   SpO2: 98%   Weight: (!) 171 kg (378 lb)   Height: 193 cm (76\")     GENERAL: Patient is pleasant, cooperative, appears to be stated age.  Body habitus is endomorphic.  HEAD:  Head is normocephalic and atraumatic.    NECK: Neck are non-tender without thyromegaly or adenopathy.  Carotic upstrokes are 1+/4.  No cranial or cervical bruits.  The neck is supple with a full range of motion.   ENT: " palate elevate symmetrically, no evidence of high arch palate, tongue midline erythema in posterior pharynx, Mallampati Classification Class III   CARDIOVASCULAR:  Regular rate and rhythm with normal S1 and S2 without rub or gallop.  RESPIRATORY:  Clear to auscultation without wheezes or crackle   PSYCH:  Higher cortical function/mental status:  The patient is alert.  He  is oriented x3 to time, place and person.  Recent and the remote memory appear normal.  The patient has a good fund of knowledge.  There is no visual or auditory hallucination or suicidal or homicidal ideation.  SPEECH:There is no gross evidence of aphasia, dysarthria or agnosia.      COORDINATION AND GAIT:  The patient walks with a narrow-based gait.    Results for orders placed or performed during the hospital encounter of 06/25/18   POC Glucose Once   Result Value Ref Range    Glucose 149 (H) 70 - 130 mg/dL   Tissue Pathology Exam   Result Value Ref Range    Case Report       Surgical Pathology Report                         Case: [id number]                                  Authorizing Provider:  Sandro Potter MD          Collected:           06/25/2018 12:04 PM          Ordering Location:     [facility]    Received:            06/25/2018 02:23 PM                                 [facility]                                                                    Pathologist:           Hakan Lnudy MD                                                           Specimens:   1) - Small Intestine, Ileum, terminal ileum biopsies                                                2) - Large Intestine, random colon biopsies                                                         3) - Large Intestine, Left / Descending Colon, polyp                                                4) - Large Intestine, Rectum, rectal polyp                                                          5) - Large Intestine, Sigmoid Colon, polyps                                                 Final Diagnosis       See Scanned Report         I have personally reviewed the above labs.    Assessment/Plan     1.  Sleep apnea: patient to continue with BiPAP is.  Sleep compliance revealed an AHI is corrected at 1.4 with BiPAP set at 25 IPAP and 15 EPAP patient is compliant 100% of the time.  We'll have patient return to clinic in one year.     I have counseled patient extensively on Sleep Hygiene including regular sleep wake schedule and stimulus control therapy. I have also discussed the importance of weight reduction because 10% reduction in body weight can reduce sleep apnea by 50 %. We have also discussed abstaining from smoking and drinking.  I have explained to patient that obstructive apnea episode is defined as the absence of airflow for at least 10 seconds.  Sleep apnea is usually accompanied by snoring, disturbed sleep, and daytime sleepiness. Patients with micrognathia, retrognathia, enlarged tonsils, tongue enlargement, and acromegaly are especially predisposed to obstructive sleep apnea. Abnormalities or weakness in the muscles can also contribute to obstructive sleep apnea. Obesity can also contribute to sleep apnea.  Sleep apnea can lead to a number of complications, ranging from daytime sleepiness to possible increased risk of cardiovascular risks.    Daytime sleepiness is the most serious. Daytime sleepiness can also increase the risk for accident-related injuries. Several studies have suggested that people with sleep apnea have two to three times as many car accidents, and five to seven times the risk for multiple accidents.    A number of cardiovascular diseases -- including high blood pressure, heart failure, stroke, and heart arrhythmias -- have an association with obstructive sleep apnea.    Up to a third of patients with heart failure also have sleep apnea. Both central and obstructive sleep apnea are linked with heart failure. Obstructive sleep apnea is also noted to be  associated with type 2 diabetes according to Dr. Cartwright at [facility].  The best treatment for symptomatic obstructive sleep apnea is continuous positive airflow pressure (CPAP). Bilevel positive airway pressure (BPAP) systems may be particularly helpful for patients with coexisting lung disease and those with excessive levels of carbon dioxide.  Other treatment options including UPPP surgery, LAUP surgery, radiofrequency somnoplasty and dental appliances such Kevin or Clearway.

## 2018-09-24 RX ORDER — FAMOTIDINE 20 MG/1
TABLET, FILM COATED ORAL
Qty: 60 TABLET | Refills: 5 | OUTPATIENT
Start: 2018-09-24

## 2018-10-25 RX ORDER — FAMOTIDINE 20 MG/1
TABLET, FILM COATED ORAL
Qty: 60 TABLET | Refills: 0 | OUTPATIENT
Start: 2018-10-25

## 2018-12-27 NOTE — TELEPHONE ENCOUNTER
Patient has not been seen since May 2018.  He was to be on this for 6-8 weeks at that visit. Do we need to refill this?  He has not followed up since Colonoscopy.

## 2018-12-28 RX ORDER — FAMOTIDINE 20 MG/1
TABLET, FILM COATED ORAL
Qty: 60 TABLET | Refills: 0 | OUTPATIENT
Start: 2018-12-28

## 2019-01-01 RX ORDER — MODAFINIL 100 MG/1
TABLET ORAL
Qty: 30 TABLET | Refills: 1 | Status: SHIPPED | OUTPATIENT
Start: 2019-01-01 | End: 2019-03-01 | Stop reason: SDUPTHER

## 2019-01-14 ENCOUNTER — OFFICE VISIT (OUTPATIENT)
Dept: UROLOGY | Facility: CLINIC | Age: 44
End: 2019-01-14

## 2019-01-14 VITALS
HEART RATE: 74 BPM | WEIGHT: 315 LBS | SYSTOLIC BLOOD PRESSURE: 142 MMHG | OXYGEN SATURATION: 98 % | DIASTOLIC BLOOD PRESSURE: 90 MMHG | BODY MASS INDEX: 38.36 KG/M2 | HEIGHT: 76 IN

## 2019-01-14 DIAGNOSIS — R79.89 LOW TESTOSTERONE: Primary | ICD-10-CM

## 2019-01-14 DIAGNOSIS — N52.9 ERECTILE DYSFUNCTION, UNSPECIFIED ERECTILE DYSFUNCTION TYPE: ICD-10-CM

## 2019-01-14 LAB
BILIRUB BLD-MCNC: NEGATIVE MG/DL
CLARITY, POC: CLEAR
COLOR UR: YELLOW
GLUCOSE UR STRIP-MCNC: NEGATIVE MG/DL
KETONES UR QL: NEGATIVE
LEUKOCYTE EST, POC: NEGATIVE
NITRITE UR-MCNC: NEGATIVE MG/ML
PH UR: 6 [PH] (ref 5–8)
PROT UR STRIP-MCNC: NEGATIVE MG/DL
RBC # UR STRIP: NEGATIVE /UL
SP GR UR: 1.03 (ref 1–1.03)
UROBILINOGEN UR QL: NORMAL

## 2019-01-14 PROCEDURE — 81003 URINALYSIS AUTO W/O SCOPE: CPT | Performed by: UROLOGY

## 2019-01-14 PROCEDURE — 99204 OFFICE O/P NEW MOD 45 MIN: CPT | Performed by: UROLOGY

## 2019-01-14 RX ORDER — SILDENAFIL CITRATE 20 MG/1
60 TABLET ORAL DAILY PRN
Qty: 30 TABLET | Refills: 11 | Status: SHIPPED | OUTPATIENT
Start: 2019-01-14 | End: 2020-10-27

## 2019-01-14 RX ORDER — LORATADINE 10 MG/1
10 TABLET ORAL DAILY
Refills: 0 | COMMUNITY
Start: 2018-12-07 | End: 2019-10-30

## 2019-01-14 RX ORDER — AMOXICILLIN 875 MG/1
TABLET, COATED ORAL
Refills: 0 | COMMUNITY
Start: 2018-12-07 | End: 2019-10-30

## 2019-01-14 NOTE — PROGRESS NOTES
Chief Complaint  Low Testosterone and Erectile Dysfunction        BOO Welch is a 43 y.o. male who presents today primarily concerned about erectile dysfunction.  He is a diabetic with hypertension so is at high risk.  He states he can occasionally perform spontaneously but never on demand.  He states he has a great libido and in fact it greatly exceeds that of his wife.  His primary care provider noted a low testosterone level but he has very mild symptoms of hypogonadism with decreased strength stamina and a tendency for depression.  When explaining that increasing his testosterone would only help his libido and not his erections he is not interested.  He's committed to losing some weight since he is morbidly obese and eating a better diet.    On my exam I noted a tender right testicle and he states he's had that evaluated by urology in the past and they found a normal testicle and bring the discomfort on pressure from his morbid obesity.    The patient is informed that the most common cause of erectile dysfunction is insufficient blood flow.  Atherosclerosis is extremely common in Americans who eat a diet high in animal fat and get insufficient exercise.  Patients who have hyperlipidemia and smoke cigarettes compound this affect.  The concept of diminished inflow from arterial sclerosis as well as diminished effectiveness of venous polsters that retain the blood in the cavernous spaces are presented to the patient.  The need for smoking cessation and eating a heart healthy diet along with increased physical activity is recommended in addition to the specific treatments for erectile dysfunction and hyperlipidemia.    Vitals:    01/14/19 1459   BP: 142/90   Pulse: 74   SpO2: 98%       Past Medical History  Past Medical History:   Diagnosis Date   • Arthritis    • Asthma     SPOUSE REPORTS ISSUES AS A CHILD   • Bell's palsy    • Blood in stool    • Depression    • Diabetes (CMS/[facility])    • Diarrhea     WITH ABDOMINAL  CRAMPING   • Diverticulitis    • Elevated cholesterol    • GERD (gastroesophageal reflux disease)    • High cholesterol    • History of being tatooed    • History of bronchitis    • History of fracture     HX OF FOOT (SPOUSE UNSURE LATERALITY)   • History of gout    • Hypertension    • Myocardial infarction (CMS/HCC)     SPOUSE REPORTS SOMEWHERE BETWEEN THE YEARS OF 2023-0289   • Sleep apnea     USES BIPAP HS - TO BRING IN THE DOS   • Wears glasses        Past Surgical History  Past Surgical History:   Procedure Laterality Date   • APPENDECTOMY  1990   • CARDIAC CATHETERIZATION     • CARDIAC CATHETERIZATION N/A 9/30/2017    Procedure: Coronary angiography;  Surgeon: Zander Bobo MD;  Location: [facility] CATH INVASIVE LOCATION;  Service:    • COLONOSCOPY N/A 6/25/2018    Procedure: COLONOSCOPY with hot snare polypectomy,  cold snare polypectomy, cold biopsy polypectomies, and biopsies;  Surgeon: Sandro Potter MD;  Location: [facility] ENDOSCOPY;  Service: Gastroenterology   • KNEE SURGERY Left     removed infection/mass in knee   • OTHER SURGICAL HISTORY      SPOUSE REPORTS GROWTH REMOVED FROM THROAT WHEN PATIENT WAS A YOUNG CHILD   • TONSILLECTOMY         Medications  has a current medication list which includes the following prescription(s): carvedilol, citalopram, glyburide, hydrochlorothiazide, lisinopril, metformin, omega 3, simvastatin, amoxicillin, budesonide-formoterol, cetirizine, ibuprofen, loratadine, and modafinil.      Allergies  No Known Allergies    Social History  Social History     Social History Narrative   • Not on file       Family History  He has no family history of bladder or kidney cancer  He has no family history of kidney stones      AUA Symptom Score:      Review of Systems  Review of Systems  Positive for feeling poorly tired low energy and fatigue depression eyes itch loss of hearing swollen ankles Lamb swelling lambert pain painful joints confusion dizziness difficulty walking depression  feeling of weakness negative and other categories.  Physical Exam  Physical Exam   Constitutional: He is oriented to person, place, and time. He appears well-developed and well-nourished.   HENT:   Head: Normocephalic and atraumatic.   Neck: Normal range of motion.   Pulmonary/Chest: Effort normal. No respiratory distress.   Abdominal: Soft. He exhibits no distension and no mass. There is no tenderness. No hernia.   Genitourinary: Testes normal and penis normal.         Musculoskeletal: Normal range of motion.   Lymphadenopathy:     He has no cervical adenopathy.   Neurological: He is alert and oriented to person, place, and time.   Skin: Skin is warm and dry.   Psychiatric: He has a normal mood and affect. His behavior is normal.   Vitals reviewed.      Labs Recent and today in the office:  Results for orders placed or performed in visit on 01/14/19   POC Urinalysis Dipstick, Automated   Result Value Ref Range    Color Yellow Yellow, Straw, Dark Yellow, Camelia    Clarity, UA Clear Clear    Specific Gravity  1.030 1.005 - 1.030    pH, Urine 6.0 5.0 - 8.0    Leukocytes Negative Negative    Nitrite, UA Negative Negative    Protein, POC Negative Negative mg/dL    Glucose, UA Negative Negative, 1000 mg/dL (3+) mg/dL    Ketones, UA Negative Negative    Urobilinogen, UA Normal Normal    Bilirubin Negative Negative    Blood, UA Negative Negative         Assessment & Plan  #1 erectile dysfunction:The treatment options for erectile dysfunction are then reviewed in detail with the patient.  These include oral medications including several different brands and several different dosages.  The second option to consider is intraurethral suppositories named Blossom Inserts that is basically alprostadil absorbed through the urethra.  The third option is the external erection device that can be purchased through a Rep w can see the patient here in my office and present numerous models that range from relatively inexpensive to fancy and  expensive.  The fourth option is intracorporeal injection using combination of 3 medications including phentolamine alprostadil and papaverine.  This is so-called Trimix and is more effective than the above measures but does require an injection with each episode of intercourse.  This does carry an increased risk of bleeding or ecchymosis on patient's that are using aspirin or blood thinner.  The last option which is mentioned only to be complete is surgical implantation of a penile prosthesis.  These are expensive and have lead to a unsatisfactory results in that the patient is not assured achieving orgasm and sexual partners have complained about a cold sensation.    We will  recommend the first treatment option and progress as needed according to the patient's desires.

## 2019-01-31 RX ORDER — FAMOTIDINE 20 MG/1
TABLET, FILM COATED ORAL
Qty: 60 TABLET | Refills: 0 | OUTPATIENT
Start: 2019-01-31

## 2019-03-04 RX ORDER — MODAFINIL 100 MG/1
TABLET ORAL
Qty: 30 TABLET | Refills: 0 | Status: SHIPPED | OUTPATIENT
Start: 2019-03-04 | End: 2019-03-06

## 2019-03-05 ENCOUNTER — OFFICE VISIT (OUTPATIENT)
Dept: UROLOGY | Facility: CLINIC | Age: 44
End: 2019-03-05

## 2019-03-05 VITALS
TEMPERATURE: 98.3 F | HEART RATE: 68 BPM | OXYGEN SATURATION: 98 % | SYSTOLIC BLOOD PRESSURE: 142 MMHG | DIASTOLIC BLOOD PRESSURE: 90 MMHG

## 2019-03-05 DIAGNOSIS — R79.89 LOW TESTOSTERONE: Primary | ICD-10-CM

## 2019-03-05 DIAGNOSIS — N52.9 ERECTILE DYSFUNCTION, UNSPECIFIED ERECTILE DYSFUNCTION TYPE: ICD-10-CM

## 2019-03-05 PROCEDURE — 81003 URINALYSIS AUTO W/O SCOPE: CPT | Performed by: UROLOGY

## 2019-03-05 PROCEDURE — 99213 OFFICE O/P EST LOW 20 MIN: CPT | Performed by: UROLOGY

## 2019-03-05 NOTE — PROGRESS NOTES
Chief Complaint  Low Testosterone (6 weeks)        BOO Welch is a 43 y.o. male who is today for follow-up with her primary concern of erectile dysfunction.  He does have low testosterone but very few symptoms of hypogonadism and he states his libido is much greater than his wife anyway.  Fortunately generic sildenafil was prescribed and he is seeing a little benefit from 1 and a better benefit from taking 2 tablets.  He is anxious to try 3 but is already happy with 2.  He is informed about the external erection device for even more effective treatment.    Vitals:    03/05/19 1313   BP: 142/90   Pulse: 68   Temp: 98.3 °F (36.8 °C)   SpO2: 98%       Past Medical History  Past Medical History:   Diagnosis Date   • Arthritis    • Asthma     SPOUSE REPORTS ISSUES AS A CHILD   • Bell's palsy    • Blood in stool    • Depression    • Diabetes (CMS/HCC)    • Diarrhea     WITH ABDOMINAL CRAMPING   • Diverticulitis    • Elevated cholesterol    • GERD (gastroesophageal reflux disease)    • High cholesterol    • History of being tatooed    • History of bronchitis    • History of fracture     HX OF FOOT (SPOUSE UNSURE LATERALITY)   • History of gout    • Hypertension    • Myocardial infarction (CMS/HCC)     SPOUSE REPORTS SOMEWHERE BETWEEN THE YEARS OF 0968-7843   • Sleep apnea     USES BIPAP HS - TO BRING IN THE DOS   • Wears glasses        Past Surgical History  Past Surgical History:   Procedure Laterality Date   • APPENDECTOMY  1990   • CARDIAC CATHETERIZATION     • CARDIAC CATHETERIZATION N/A 9/30/2017    Procedure: Coronary angiography;  Surgeon: Zander Bobo MD;  Location: [facility] CATH INVASIVE LOCATION;  Service:    • COLONOSCOPY N/A 6/25/2018    Procedure: COLONOSCOPY with hot snare polypectomy,  cold snare polypectomy, cold biopsy polypectomies, and biopsies;  Surgeon: Sandro Potter MD;  Location: [facility] ENDOSCOPY;  Service: Gastroenterology   • KNEE SURGERY Left     removed infection/mass in knee   • OTHER  SURGICAL HISTORY      SPOUSE REPORTS GROWTH REMOVED FROM THROAT WHEN PATIENT WAS A YOUNG CHILD   • TONSILLECTOMY         Medications  has a current medication list which includes the following prescription(s): amoxicillin, budesonide-formoterol, carvedilol, cetirizine, citalopram, glyburide, hydrochlorothiazide, ibuprofen, lisinopril, loratadine, metformin, modafinil, omega 3, sildenafil, and simvastatin.      Allergies  No Known Allergies    Social History  Social History     Social History Narrative   • Not on file       Family History  He has no family history of bladder or kidney cancer  He has no family history of kidney stones      AUA Symptom Score:      Review of Systems  Review of Systems   Constitutional: Negative.    Genitourinary: Negative.    All other systems reviewed and are negative.      Physical Exam  Physical Exam    Labs Recent and today in the office:  Results for orders placed or performed in visit on 03/05/19   POC Urinalysis Dipstick, Automated   Result Value Ref Range    Color Yellow Yellow, Straw, Dark Yellow, Camelia    Clarity, UA Clear Clear    Specific Gravity  1.030 1.005 - 1.030    pH, Urine 6.0 5.0 - 8.0    Leukocytes Negative Negative    Nitrite, UA Negative Negative    Protein, POC Negative Negative mg/dL    Glucose, UA Negative Negative, 1000 mg/dL (3+) mg/dL    Ketones, UA Negative Negative    Urobilinogen, UA Normal Normal    Bilirubin Negative Negative    Blood, UA Negative Negative         Assessment & Plan  #1 hypogonadism: He does have a slightly low testosterone but few symptoms and is not anxious for supplement after explaining the true benefits.    2.  Erectile dysfunction: He is enjoying a significant improvement on generic sildenafil and will call back for refill on his prescription or external erection device if things change.

## 2019-03-06 RX ORDER — FAMOTIDINE 20 MG/1
TABLET, FILM COATED ORAL
Qty: 60 TABLET | Refills: 0 | OUTPATIENT
Start: 2019-03-06

## 2019-03-06 RX ORDER — MODAFINIL 100 MG/1
TABLET ORAL
Qty: 30 TABLET | Refills: 0 | Status: SHIPPED | OUTPATIENT
Start: 2019-03-06 | End: 2019-05-06 | Stop reason: SDUPTHER

## 2019-04-09 RX ORDER — FAMOTIDINE 20 MG/1
TABLET, FILM COATED ORAL
Qty: 60 TABLET | Refills: 0 | OUTPATIENT
Start: 2019-04-09

## 2019-05-06 RX ORDER — MODAFINIL 100 MG/1
TABLET ORAL
Qty: 30 TABLET | Refills: 0 | Status: SHIPPED | OUTPATIENT
Start: 2019-05-06 | End: 2019-06-05 | Stop reason: SDUPTHER

## 2019-05-06 RX ORDER — FAMOTIDINE 20 MG/1
TABLET, FILM COATED ORAL
Qty: 60 TABLET | Refills: 0 | OUTPATIENT
Start: 2019-05-06

## 2019-05-07 ENCOUNTER — TRANSCRIBE ORDERS (OUTPATIENT)
Dept: GENERAL RADIOLOGY | Facility: HOSPITAL | Age: 44
End: 2019-05-07

## 2019-05-07 ENCOUNTER — HOSPITAL ENCOUNTER (OUTPATIENT)
Dept: GENERAL RADIOLOGY | Facility: HOSPITAL | Age: 44
Discharge: HOME | End: 2019-05-07
Admitting: NURSE PRACTITIONER

## 2019-05-07 DIAGNOSIS — M54.50 LUMBAR PAIN: Primary | ICD-10-CM

## 2019-05-07 DIAGNOSIS — M54.50 LUMBAR PAIN: ICD-10-CM

## 2019-05-07 PROCEDURE — 72110 X-RAY EXAM L-2 SPINE 4/>VWS: CPT

## 2019-06-06 RX ORDER — FAMOTIDINE 20 MG/1
TABLET, FILM COATED ORAL
Qty: 60 TABLET | Refills: 0 | OUTPATIENT
Start: 2019-06-06

## 2019-06-06 RX ORDER — MODAFINIL 100 MG/1
TABLET ORAL
Qty: 30 TABLET | Refills: 0 | Status: SHIPPED | OUTPATIENT
Start: 2019-06-06 | End: 2019-12-04

## 2019-07-10 RX ORDER — FAMOTIDINE 20 MG/1
TABLET, FILM COATED ORAL
Qty: 60 TABLET | Refills: 0 | OUTPATIENT
Start: 2019-07-10

## 2019-07-10 RX ORDER — MODAFINIL 100 MG/1
TABLET ORAL
Qty: 30 TABLET | Refills: 0 | OUTPATIENT
Start: 2019-07-10

## 2019-09-30 RX ORDER — FAMOTIDINE 20 MG/1
TABLET, FILM COATED ORAL
Qty: 60 TABLET | Refills: 0 | OUTPATIENT
Start: 2019-09-30

## 2019-10-29 RX ORDER — FAMOTIDINE 20 MG/1
TABLET, FILM COATED ORAL
Qty: 60 TABLET | Refills: 0 | OUTPATIENT
Start: 2019-10-29

## 2019-10-30 ENCOUNTER — OFFICE VISIT (OUTPATIENT)
Dept: ENDOCRINOLOGY | Facility: CLINIC | Age: 44
End: 2019-10-30

## 2019-10-30 VITALS
DIASTOLIC BLOOD PRESSURE: 84 MMHG | HEART RATE: 85 BPM | WEIGHT: 315 LBS | SYSTOLIC BLOOD PRESSURE: 122 MMHG | OXYGEN SATURATION: 98 % | BODY MASS INDEX: 46.86 KG/M2

## 2019-10-30 DIAGNOSIS — E11.65 UNCONTROLLED TYPE 2 DIABETES MELLITUS WITH HYPERGLYCEMIA (HCC): Primary | ICD-10-CM

## 2019-10-30 LAB
GLUCOSE BLDC GLUCOMTR-MCNC: 425 MG/DL (ref 70–130)
HBA1C MFR BLD: 11 %

## 2019-10-30 PROCEDURE — 99204 OFFICE O/P NEW MOD 45 MIN: CPT | Performed by: PHYSICIAN ASSISTANT

## 2019-10-30 PROCEDURE — 82570 ASSAY OF URINE CREATININE: CPT | Performed by: PHYSICIAN ASSISTANT

## 2019-10-30 PROCEDURE — 83036 HEMOGLOBIN GLYCOSYLATED A1C: CPT | Performed by: PHYSICIAN ASSISTANT

## 2019-10-30 PROCEDURE — 82947 ASSAY GLUCOSE BLOOD QUANT: CPT | Performed by: PHYSICIAN ASSISTANT

## 2019-10-30 PROCEDURE — 82043 UR ALBUMIN QUANTITATIVE: CPT | Performed by: PHYSICIAN ASSISTANT

## 2019-10-30 RX ORDER — INSULIN GLARGINE 100 [IU]/ML
INJECTION, SOLUTION SUBCUTANEOUS
Qty: 5 PEN | Refills: 6 | Status: SHIPPED | OUTPATIENT
Start: 2019-10-30 | End: 2020-06-10 | Stop reason: SDUPTHER

## 2019-10-30 RX ORDER — GLYBURIDE 5 MG/1
10 TABLET ORAL
Qty: 120 TABLET | Refills: 6 | Status: SHIPPED | OUTPATIENT
Start: 2019-10-30 | End: 2020-06-10 | Stop reason: SDUPTHER

## 2019-10-30 RX ORDER — FLASH GLUCOSE SCANNING READER
1 EACH MISCELLANEOUS CONTINUOUS
Qty: 1 DEVICE | Refills: 0 | Status: SHIPPED | OUTPATIENT
Start: 2019-10-30 | End: 2020-06-10

## 2019-10-30 RX ORDER — FLASH GLUCOSE SENSOR
1 KIT MISCELLANEOUS
Qty: 3 EACH | Refills: 11 | Status: SHIPPED | OUTPATIENT
Start: 2019-10-30 | End: 2020-06-10

## 2019-10-30 NOTE — PATIENT INSTRUCTIONS
Increase glyburide 5mg to 2 pills twice a day. Take this BEFORE eating.  Start Jardiance 25mg daily.  Start daily insulin basaglar. Start with 30 units at bedtime. After a week increase to 40 units.  Continue metformin 1000mg 2x/day

## 2019-10-30 NOTE — PROGRESS NOTES
"Chief Complaint  Establish care for Diabetes Mellitus.    HPI   Raul Welch is a 44 y.o. male who is here today for evaluation of Diabetes Mellitus type 2. The initial diagnosis of diabetes was made in his 20s.     Presents with wife.   High sugars causing fatigue, HAs, polyuria, polydipsia.    during visit today.   Wants a freestyle dav. Has a fear of needles.   Weight 385#, used to weigh >500#.     A1C- 11 (10/30/19), 10.3 (9/5/19)  Labs reviewed:  9/5/19- , , GFR 95, LFTs wnl, TSH 2.09, H/H wnl, B12 583    Diabetic complications: peripheral neuropathy  Eye exam current (within one year): due  Foot care and dental care: discussed    Current diabetic medications include:  Metformin 1000mg BID  Glyburide 5mg daily    Statin: zocor 20    Past medications: none    Diabetic Monitoring  - checks glucose 7x/day  Glucose is averaging- FBS mostly 300-400, during the day 300-500s  Hypoglycemia- no  Home blood sugar records: glucometer downloaded, data reviewed and scanned to chart    Nutrition:     Current diet: in general, an \"unhealthy\" diet, on average, 2 meals per day, lunch and dinner. No sugared drinks.   Current exercise: none    The following portions of the patient's history were reviewed and updated by me as appropriate: allergies, current medications, past family history, past social history, past surgical history and problem list.    Past Medical History:   Diagnosis Date   • Arthritis    • Asthma     SPOUSE REPORTS ISSUES AS A CHILD   • Bell's palsy    • Blood in stool    • Depression    • Diabetes (CMS/[facility])    • Diarrhea     WITH ABDOMINAL CRAMPING   • Diverticulitis    • Elevated cholesterol    • GERD (gastroesophageal reflux disease)    • High cholesterol    • History of being tatooed    • History of bronchitis    • History of fracture     HX OF FOOT (SPOUSE UNSURE LATERALITY)   • History of gout    • Hypertension    • Myocardial infarction (CMS/[facility])     SPOUSE REPORTS SOMEWHERE " BETWEEN THE YEARS OF 2837-3334   • Sleep apnea     USES BIPAP HS - TO BRING IN THE DOS   • Wears glasses        Medications    Current Outpatient Medications:   •  carvedilol (COREG) 12.5 MG tablet, Take 25 mg by mouth 2 (Two) Times a Day., Disp: , Rfl:   •  cetirizine (ZyrTEC) 10 MG tablet, Take 10 mg by mouth Daily., Disp: , Rfl:   •  citalopram (CeleXA) 20 MG tablet, Take 20 mg by mouth Daily., Disp: , Rfl:   •  glyburide (DIAbeta) 5 MG tablet, Take 2 tablets by mouth 2 (Two) Times a Day Before Meals., Disp: 120 tablet, Rfl: 6  •  hydrochlorothiazide (HYDRODIURIL) 25 MG tablet, Take 25 mg by mouth Daily., Disp: , Rfl:   •  ibuprofen (ADVIL,MOTRIN) 800 MG tablet, Take 1 tablet by mouth Every 8 (Eight) Hours As Needed for Mild Pain  or Moderate Pain ., Disp: 20 tablet, Rfl: 0  •  lisinopril (PRINIVIL,ZESTRIL) 40 MG tablet, Take 40 mg by mouth Daily., Disp: , Rfl:   •  metFORMIN (GLUCOPHAGE) 1000 MG tablet, Take 1,000 mg by mouth 2 (Two) Times a Day With Meals., Disp: , Rfl:   •  modafinil (PROVIGIL) 100 MG tablet, TAKE 1 TABLET BY MOUTH ONE TIME A DAY , Disp: 30 tablet, Rfl: 0  •  Omega 3 1200 MG capsule, Take 1,000 mg by mouth 2 (Two) Times a Day., Disp: , Rfl:   •  sildenafil (REVATIO) 20 MG tablet, Take 3 tablets by mouth Daily As Needed (ED)., Disp: 30 tablet, Rfl: 11  •  simvastatin (ZOCOR) 20 MG tablet, Take 20 mg by mouth Every Night., Disp: , Rfl:   •  Continuous Blood Gluc  (FREESTYLE EDUARDO 14 DAY READER) device, 1 each Continuous., Disp: 1 Device, Rfl: 0  •  Continuous Blood Gluc Sensor (FREESTYLE EDUARDO 14 DAY SENSOR) mis, 1 each Every 14 (Fourteen) Days., Disp: 3 each, Rfl: 11  •  Empagliflozin (JARDIANCE) 25 MG tablet, Take 25 mg by mouth Daily., Disp: 30 tablet, Rfl: 6  •  Insulin Glargine (BASAGLAR KWIKPEN) 100 UNIT/ML injection pen, Start with 30 units of insulin at bedtime, after a week increase to 40 units, Disp: 5 pen, Rfl: 6  •  Insulin Pen Needle 32G X 6 MM misc, Use daily with insulin,  Disp: 100 each, Rfl: 11    Review of Systems  Review of Systems   Constitutional: Positive for appetite change (decreased) and fatigue. Negative for chills, fever and unexpected weight change.        Feeling poorly   HENT: Negative for ear discharge, ear pain, hearing loss, nosebleeds, rhinorrhea and sore throat.    Eyes: Positive for visual disturbance. Negative for pain and redness.   Respiratory: Negative for cough, shortness of breath and wheezing.    Cardiovascular: Negative for chest pain, palpitations and leg swelling.   Gastrointestinal: Positive for diarrhea and nausea. Negative for abdominal pain, blood in stool, constipation and vomiting.   Endocrine: Negative for cold intolerance, heat intolerance and polydipsia.   Genitourinary: Negative for difficulty urinating, discharge, dysuria, hematuria, penile pain, penile swelling, scrotal swelling, testicular pain and urgency.   Musculoskeletal: Positive for arthralgias and myalgias. Negative for gait problem and joint swelling.   Skin: Negative for rash.   Allergic/Immunologic: Negative.    Neurological: Positive for headaches. Negative for dizziness, syncope, weakness and numbness.   Hematological: Negative for adenopathy. Does not bruise/bleed easily.   Psychiatric/Behavioral: Positive for sleep disturbance. Negative for suicidal ideas. The patient is not nervous/anxious.         Depressed        Physical Exam    /84   Pulse 85   Wt (!) 175 kg (385 lb)   SpO2 98%   BMI 46.86 kg/m² Body mass index is 46.86 kg/m².  Physical Exam   Constitutional: He is oriented to person, place, and time. He appears well-developed. No distress.   HENT:   Head: Normocephalic.   Right Ear: External ear normal.   Left Ear: External ear normal.   Nose: Nose normal.   Eyes: Conjunctivae are normal. Right eye exhibits no discharge. Left eye exhibits no discharge. No scleral icterus.   Neck: Neck supple. No JVD present. No tracheal deviation present. No thyromegaly present.    Cardiovascular: Normal rate, regular rhythm, normal heart sounds and intact distal pulses.   No murmur heard.  Pulmonary/Chest: Effort normal and breath sounds normal. No respiratory distress. He has no wheezes.   Abdominal: Soft. Bowel sounds are normal. There is no tenderness.   Musculoskeletal: He exhibits no edema or tenderness.    Raul had a diabetic foot exam performed today.   During the foot exam he had a monofilament test performed.    Neurological Sensory Findings -  Unaltered sharp/dull right ankle/foot discrimination and unaltered sharp/dull left ankle/foot discrimination.  Vascular Status -  His right foot exhibits normal foot vasculature  and no edema. His left foot exhibits normal foot vasculature  and no edema.  Skin Integrity  -  His right foot skin is intact. He has right foot onychomycosis.  He has no right foot ulcer and non-callous right foot.His left foot skin is intact.He has left foot onychomycosis. He has no left foot ulcer and non-callous left foot..  Neurological: He is alert and oriented to person, place, and time.   Skin: Skin is warm and dry. No rash noted. He is not diaphoretic. No erythema.   Psychiatric: He has a normal mood and affect. His behavior is normal. Judgment and thought content normal.       Labs and Imaging   Lab Results   Component Value Date    HGBA1C 11.0 10/30/2019    HGBA1C 8.10 (H) 11/01/2017    HGBA1C 7.6 (H) 09/30/2017     Office Visit on 10/30/2019   Component Date Value Ref Range Status   • Glucose 10/30/2019 425* 70 - 130 mg/dL Final   • Hemoglobin A1C 10/30/2019 11.0  % Final     No images are attached to the encounter or orders placed in the encounter.  No Images in the past 120 days found..    Assessment / Plan   Raul was seen today for [facility] care.    Diagnoses and all orders for this visit:    Uncontrolled type 2 diabetes mellitus with hyperglycemia (CMS/[facility])  -     POC Glucose Fingerstick  -     POC Glycosylated Hemoglobin (Hb A1C)  -      Continuous Blood Gluc Sensor (FREESTYLE EDUARDO 14 DAY SENSOR) misc; 1 each Every 14 (Fourteen) Days.  -     Continuous Blood Gluc  (FREESTYLE EDUARDO 14 DAY READER) device; 1 each Continuous.  -     glyburide (DIAbeta) 5 MG tablet; Take 2 tablets by mouth 2 (Two) Times a Day Before Meals.  -     Insulin Glargine (BASAGLAR KWIKPEN) 100 UNIT/ML injection pen; Start with 30 units of insulin at bedtime, after a week increase to 40 units  -     Insulin Pen Needle 32G X 6 MM misc; Use daily with insulin  -     Empagliflozin (JARDIANCE) 25 MG tablet; Take 25 mg by mouth Daily.  -     Microalbumin / Creatinine Urine Ratio - Urine, Clean Catch        Diabetes Mellitus 2 is under poor control.  -A1c 11  -bs log reviewed, readings mostly 300-500, no hypoglycemia   -pt has a fear of needles- send rx for freestyle eduardo  -discussed with pt sugar is very high and need to start at least basal insulin   -start basaglar 30u qhs, after a week increase to 40u. Samples provided. Administration reviewed. Wife will administer this to him.   -start jardiance 25mg daily. Samples provided. Discussed importance of adequate hydration and good hygiene with this medication.   -increase glyburide 5mg to 2 tab BID with emphasis on taking AC to avoid hypoglycemia  -cont metformin 1000mg BID  -wife will call with bs readings in 4 weeks for insulin adjustments  -written instructions reviewed with pt and wife. Wife also wrote down and read back instructions  -discussed diet and exercise, continued wt loss    1.  Diet: 3-4 carb servings per meal for females, 4-5 carb servings per meal for males  Spread carb intake throughout the day  Increase lean protein and vegetable intake  Avoid sugary drinks and processed carbs including crackers, cookies, cakes  2.  Exercise: Recommend at least 30 minutes of exercise daily, at least 5 days per week. Increase exercise gradually.   3.  Blood Glucose Goal: Blood glucose goal <150 fasting, <180 2 hr  postprandial  4.  Microalbumin due  5.  Education performed during this visit: long term diabetic complications discussed. , annual eye examinations at Ophthalmology discussed, dental hygiene discussed  and foot care reviewed., home glucose monitoring emphasized, all medications, side effects and compliance discussed carefully and Hypoglycemia management and prevention reviewed. Reviewed ‘ABCs’ of diabetes management (respective goals in parentheses):  A1C (<7), blood pressure (<130/80), and cholesterol (LDL <100, if CVD <70).    Patient Instructions   Increase glyburide 5mg to 2 pills twice a day. Take this BEFORE eating.  Start Jardiance 25mg daily.  Start daily insulin basaglar. Start with 30 units at bedtime. After a week increase to 40 units.  Continue metformin 1000mg 2x/day      Follow up: Return in about 3 months (around 1/30/2020).    Discussed the nature of the disease including, risks, complications, implications, management, safe and proper use of medications. Encouraged therapeutic lifestyle changes including low calorie diet with plenty of fruits and vegetables, daily exercise, medication compliance, and keeping scheduled follow up appointments with me and any other providers. Encouraged patient to have appointment for complete physical, fasting labs, appropriate screenings, and immunizations on an annual basis.    25 min  of 45 min face-to-face visit time spent for coordination of care and counselling regarding identified problems as outlined in the objective, assessment and discussion portions of the documentation.    Jo-Ann Clark PA-C

## 2019-10-31 LAB
ALBUMIN UR-MCNC: <1.2 MG/DL
CREAT UR-MCNC: 31.9 MG/DL
MICROALBUMIN/CREAT UR: NORMAL MG/G{CREAT}

## 2019-11-13 ENCOUNTER — DOCUMENTATION (OUTPATIENT)
Dept: ENDOCRINOLOGY | Facility: CLINIC | Age: 44
End: 2019-11-13

## 2019-11-25 ENCOUNTER — OFFICE VISIT (OUTPATIENT)
Dept: UROLOGY | Facility: CLINIC | Age: 44
End: 2019-11-25

## 2019-11-25 VITALS — WEIGHT: 315 LBS | HEIGHT: 76 IN | BODY MASS INDEX: 38.36 KG/M2

## 2019-11-25 DIAGNOSIS — R39.198 DIFFICULTY IN URINATION: Primary | ICD-10-CM

## 2019-11-25 DIAGNOSIS — B37.49 CANDIDAL BALANOPOSTHITIS: ICD-10-CM

## 2019-11-25 DIAGNOSIS — N48.89 PENILE IRRITATION: ICD-10-CM

## 2019-11-25 LAB
BILIRUB BLD-MCNC: NEGATIVE MG/DL
CLARITY, POC: CLEAR
COLOR UR: YELLOW
GLUCOSE UR STRIP-MCNC: ABNORMAL MG/DL
KETONES UR QL: NEGATIVE
LEUKOCYTE EST, POC: NEGATIVE
NITRITE UR-MCNC: NEGATIVE MG/ML
PH UR: 6 [PH] (ref 5–8)
PROT UR STRIP-MCNC: NEGATIVE MG/DL
RBC # UR STRIP: ABNORMAL /UL
SP GR UR: 1.01 (ref 1–1.03)
UROBILINOGEN UR QL: NORMAL

## 2019-11-25 PROCEDURE — 99214 OFFICE O/P EST MOD 30 MIN: CPT | Performed by: UROLOGY

## 2019-11-25 RX ORDER — NYSTATIN AND TRIAMCINOLONE ACETONIDE 100000; 1 [USP'U]/G; MG/G
OINTMENT TOPICAL
Qty: 30 G | Refills: 1 | Status: SHIPPED | OUTPATIENT
Start: 2019-11-25

## 2019-11-25 RX ORDER — FLURBIPROFEN SODIUM 0.3 MG/ML
SOLUTION/ DROPS OPHTHALMIC
Refills: 11 | COMMUNITY
Start: 2019-11-04

## 2019-11-25 NOTE — PROGRESS NOTES
Chief Complaint  Problems with urination/Irritation on Penis        BOO Welch is a 44 y.o. male who returns today for urgent evaluation of his penis stating he was placed on Jardiance for his diabetes and with severe glucosuria he developed inflammation of his penis and foreskin.  He is morbidly obese in the head of his penis is covered with soft tissue.  He has been applying antibiotic ointment which is could be an effective and could cause contact dermatitis.  He has changed his diabetic medication to a new one but he does not remember the name.  He also has some discomfort in the right testicle that he states has been present for 20 years.    There were no vitals filed for this visit.    Past Medical History  Past Medical History:   Diagnosis Date   • Arthritis    • Asthma     SPOUSE REPORTS ISSUES AS A CHILD   • Bell's palsy    • Blood in stool    • Depression    • Diabetes (CMS/HCC)    • Diarrhea     WITH ABDOMINAL CRAMPING   • Diverticulitis    • Elevated cholesterol    • GERD (gastroesophageal reflux disease)    • High cholesterol    • History of being tatooed    • History of bronchitis    • History of fracture     HX OF FOOT (SPOUSE UNSURE LATERALITY)   • History of gout    • Hypertension    • Myocardial infarction (CMS/HCC)     SPOUSE REPORTS SOMEWHERE BETWEEN THE YEARS OF [id number]   • Sleep apnea     USES BIPAP HS - TO BRING IN THE DOS   • Wears glasses        Past Surgical History  Past Surgical History:   Procedure Laterality Date   • APPENDECTOMY  1990   • CARDIAC CATHETERIZATION     • CARDIAC CATHETERIZATION N/A 9/30/2017    Procedure: Coronary angiography;  Surgeon: Zander Bobo MD;  Location: [facility] CATH INVASIVE LOCATION;  Service:    • COLONOSCOPY N/A 6/25/2018    Procedure: COLONOSCOPY with hot snare polypectomy,  cold snare polypectomy, cold biopsy polypectomies, and biopsies;  Surgeon: Sandro Potter MD;  Location: [facility] ENDOSCOPY;  Service: Gastroenterology   • KNEE SURGERY Left      removed infection/mass in knee   • OTHER SURGICAL HISTORY      SPOUSE REPORTS GROWTH REMOVED FROM THROAT WHEN PATIENT WAS A YOUNG CHILD   • TONSILLECTOMY         Medications  has a current medication list which includes the following prescription(s): b-d uf iii mini pen needles, carvedilol, cetirizine, citalopram, freestyle dva 14 day reader, freestyle dav 14 day sensor, ertugliflozin l-pyroglutamicac, glyburide, hydrochlorothiazide, ibuprofen, insulin glargine, insulin pen needle, lisinopril, metformin, modafinil, omega 3, sildenafil, and simvastatin.      Allergies  No Known Allergies    Social History  Social History     Socioeconomic History   • Marital status:      Spouse name: Not on file   • Number of children: Not on file   • Years of education: Not on file   • Highest education level: Not on file   Tobacco Use   • Smoking status: Former Smoker     Packs/day: 0.25     Years: 0.50     Pack years: 0.12     Types: Cigarettes   • Smokeless tobacco: Never Used   • Tobacco comment: SPOUSE REPORTS PATIENT SMOKED VERY BRIEFLY WITH NO ADDICTIVE HABIT AS A TEENAGER. QUIT OVER 25 YEARS AGO.   Substance and Sexual Activity   • Alcohol use: No   • Drug use: No   • Sexual activity: Defer       Family History  He has no family history of bladder or kidney cancer  He has no family history of kidney stones      AUA Symptom Score:      Review of Systems  Review of Systems   Constitutional: Negative for activity change, appetite change, chills, fatigue, fever, unexpected weight gain and unexpected weight loss.   Respiratory: Negative for apnea, cough, chest tightness, shortness of breath, wheezing and stridor.    Cardiovascular: Negative for chest pain, palpitations and leg swelling.   Gastrointestinal: Negative for abdominal distention, abdominal pain, anal bleeding, blood in stool, constipation, diarrhea, nausea, rectal pain, vomiting, GERD and indigestion.   Genitourinary: Positive for discharge, dysuria,  frequency, penile pain, penile swelling, testicular pain and urgency. Negative for decreased libido, decreased urine volume, difficulty urinating, flank pain, genital sores, hematuria, nocturia, erectile dysfunction, scrotal swelling and urinary incontinence.   Musculoskeletal: Negative for back pain and joint swelling.   Neurological: Negative for tremors, seizures, speech difficulty, weakness and numbness.   Psychiatric/Behavioral: Negative for agitation, decreased concentration, sleep disturbance, depressed mood and stress. The patient is not nervous/anxious.        Physical Exam  Physical Exam   Constitutional:           Labs Recent and today in the office:  Results for orders placed or performed in visit on 11/25/19   POC Urinalysis Dipstick, Automated   Result Value Ref Range    Color Yellow Yellow, Straw, Dark Yellow, Camelia    Clarity, UA Clear Clear    Specific Gravity  1.010 1.005 - 1.030    pH, Urine 6.0 5.0 - 8.0    Leukocytes Negative Negative    Nitrite, UA Negative Negative    Protein, POC Negative Negative mg/dL    Glucose, UA 3+ (A) Negative, 1000 mg/dL (3+) mg/dL    Ketones, UA Negative Negative    Urobilinogen, UA Normal Normal    Bilirubin Negative Negative    Blood, UA Trace (A) Negative         Assessment & Plan  Balanoposthitis: Patient has previously had a circumcision in his glands is inside the skin several centimeters.  He has active erosion of the foreskin consistent with active inflammation so he is instructed to discontinue the antibiotic ointment and apply Mycolog cream.  He still has 3+ glucose and needs to get his diabetes under better control and lose some weight.

## 2019-12-02 ENCOUNTER — OFFICE VISIT (OUTPATIENT)
Dept: NEUROLOGY | Facility: CLINIC | Age: 44
End: 2019-12-02

## 2019-12-02 VITALS
BODY MASS INDEX: 46.86 KG/M2 | SYSTOLIC BLOOD PRESSURE: 110 MMHG | OXYGEN SATURATION: 98 % | DIASTOLIC BLOOD PRESSURE: 70 MMHG | HEART RATE: 76 BPM | TEMPERATURE: 96.5 F | WEIGHT: 315 LBS

## 2019-12-02 DIAGNOSIS — G47.33 OSA TREATED WITH BIPAP: Primary | ICD-10-CM

## 2019-12-02 PROCEDURE — 99213 OFFICE O/P EST LOW 20 MIN: CPT | Performed by: NURSE PRACTITIONER

## 2019-12-02 RX ORDER — FAMOTIDINE 20 MG/1
TABLET, FILM COATED ORAL
Qty: 60 TABLET | Refills: 0 | OUTPATIENT
Start: 2019-12-02

## 2019-12-04 RX ORDER — MODAFINIL 100 MG/1
TABLET ORAL
Qty: 30 TABLET | Refills: 0 | Status: SHIPPED | OUTPATIENT
Start: 2019-12-04 | End: 2022-03-08

## 2019-12-09 ENCOUNTER — TELEPHONE (OUTPATIENT)
Dept: INTERNAL MEDICINE | Facility: CLINIC | Age: 44
End: 2019-12-09

## 2019-12-09 NOTE — TELEPHONE ENCOUNTER
10/30 -   Pm - 384  10/31 - am 450  Pm - 312  11/1 am 224  Pm 446  11/2 am 217  Pm ----  11/3 am 405  Pm ----  11/4 am 349  Pm 396  11/5 am fasting 266  Pm 234  11/6 am fasting 204  Pm 345  11/7 am ---  Pm 223  11/8 am 269  Pm 320  11/9 am --  Pm---  11/10 am 324  Pm 396  11/11 am -=---  Pm ---  11/12 am ---  Pm 322  11/13 am fasting 242  Pm 356  11/14 am ---  Pm 383  11/15 am ---  Pm 360  11/16 am --\  Pm 319  11/17 am 400  Pm 435  11/18 am ---  Pm 400  11/19 am fasting 394  Pm 352  11/20 am fasting 278  Pm ---  11/25 am fasting 316  Pm 256  11/29 am --  Pm 271  11/30 am 290  Pm ---  12/1 am --  Pm 219  12/4 am ---  Pm 256  12/5 am --  Pm 138  12/6 am --  Pm 342 (ate junk food prior)  12/7   [pm 255  12/8 am ---  Pm 214 , 198

## 2019-12-10 NOTE — TELEPHONE ENCOUNTER
Left a message on his cell phone asking him to return my call regarding dose changes, I called his home phone yesterday and today but it doesn't ring its just dead air.

## 2019-12-11 ENCOUNTER — TELEPHONE (OUTPATIENT)
Dept: INTERNAL MEDICINE | Facility: CLINIC | Age: 44
End: 2019-12-11

## 2019-12-11 NOTE — TELEPHONE ENCOUNTER
PT WAS INFORMED HOW TO INCREASE BASAGLAR AND THAT A LETTER WAS SENT WITH THESE INSTRUCTIONS AS WELL

## 2019-12-30 RX ORDER — FAMOTIDINE 20 MG/1
TABLET, FILM COATED ORAL
Qty: 60 TABLET | Refills: 0 | OUTPATIENT
Start: 2019-12-30

## 2020-01-27 RX ORDER — FAMOTIDINE 20 MG/1
TABLET, FILM COATED ORAL
Qty: 60 TABLET | Refills: 0 | OUTPATIENT
Start: 2020-01-27

## 2020-03-27 ENCOUNTER — HOSPITAL ENCOUNTER (EMERGENCY)
Facility: HOSPITAL | Age: 45
Discharge: HOME | End: 2020-03-27
Attending: STUDENT IN AN ORGANIZED HEALTH CARE EDUCATION/TRAINING PROGRAM | Admitting: STUDENT IN AN ORGANIZED HEALTH CARE EDUCATION/TRAINING PROGRAM

## 2020-03-27 ENCOUNTER — APPOINTMENT (OUTPATIENT)
Dept: GENERAL RADIOLOGY | Facility: HOSPITAL | Age: 45
End: 2020-03-27

## 2020-03-27 VITALS
RESPIRATION RATE: 16 BRPM | OXYGEN SATURATION: 96 % | SYSTOLIC BLOOD PRESSURE: 125 MMHG | DIASTOLIC BLOOD PRESSURE: 89 MMHG | TEMPERATURE: 98.3 F | HEART RATE: 63 BPM | HEIGHT: 76 IN | BODY MASS INDEX: 38.36 KG/M2 | WEIGHT: 315 LBS

## 2020-03-27 DIAGNOSIS — R06.02 SHORTNESS OF BREATH: ICD-10-CM

## 2020-03-27 DIAGNOSIS — R05.9 COUGH: Primary | ICD-10-CM

## 2020-03-27 DIAGNOSIS — R19.7 DIARRHEA, UNSPECIFIED TYPE: ICD-10-CM

## 2020-03-27 LAB
ALBUMIN SERPL-MCNC: 4.2 G/DL (ref 3.5–5.2)
ALBUMIN/GLOB SERPL: 1.2 G/DL
ALP SERPL-CCNC: 90 U/L (ref 39–117)
ALT SERPL W P-5'-P-CCNC: 34 U/L (ref 1–41)
ANION GAP SERPL CALCULATED.3IONS-SCNC: 15.1 MMOL/L (ref 5–15)
AST SERPL-CCNC: 41 U/L (ref 1–40)
BASOPHILS # BLD AUTO: 0.06 10*3/MM3 (ref 0–0.2)
BASOPHILS NFR BLD AUTO: 0.8 % (ref 0–1.5)
BILIRUB SERPL-MCNC: 0.7 MG/DL (ref 0.2–1.2)
BUN BLD-MCNC: 25 MG/DL (ref 6–20)
BUN/CREAT SERPL: 25.5 (ref 7–25)
CALCIUM SPEC-SCNC: 9.6 MG/DL (ref 8.6–10.5)
CHLORIDE SERPL-SCNC: 99 MMOL/L (ref 98–107)
CO2 SERPL-SCNC: 21.9 MMOL/L (ref 22–29)
CREAT BLD-MCNC: 0.98 MG/DL (ref 0.76–1.27)
DEPRECATED RDW RBC AUTO: 43.9 FL (ref 37–54)
EOSINOPHIL # BLD AUTO: 0.47 10*3/MM3 (ref 0–0.4)
EOSINOPHIL NFR BLD AUTO: 6 % (ref 0.3–6.2)
ERYTHROCYTE [DISTWIDTH] IN BLOOD BY AUTOMATED COUNT: 13.8 % (ref 12.3–15.4)
GFR SERPL CREATININE-BSD FRML MDRD: 82 ML/MIN/1.73
GLOBULIN UR ELPH-MCNC: 3.6 GM/DL
GLUCOSE BLD-MCNC: 168 MG/DL (ref 65–99)
HCT VFR BLD AUTO: 48.4 % (ref 37.5–51)
HGB BLD-MCNC: 16.2 G/DL (ref 13–17.7)
IMM GRANULOCYTES # BLD AUTO: 0.05 10*3/MM3 (ref 0–0.05)
IMM GRANULOCYTES NFR BLD AUTO: 0.6 % (ref 0–0.5)
LIPASE SERPL-CCNC: 32 U/L (ref 13–60)
LYMPHOCYTES # BLD AUTO: 2.21 10*3/MM3 (ref 0.7–3.1)
LYMPHOCYTES NFR BLD AUTO: 28 % (ref 19.6–45.3)
MCH RBC QN AUTO: 29.1 PG (ref 26.6–33)
MCHC RBC AUTO-ENTMCNC: 33.5 G/DL (ref 31.5–35.7)
MCV RBC AUTO: 86.9 FL (ref 79–97)
MONOCYTES # BLD AUTO: 0.9 10*3/MM3 (ref 0.1–0.9)
MONOCYTES NFR BLD AUTO: 11.4 % (ref 5–12)
NEUTROPHILS # BLD AUTO: 4.2 10*3/MM3 (ref 1.7–7)
NEUTROPHILS NFR BLD AUTO: 53.2 % (ref 42.7–76)
NRBC BLD AUTO-RTO: 0 /100 WBC (ref 0–0.2)
NT-PROBNP SERPL-MCNC: 18.9 PG/ML (ref 5–450)
PLATELET # BLD AUTO: 209 10*3/MM3 (ref 140–450)
PMV BLD AUTO: 10.8 FL (ref 6–12)
POTASSIUM BLD-SCNC: 4.1 MMOL/L (ref 3.5–5.2)
PROT SERPL-MCNC: 7.8 G/DL (ref 6–8.5)
RBC # BLD AUTO: 5.57 10*6/MM3 (ref 4.14–5.8)
SODIUM BLD-SCNC: 136 MMOL/L (ref 136–145)
TROPONIN T SERPL-MCNC: <0.01 NG/ML (ref 0–0.03)
WBC NRBC COR # BLD: 7.89 10*3/MM3 (ref 3.4–10.8)

## 2020-03-27 PROCEDURE — 71045 X-RAY EXAM CHEST 1 VIEW: CPT

## 2020-03-27 PROCEDURE — 87804 INFLUENZA ASSAY W/OPTIC: CPT | Performed by: PHYSICIAN ASSISTANT

## 2020-03-27 PROCEDURE — U0003 INFECTIOUS AGENT DETECTION BY NUCLEIC ACID (DNA OR RNA); SEVERE ACUTE RESPIRATORY SYNDROME CORONAVIRUS 2 (SARS-COV-2) (CORONAVIRUS DISEASE [COVID-19]), AMPLIFIED PROBE TECHNIQUE, MAKING USE OF HIGH THROUGHPUT TECHNOLOGIES AS DESCRIBED BY CMS-2020-01-R: HCPCS | Performed by: PHYSICIAN ASSISTANT

## 2020-03-27 PROCEDURE — 99284 EMERGENCY DEPT VISIT MOD MDM: CPT

## 2020-03-27 PROCEDURE — 83880 ASSAY OF NATRIURETIC PEPTIDE: CPT | Performed by: PHYSICIAN ASSISTANT

## 2020-03-27 PROCEDURE — 85025 COMPLETE CBC W/AUTO DIFF WBC: CPT | Performed by: PHYSICIAN ASSISTANT

## 2020-03-27 PROCEDURE — 83690 ASSAY OF LIPASE: CPT | Performed by: PHYSICIAN ASSISTANT

## 2020-03-27 PROCEDURE — 25010000002 ONDANSETRON PER 1 MG: Performed by: PHYSICIAN ASSISTANT

## 2020-03-27 PROCEDURE — 87635 SARS-COV-2 COVID-19 AMP PRB: CPT | Performed by: PHYSICIAN ASSISTANT

## 2020-03-27 PROCEDURE — 80053 COMPREHEN METABOLIC PANEL: CPT | Performed by: PHYSICIAN ASSISTANT

## 2020-03-27 PROCEDURE — 93005 ELECTROCARDIOGRAM TRACING: CPT | Performed by: STUDENT IN AN ORGANIZED HEALTH CARE EDUCATION/TRAINING PROGRAM

## 2020-03-27 PROCEDURE — 96374 THER/PROPH/DIAG INJ IV PUSH: CPT

## 2020-03-27 PROCEDURE — 84484 ASSAY OF TROPONIN QUANT: CPT | Performed by: PHYSICIAN ASSISTANT

## 2020-03-27 RX ORDER — AZITHROMYCIN 250 MG/1
TABLET, FILM COATED ORAL
Qty: 6 TABLET | Refills: 0 | Status: SHIPPED | OUTPATIENT
Start: 2020-03-27 | End: 2020-06-10

## 2020-03-27 RX ORDER — ONDANSETRON 2 MG/ML
4 INJECTION INTRAMUSCULAR; INTRAVENOUS ONCE
Status: COMPLETED | OUTPATIENT
Start: 2020-03-27 | End: 2020-03-27

## 2020-03-27 RX ORDER — SODIUM CHLORIDE 0.9 % (FLUSH) 0.9 %
10 SYRINGE (ML) INJECTION AS NEEDED
Status: DISCONTINUED | OUTPATIENT
Start: 2020-03-27 | End: 2020-03-27 | Stop reason: HOSPADM

## 2020-03-27 RX ORDER — CEFUROXIME AXETIL 500 MG/1
500 TABLET ORAL 2 TIMES DAILY
Qty: 10 TABLET | Refills: 0 | Status: SHIPPED | OUTPATIENT
Start: 2020-03-27 | End: 2020-04-01

## 2020-03-27 RX ADMIN — SODIUM CHLORIDE 1000 ML: 9 INJECTION, SOLUTION INTRAVENOUS at 10:13

## 2020-03-27 RX ADMIN — ONDANSETRON 4 MG: 2 INJECTION INTRAMUSCULAR; INTRAVENOUS at 10:10

## 2020-03-28 LAB
FLUAV AG NPH QL: NEGATIVE
FLUBV AG NPH QL IA: NEGATIVE

## 2020-03-30 ENCOUNTER — TELEPHONE (OUTPATIENT)
Dept: PREADMISSION TESTING | Facility: HOSPITAL | Age: 45
End: 2020-03-30

## 2020-03-30 RX ORDER — FAMOTIDINE 20 MG/1
TABLET, FILM COATED ORAL
Qty: 60 TABLET | Refills: 0 | OUTPATIENT
Start: 2020-03-30

## 2020-03-31 ENCOUNTER — TELEPHONE (OUTPATIENT)
Dept: PREADMISSION TESTING | Facility: HOSPITAL | Age: 45
End: 2020-03-31

## 2020-03-31 NOTE — TELEPHONE ENCOUNTER
Spoke with pt of test results done in the ER are still pending at this time. Will continue to update pt.

## 2020-04-02 ENCOUNTER — TELEPHONE (OUTPATIENT)
Dept: PREADMISSION TESTING | Facility: HOSPITAL | Age: 45
End: 2020-04-02

## 2020-04-02 NOTE — TELEPHONE ENCOUNTER
Spoke with pt of covid test from 3/27/20 still pending. Will continue to update pt with results when possible.

## 2020-04-03 ENCOUNTER — TELEPHONE (OUTPATIENT)
Dept: PREADMISSION TESTING | Facility: HOSPITAL | Age: 45
End: 2020-04-03

## 2020-04-05 LAB — SARS-COV-2 RNA RESP QL NAA+PROBE: NOT DETECTED

## 2020-04-06 ENCOUNTER — TELEPHONE (OUTPATIENT)
Dept: PREADMISSION TESTING | Facility: HOSPITAL | Age: 45
End: 2020-04-06

## 2020-04-06 NOTE — TELEPHONE ENCOUNTER
"Returned pt's call.  Informed him at recent COVID19 test done in the E.R was negative at that time.  Pt states that he \"feels fine\" now.  Phone number for medical records given.    "

## 2020-04-27 RX ORDER — FAMOTIDINE 20 MG/1
TABLET, FILM COATED ORAL
Qty: 60 TABLET | Refills: 0 | OUTPATIENT
Start: 2020-04-27

## 2020-05-26 RX ORDER — FAMOTIDINE 20 MG/1
TABLET, FILM COATED ORAL
Qty: 60 TABLET | Refills: 0 | OUTPATIENT
Start: 2020-05-26

## 2020-06-10 ENCOUNTER — OFFICE VISIT (OUTPATIENT)
Dept: ENDOCRINOLOGY | Facility: CLINIC | Age: 45
End: 2020-06-10

## 2020-06-10 ENCOUNTER — LAB (OUTPATIENT)
Dept: LAB | Facility: HOSPITAL | Age: 45
End: 2020-06-10

## 2020-06-10 VITALS
BODY MASS INDEX: 49.13 KG/M2 | SYSTOLIC BLOOD PRESSURE: 124 MMHG | HEART RATE: 81 BPM | OXYGEN SATURATION: 99 % | WEIGHT: 315 LBS | DIASTOLIC BLOOD PRESSURE: 80 MMHG

## 2020-06-10 DIAGNOSIS — E11.65 UNCONTROLLED TYPE 2 DIABETES MELLITUS WITH HYPERGLYCEMIA (HCC): Primary | ICD-10-CM

## 2020-06-10 LAB
ALBUMIN UR-MCNC: <1.2 MG/DL
CREAT UR-MCNC: 25.2 MG/DL
GLUCOSE BLDC GLUCOMTR-MCNC: 310 MG/DL (ref 70–130)
HBA1C MFR BLD: 9 %
MICROALBUMIN/CREAT UR: NORMAL MG/G{CREAT}
TSH SERPL DL<=0.05 MIU/L-ACNC: 0.94 UIU/ML (ref 0.27–4.2)

## 2020-06-10 PROCEDURE — 80053 COMPREHEN METABOLIC PANEL: CPT | Performed by: PHYSICIAN ASSISTANT

## 2020-06-10 PROCEDURE — 80061 LIPID PANEL: CPT | Performed by: PHYSICIAN ASSISTANT

## 2020-06-10 PROCEDURE — 84443 ASSAY THYROID STIM HORMONE: CPT | Performed by: PHYSICIAN ASSISTANT

## 2020-06-10 PROCEDURE — 83721 ASSAY OF BLOOD LIPOPROTEIN: CPT | Performed by: PHYSICIAN ASSISTANT

## 2020-06-10 PROCEDURE — 82043 UR ALBUMIN QUANTITATIVE: CPT | Performed by: PHYSICIAN ASSISTANT

## 2020-06-10 PROCEDURE — 82947 ASSAY GLUCOSE BLOOD QUANT: CPT | Performed by: PHYSICIAN ASSISTANT

## 2020-06-10 PROCEDURE — 99214 OFFICE O/P EST MOD 30 MIN: CPT | Performed by: PHYSICIAN ASSISTANT

## 2020-06-10 PROCEDURE — 82570 ASSAY OF URINE CREATININE: CPT | Performed by: PHYSICIAN ASSISTANT

## 2020-06-10 PROCEDURE — 83036 HEMOGLOBIN GLYCOSYLATED A1C: CPT | Performed by: PHYSICIAN ASSISTANT

## 2020-06-10 RX ORDER — GLYBURIDE 5 MG/1
10 TABLET ORAL
Qty: 120 TABLET | Refills: 6 | Status: SHIPPED | OUTPATIENT
Start: 2020-06-10 | End: 2020-09-21 | Stop reason: SDUPTHER

## 2020-06-10 RX ORDER — PEN NEEDLE, DIABETIC 29 G X1/2"
NEEDLE, DISPOSABLE MISCELLANEOUS
Qty: 100 EACH | Refills: 11 | Status: SHIPPED | OUTPATIENT
Start: 2020-06-10 | End: 2022-11-01

## 2020-06-10 RX ORDER — CARVEDILOL 25 MG/1
25 TABLET ORAL 2 TIMES DAILY
COMMUNITY
Start: 2020-03-27 | End: 2022-11-01

## 2020-06-10 RX ORDER — INSULIN GLARGINE 100 [IU]/ML
INJECTION, SOLUTION SUBCUTANEOUS
Qty: 8 PEN | Refills: 3 | Status: SHIPPED | OUTPATIENT
Start: 2020-06-10 | End: 2020-09-21 | Stop reason: SDUPTHER

## 2020-06-10 NOTE — PATIENT INSTRUCTIONS
Start Ozempic 0.25mg weekly for 4 weeks then increase to 0.5mg weekly.  Increase Basaglar by 5 units every 3 days up to 80 units daily.  Continue metformin, steglatro, glyburide

## 2020-06-10 NOTE — PROGRESS NOTES
"Chief Complaint  F/u for Diabetes Mellitus.    HPI   Raul Welch is a 44 y.o. male who is here today for f/u of Diabetes Mellitus type 2. The initial diagnosis of diabetes was made in his 20s.     Lost his job June 2019. Has gained weight since then.  Then gained even more weight during covid19 pandemic    A1C- 9 (6/10/2020)< 11 (10/30/19), 10.3 (9/5/19)    Diabetic complications: peripheral neuropathy  Eye exam current (within one year): due  Foot care and dental care: discussed    Current diabetic medications include:  Metformin 1000mg BID  Glyburide 5mg 2 tab BID  Steglatro 15mg daily  Basaglar 60u qhs    Statin: zocor 20    Past medications: none    Diabetic Monitoring  -   No meter or log for review. He repots fbs mid 200s. Goes up to 300s in the evening. No hypoglycemia    Freestyle Jose was expensive.     Nutrition:     Current diet: in general, an \"unhealthy\" diet, on average, 2 meals per day, lunch and dinner. No sugared drinks.   Current exercise: none    The following portions of the patient's history were reviewed and updated by me as appropriate: allergies, current medications, past family history, past social history, past surgical history and problem list.        Past Medical History:   Diagnosis Date   • Arthritis    • Asthma     SPOUSE REPORTS ISSUES AS A CHILD   • Bell's palsy    • Blood in stool    • Depression    • Diabetes (CMS/HCC)    • Diarrhea     WITH ABDOMINAL CRAMPING   • Diverticulitis    • Elevated cholesterol    • GERD (gastroesophageal reflux disease)    • High cholesterol    • History of being tatooed    • History of bronchitis    • History of fracture     HX OF FOOT (SPOUSE UNSURE LATERALITY)   • History of gout    • Hypertension    • Myocardial infarction (CMS/HCC)     SPOUSE REPORTS SOMEWHERE BETWEEN THE YEARS OF [id number]   • Sleep apnea     USES BIPAP HS - TO BRING IN THE DOS   • Wears glasses        Medications    Current Outpatient Medications:   •  B-D UF III MINI PEN " NEEDLES 31G X 5 MM misc, USE ONCE DAILY WITH INSULIN INJECTIONS AS DIRECTED, Disp: , Rfl: 11  •  carvedilol (COREG) 25 MG tablet, Take 25 mg by mouth 2 (Two) Times a Day. Take with food, Disp: , Rfl:   •  cetirizine (ZyrTEC) 10 MG tablet, Take 12 mg by mouth Daily., Disp: , Rfl:   •  citalopram (CeleXA) 20 MG tablet, Take 20 mg by mouth Daily., Disp: , Rfl:   •  Ertugliflozin L-PyroglutamicAc (Steglatro) 15 MG tablet, Take 1 tablet by mouth Every Morning., Disp: 30 tablet, Rfl: 6  •  glyburide (DIAbeta) 5 MG tablet, Take 2 tablets by mouth 2 (Two) Times a Day Before Meals., Disp: 120 tablet, Rfl: 6  •  hydrochlorothiazide (HYDRODIURIL) 25 MG tablet, Take 25 mg by mouth Daily., Disp: , Rfl:   •  ibuprofen (ADVIL,MOTRIN) 800 MG tablet, Take 1 tablet by mouth Every 8 (Eight) Hours As Needed for Mild Pain  or Moderate Pain ., Disp: 20 tablet, Rfl: 0  •  Insulin Glargine (BASAGLAR KWIKPEN) 100 UNIT/ML injection pen, Increase by 5 units every 3 days up to 80u daily, Disp: 8 pen, Rfl: 3  •  Insulin Pen Needle 32G X 6 MM misc, Use daily with insulin, Disp: 100 each, Rfl: 11  •  lisinopril (PRINIVIL,ZESTRIL) 40 MG tablet, Take 40 mg by mouth Daily., Disp: , Rfl:   •  metFORMIN (GLUCOPHAGE) 1000 MG tablet, Take 1 tablet by mouth 2 (Two) Times a Day With Meals., Disp: 60 tablet, Rfl: 3  •  modafinil (PROVIGIL) 100 MG tablet, TAKE 1 TABLET BY MOUTH ONE TIME A DAY , Disp: 30 tablet, Rfl: 0  •  nystatin-triamcinolone (MYCOLOG) 019636-3.1 UNIT/GM-% ointment, Apply to affected area 2 times daily, Disp: 30 g, Rfl: 1  •  Omega 3 1200 MG capsule, Take 1,000 mg by mouth 2 (Two) Times a Day., Disp: , Rfl:   •  sildenafil (REVATIO) 20 MG tablet, Take 3 tablets by mouth Daily As Needed (ED)., Disp: 30 tablet, Rfl: 11  •  simvastatin (ZOCOR) 20 MG tablet, Take 20 mg by mouth Every Night., Disp: , Rfl:   •  Blood Glucose Monitoring Suppl device, Use as directed to check blood sugar, Disp: 1 each, Rfl: 0  •  glucose blood test strip, Use as  "directed to check blood sugar, Disp: 100 each, Rfl: 11  •  Insulin Pen Needle (PEN NEEDLES 1/2\") 29G X 12MM misc, Use as directed to inject insulin, Disp: 100 each, Rfl: 11  •  Semaglutide,0.25 or 0.5MG/DOS, (Ozempic, 0.25 or 0.5 MG/DOSE,) 2 MG/1.5ML solution pen-injector, Start with 0.25mg sc weekly x 4 weeks then increase to 0.5mg weekly, Disp: 2 pen, Rfl: 3    Review of Systems  Review of Systems   Constitutional: Positive for appetite change (decreased) and fatigue. Negative for chills, fever and unexpected weight change.        Feeling poorly   HENT: Negative for ear discharge, ear pain, hearing loss, nosebleeds, rhinorrhea and sore throat.    Eyes: Positive for visual disturbance. Negative for pain and redness.   Respiratory: Negative for cough, shortness of breath and wheezing.    Cardiovascular: Negative for chest pain, palpitations and leg swelling.   Gastrointestinal: Positive for diarrhea and nausea. Negative for abdominal pain, blood in stool, constipation and vomiting.   Endocrine: Negative for cold intolerance, heat intolerance and polydipsia.   Genitourinary: Negative for difficulty urinating, discharge, dysuria, hematuria, penile pain, penile swelling, scrotal swelling, testicular pain and urgency.   Musculoskeletal: Positive for arthralgias and myalgias. Negative for gait problem and joint swelling.   Skin: Negative for rash.   Allergic/Immunologic: Negative.    Neurological: Positive for headaches. Negative for dizziness, syncope, weakness and numbness.   Hematological: Negative for adenopathy. Does not bruise/bleed easily.   Psychiatric/Behavioral: Positive for sleep disturbance. Negative for suicidal ideas. The patient is not nervous/anxious.         Depressed        Physical Exam    /80   Pulse 81   Wt (!) 183 kg (403 lb 9.6 oz)   SpO2 99%   BMI 49.13 kg/m² Body mass index is 49.13 kg/m².  Physical Exam   Constitutional: He is oriented to person, place, and time. He appears " "well-developed. No distress.   HENT:   Head: Normocephalic.   Right Ear: External ear normal.   Left Ear: External ear normal.   Nose: Nose normal.   Eyes: Conjunctivae are normal. Right eye exhibits no discharge. Left eye exhibits no discharge. No scleral icterus.   Neck: Neck supple. No JVD present. No tracheal deviation present. No thyromegaly present.   Cardiovascular: Normal rate, regular rhythm, normal heart sounds and intact distal pulses.   No murmur heard.  Pulmonary/Chest: Effort normal and breath sounds normal. No respiratory distress. He has no wheezes.   Abdominal: Soft. Bowel sounds are normal. There is no tenderness.   Musculoskeletal: He exhibits no edema or tenderness.   Neurological: He is alert and oriented to person, place, and time.   Skin: Skin is warm and dry. No rash noted. He is not diaphoretic. No erythema.   Psychiatric: He has a normal mood and affect. His behavior is normal. Judgment and thought content normal.       Labs and Imaging   Lab Results   Component Value Date    HGBA1C 9.0 06/10/2020    HGBA1C 11.0 10/30/2019    HGBA1C 8.10 (H) 11/01/2017     Office Visit on 06/10/2020   Component Date Value Ref Range Status   • Glucose 06/10/2020 310* 70 - 130 mg/dL Final   • Hemoglobin A1C 06/10/2020 9.0  % Final     No images are attached to the encounter or orders placed in the encounter.  No Images in the past 120 days found..    Assessment / Plan   Raul was seen today for follow-up.    Diagnoses and all orders for this visit:    Uncontrolled type 2 diabetes mellitus with hyperglycemia (CMS/[facility])  -     POC Glucose Fingerstick  -     POC Glycosylated Hemoglobin (Hb A1C)  -     Insulin Glargine (BASAGLAR KWIKPEN) 100 UNIT/ML injection pen; Increase by 5 units every 3 days up to 80u daily  -     Insulin Pen Needle (PEN NEEDLES 1/2\") 29G X 12MM misc; Use as directed to inject insulin  -     glucose blood test strip; Use as directed to check blood sugar  -     Blood Glucose Monitoring Suppl " device; Use as directed to check blood sugar  -     Semaglutide,0.25 or 0.5MG/DOS, (Ozempic, 0.25 or 0.5 MG/DOSE,) 2 MG/1.5ML solution pen-injector; Start with 0.25mg sc weekly x 4 weeks then increase to 0.5mg weekly  -     Ertugliflozin L-PyroglutamicAc (Steglatro) 15 MG tablet; Take 1 tablet by mouth Every Morning.  -     glyburide (DIAbeta) 5 MG tablet; Take 2 tablets by mouth 2 (Two) Times a Day Before Meals.  -     metFORMIN (GLUCOPHAGE) 1000 MG tablet; Take 1 tablet by mouth 2 (Two) Times a Day With Meals.  -     Microalbumin / Creatinine Urine Ratio - Urine, Clean Catch  -     Comprehensive Metabolic Panel  -     Lipid Panel  -     TSH  -     Ambulatory Referral to Diabetic Education        Diabetes Mellitus 2 is under poor control, but with some improvement  -A1c 9, down from 11 10/2019  -discussed diet, he feels like he doesn't know what to eat. Emphasis on protein and non-starchy vegetables. Literature provided. Referral to diabetes education, he agrees.    -increase basaglar 3by 5u q3 days, up to 80 units daily  -add ozempic 0.25mg sc weekly x 4 weeks then increase to 0.5mg sc weekly. Risks reviewed.  -continue steglatro 15mg daily   -continue glyburide 5mg 2 tab BID AC  -cont metformin 1000mg BID  -discussed with pt next step is prandial insulin if a1c not <7 next visit  -written instructions reviewed with pt     1.  Diet: 3-4 carb servings per meal for females, 4-5 carb servings per meal for males  Spread carb intake throughout the day  Increase lean protein and vegetable intake  Avoid sugary drinks and processed carbs including crackers, cookies, cakes  2.  Exercise: Recommend at least 30 minutes of exercise daily, at least 5 days per week. Increase exercise gradually.   3.  Blood Glucose Goal: Blood glucose goal <150 fasting, <180 2 hr postprandial  4.  Microalbumin due  5.  Education performed during this visit: long term diabetic complications discussed. , annual eye examinations at Ophthalmology  discussed, dental hygiene discussed  and foot care reviewed., home glucose monitoring emphasized, all medications, side effects and compliance discussed carefully and Hypoglycemia management and prevention reviewed. Reviewed ‘ABCs’ of diabetes management (respective goals in parentheses):  A1C (<7), blood pressure (<130/80), and cholesterol (LDL <100, if CVD <70).    Patient Instructions   Start Ozempic 0.25mg weekly for 4 weeks then increase to 0.5mg weekly.  Increase Basaglar by 5 units every 3 days up to 80 units daily.  Continue metformin, steglatro, glyburide       Follow up: Return in about 3 months (around 9/10/2020).    Discussed the nature of the disease including, risks, complications, implications, management, safe and proper use of medications. Encouraged therapeutic lifestyle changes including low calorie diet with plenty of fruits and vegetables, daily exercise, medication compliance, and keeping scheduled follow up appointments with me and any other providers. Encouraged patient to have appointment for complete physical, fasting labs, appropriate screenings, and immunizations on an annual basis.      Jo-Ann Clark PA-C

## 2020-06-11 LAB
ALBUMIN SERPL-MCNC: 4.3 G/DL (ref 3.5–5.2)
ALBUMIN/GLOB SERPL: 1.2 G/DL
ALP SERPL-CCNC: 85 U/L (ref 39–117)
ALT SERPL W P-5'-P-CCNC: 47 U/L (ref 1–41)
ANION GAP SERPL CALCULATED.3IONS-SCNC: 13.9 MMOL/L (ref 5–15)
ARTICHOKE IGE QN: 30 MG/DL (ref 0–100)
AST SERPL-CCNC: 37 U/L (ref 1–40)
BILIRUB SERPL-MCNC: 0.4 MG/DL (ref 0.2–1.2)
BUN BLD-MCNC: 20 MG/DL (ref 6–20)
BUN/CREAT SERPL: 18.5 (ref 7–25)
CALCIUM SPEC-SCNC: 9.7 MG/DL (ref 8.6–10.5)
CHLORIDE SERPL-SCNC: 97 MMOL/L (ref 98–107)
CHOLEST SERPL-MCNC: 114 MG/DL (ref 0–200)
CO2 SERPL-SCNC: 23.1 MMOL/L (ref 22–29)
CREAT BLD-MCNC: 1.08 MG/DL (ref 0.76–1.27)
GFR SERPL CREATININE-BSD FRML MDRD: 74 ML/MIN/1.73
GLOBULIN UR ELPH-MCNC: 3.5 GM/DL
GLUCOSE BLD-MCNC: 211 MG/DL (ref 65–99)
HDLC SERPL-MCNC: 25 MG/DL (ref 40–60)
LDLC SERPL CALC-MCNC: ABNORMAL MG/DL
LDLC/HDLC SERPL: ABNORMAL {RATIO}
POTASSIUM BLD-SCNC: 4.3 MMOL/L (ref 3.5–5.2)
PROT SERPL-MCNC: 7.8 G/DL (ref 6–8.5)
SODIUM BLD-SCNC: 134 MMOL/L (ref 136–145)
TRIGL SERPL-MCNC: 1380 MG/DL (ref 0–150)
VLDLC SERPL-MCNC: ABNORMAL MG/DL

## 2020-06-12 ENCOUNTER — TELEPHONE (OUTPATIENT)
Dept: ENDOCRINOLOGY | Facility: CLINIC | Age: 45
End: 2020-06-12

## 2020-06-12 DIAGNOSIS — E78.2 MIXED HYPERLIPIDEMIA: Primary | ICD-10-CM

## 2020-06-12 RX ORDER — ICOSAPENT ETHYL 1000 MG/1
2 CAPSULE ORAL 2 TIMES DAILY WITH MEALS
Qty: 120 CAPSULE | Refills: 3 | Status: SHIPPED | OUTPATIENT
Start: 2020-06-12 | End: 2020-12-28

## 2020-06-12 RX ORDER — ATORVASTATIN CALCIUM 40 MG/1
40 TABLET, FILM COATED ORAL DAILY
Qty: 30 TABLET | Refills: 3 | Status: SHIPPED | OUTPATIENT
Start: 2020-06-12 | End: 2020-09-21 | Stop reason: SDUPTHER

## 2020-06-26 ENCOUNTER — TRANSCRIBE ORDERS (OUTPATIENT)
Dept: MRI IMAGING | Facility: HOSPITAL | Age: 45
End: 2020-06-26

## 2020-06-26 DIAGNOSIS — G43.009 MIGRAINE WITHOUT AURA, NOT INTRACTABLE, WITHOUT STATUS MIGRAINOSUS: Primary | ICD-10-CM

## 2020-06-29 RX ORDER — FAMOTIDINE 20 MG/1
TABLET, FILM COATED ORAL
Qty: 60 TABLET | Refills: 0 | OUTPATIENT
Start: 2020-06-29

## 2020-07-07 ENCOUNTER — APPOINTMENT (OUTPATIENT)
Dept: CT IMAGING | Facility: HOSPITAL | Age: 45
End: 2020-07-07

## 2020-07-07 ENCOUNTER — HOSPITAL ENCOUNTER (EMERGENCY)
Facility: HOSPITAL | Age: 45
Discharge: HOME | End: 2020-07-07
Attending: STUDENT IN AN ORGANIZED HEALTH CARE EDUCATION/TRAINING PROGRAM | Admitting: STUDENT IN AN ORGANIZED HEALTH CARE EDUCATION/TRAINING PROGRAM

## 2020-07-07 VITALS
SYSTOLIC BLOOD PRESSURE: 129 MMHG | HEART RATE: 84 BPM | HEIGHT: 76 IN | RESPIRATION RATE: 16 BRPM | OXYGEN SATURATION: 95 % | TEMPERATURE: 98.6 F | WEIGHT: 315 LBS | DIASTOLIC BLOOD PRESSURE: 75 MMHG | BODY MASS INDEX: 38.36 KG/M2

## 2020-07-07 DIAGNOSIS — R11.2 NAUSEA, VOMITING AND DIARRHEA: Primary | ICD-10-CM

## 2020-07-07 DIAGNOSIS — R19.7 NAUSEA, VOMITING AND DIARRHEA: Primary | ICD-10-CM

## 2020-07-07 LAB
ALBUMIN SERPL-MCNC: 3.8 G/DL (ref 3.5–5.2)
ALBUMIN/GLOB SERPL: 1.1 G/DL
ALP SERPL-CCNC: 100 U/L (ref 39–117)
ALT SERPL W P-5'-P-CCNC: 44 U/L (ref 1–41)
ANION GAP SERPL CALCULATED.3IONS-SCNC: 13.1 MMOL/L (ref 5–15)
AST SERPL-CCNC: 27 U/L (ref 1–40)
BILIRUB SERPL-MCNC: 0.7 MG/DL (ref 0–1.2)
BILIRUB UR QL STRIP: NEGATIVE
BUN SERPL-MCNC: 23 MG/DL (ref 6–20)
BUN/CREAT SERPL: 17.7 (ref 7–25)
CALCIUM SPEC-SCNC: 9.1 MG/DL (ref 8.6–10.5)
CHLORIDE SERPL-SCNC: 98 MMOL/L (ref 98–107)
CLARITY UR: CLEAR
CO2 SERPL-SCNC: 20.9 MMOL/L (ref 22–29)
COLOR UR: YELLOW
CREAT SERPL-MCNC: 1.3 MG/DL (ref 0.76–1.27)
DEPRECATED RDW RBC AUTO: 45.1 FL (ref 37–54)
EOSINOPHIL # BLD MANUAL: 3.22 10*3/MM3 (ref 0–0.4)
EOSINOPHIL NFR BLD MANUAL: 19 % (ref 0.3–6.2)
ERYTHROCYTE [DISTWIDTH] IN BLOOD BY AUTOMATED COUNT: 14.6 % (ref 12.3–15.4)
GFR SERPL CREATININE-BSD FRML MDRD: 59 ML/MIN/1.73
GLOBULIN UR ELPH-MCNC: 3.5 GM/DL
GLUCOSE SERPL-MCNC: 201 MG/DL (ref 65–99)
GLUCOSE UR STRIP-MCNC: ABNORMAL MG/DL
HCT VFR BLD AUTO: 54.9 % (ref 37.5–51)
HGB BLD-MCNC: 18.9 G/DL (ref 13–17.7)
HGB UR QL STRIP.AUTO: NEGATIVE
KETONES UR QL STRIP: NEGATIVE
LEUKOCYTE ESTERASE UR QL STRIP.AUTO: NEGATIVE
LIPASE SERPL-CCNC: 31 U/L (ref 13–60)
LYMPHOCYTES # BLD MANUAL: 2.88 10*3/MM3 (ref 0.7–3.1)
LYMPHOCYTES NFR BLD MANUAL: 17 % (ref 19.6–45.3)
LYMPHOCYTES NFR BLD MANUAL: 7 % (ref 5–12)
MCH RBC QN AUTO: 29.5 PG (ref 26.6–33)
MCHC RBC AUTO-ENTMCNC: 34.4 G/DL (ref 31.5–35.7)
MCV RBC AUTO: 85.8 FL (ref 79–97)
MONOCYTES # BLD AUTO: 1.19 10*3/MM3 (ref 0.1–0.9)
NEUTROPHILS # BLD AUTO: 9.65 10*3/MM3 (ref 1.7–7)
NEUTROPHILS NFR BLD MANUAL: 55 % (ref 42.7–76)
NEUTS BAND NFR BLD MANUAL: 2 % (ref 0–5)
NITRITE UR QL STRIP: NEGATIVE
PH UR STRIP.AUTO: 5.5 [PH] (ref 5–8)
PLATELET # BLD AUTO: 248 10*3/MM3 (ref 140–450)
PMV BLD AUTO: 10.5 FL (ref 6–12)
POTASSIUM SERPL-SCNC: 3.6 MMOL/L (ref 3.5–5.2)
PROT SERPL-MCNC: 7.3 G/DL (ref 6–8.5)
PROT UR QL STRIP: NEGATIVE
RBC # BLD AUTO: 6.4 10*6/MM3 (ref 4.14–5.8)
RBC MORPH BLD: NORMAL
SCAN SLIDE: NORMAL
SMALL PLATELETS BLD QL SMEAR: ADEQUATE
SODIUM SERPL-SCNC: 132 MMOL/L (ref 136–145)
SP GR UR STRIP: >=1.03 (ref 1–1.03)
TROPONIN T SERPL-MCNC: <0.01 NG/ML (ref 0–0.03)
UROBILINOGEN UR QL STRIP: ABNORMAL
WBC # BLD AUTO: 16.93 10*3/MM3 (ref 3.4–10.8)
WBC MORPH BLD: NORMAL

## 2020-07-07 PROCEDURE — 25010000002 ONDANSETRON PER 1 MG: Performed by: PHYSICIAN ASSISTANT

## 2020-07-07 PROCEDURE — 96374 THER/PROPH/DIAG INJ IV PUSH: CPT

## 2020-07-07 PROCEDURE — 81003 URINALYSIS AUTO W/O SCOPE: CPT | Performed by: PHYSICIAN ASSISTANT

## 2020-07-07 PROCEDURE — 99283 EMERGENCY DEPT VISIT LOW MDM: CPT

## 2020-07-07 PROCEDURE — 85025 COMPLETE CBC W/AUTO DIFF WBC: CPT | Performed by: PHYSICIAN ASSISTANT

## 2020-07-07 PROCEDURE — 84484 ASSAY OF TROPONIN QUANT: CPT | Performed by: PHYSICIAN ASSISTANT

## 2020-07-07 PROCEDURE — 93005 ELECTROCARDIOGRAM TRACING: CPT | Performed by: PHYSICIAN ASSISTANT

## 2020-07-07 PROCEDURE — 80053 COMPREHEN METABOLIC PANEL: CPT | Performed by: PHYSICIAN ASSISTANT

## 2020-07-07 PROCEDURE — 74176 CT ABD & PELVIS W/O CONTRAST: CPT

## 2020-07-07 PROCEDURE — 85007 BL SMEAR W/DIFF WBC COUNT: CPT | Performed by: PHYSICIAN ASSISTANT

## 2020-07-07 PROCEDURE — 83690 ASSAY OF LIPASE: CPT | Performed by: PHYSICIAN ASSISTANT

## 2020-07-07 RX ORDER — ONDANSETRON 4 MG/1
4 TABLET, ORALLY DISINTEGRATING ORAL EVERY 8 HOURS PRN
Qty: 12 TABLET | Refills: 0 | Status: SHIPPED | OUTPATIENT
Start: 2020-07-07

## 2020-07-07 RX ORDER — SODIUM CHLORIDE 0.9 % (FLUSH) 0.9 %
10 SYRINGE (ML) INJECTION AS NEEDED
Status: DISCONTINUED | OUTPATIENT
Start: 2020-07-07 | End: 2020-07-07 | Stop reason: HOSPADM

## 2020-07-07 RX ORDER — ONDANSETRON 2 MG/ML
4 INJECTION INTRAMUSCULAR; INTRAVENOUS ONCE
Status: COMPLETED | OUTPATIENT
Start: 2020-07-07 | End: 2020-07-07

## 2020-07-07 RX ORDER — LOPERAMIDE HYDROCHLORIDE 2 MG/1
2 TABLET ORAL 3 TIMES DAILY PRN
Qty: 20 TABLET | Refills: 0 | Status: SHIPPED | OUTPATIENT
Start: 2020-07-07 | End: 2021-09-29

## 2020-07-07 RX ORDER — CIPROFLOXACIN 500 MG/1
500 TABLET, FILM COATED ORAL DAILY
Qty: 7 TABLET | Refills: 0 | Status: SHIPPED | OUTPATIENT
Start: 2020-07-07 | End: 2020-09-21

## 2020-07-07 RX ADMIN — ONDANSETRON 4 MG: 2 INJECTION INTRAMUSCULAR; INTRAVENOUS at 11:28

## 2020-07-07 RX ADMIN — SODIUM CHLORIDE 1000 ML: 9 INJECTION, SOLUTION INTRAVENOUS at 13:33

## 2020-07-07 RX ADMIN — SODIUM CHLORIDE 1000 ML: 9 INJECTION, SOLUTION INTRAVENOUS at 11:28

## 2020-07-07 NOTE — ED PROVIDER NOTES
Subjective   Patient is here with complaint of some intermittent nausea, vomiting diarrhea for the past few days he has had some recent medication change in diet change, no fever chills no chest pain or shortness of air some abdominal cramps... No other systemic complaints presents here ambulatory.. States his last episode of emesis was last night he did eat some noodles this morning, has had some intermittent diarrhea again... Nonbloody, nonbilious emesis and/or diarrhea      History provided by:  Patient      Review of Systems   Constitutional: Positive for appetite change. Negative for fever.   HENT: Negative.    Eyes: Negative.    Respiratory: Negative.    Cardiovascular: Negative.    Gastrointestinal: Positive for abdominal pain, diarrhea, nausea and vomiting.   Genitourinary: Negative.    Musculoskeletal: Positive for arthralgias. Negative for neck pain and neck stiffness.   Skin: Negative.    Neurological: Negative.    Psychiatric/Behavioral: Negative.    All other systems reviewed and are negative.      Past Medical History:   Diagnosis Date   • Arthritis    • Asthma     SPOUSE REPORTS ISSUES AS A CHILD   • Bell's palsy    • Blood in stool    • Depression    • Diabetes (CMS/HCC)    • Diarrhea     WITH ABDOMINAL CRAMPING   • Diverticulitis    • Elevated cholesterol    • GERD (gastroesophageal reflux disease)    • High cholesterol    • History of being tatooed    • History of bronchitis    • History of fracture     HX OF FOOT (SPOUSE UNSURE LATERALITY)   • History of gout    • Hypertension    • Myocardial infarction (CMS/HCC)     SPOUSE REPORTS SOMEWHERE BETWEEN THE YEARS OF [id number]   • Sleep apnea     USES BIPAP HS - TO BRING IN THE DOS   • Wears glasses        No Known Allergies    Past Surgical History:   Procedure Laterality Date   • APPENDECTOMY  1990   • CARDIAC CATHETERIZATION     • CARDIAC CATHETERIZATION N/A 9/30/2017    Procedure: Coronary angiography;  Surgeon: Zander Bobo MD;  Location:   [facility] CATH INVASIVE LOCATION;  Service:    • COLONOSCOPY N/A 6/25/2018    Procedure: COLONOSCOPY with hot snare polypectomy,  cold snare polypectomy, cold biopsy polypectomies, and biopsies;  Surgeon: Sandro Potter MD;  Location: [facility] ENDOSCOPY;  Service: Gastroenterology   • KNEE SURGERY Left     removed infection/mass in knee   • OTHER SURGICAL HISTORY      SPOUSE REPORTS GROWTH REMOVED FROM THROAT WHEN PATIENT WAS A YOUNG CHILD   • TONSILLECTOMY         Family History   Problem Relation Age of Onset   • Diabetes Mother    • Alcohol abuse Father    • Diabetes Father    • Heart disease Father        Social History     Socioeconomic History   • Marital status:      Spouse name: Not on file   • Number of children: Not on file   • Years of education: Not on file   • Highest education level: Not on file   Tobacco Use   • Smoking status: Former Smoker     Packs/day: 0.25     Years: 0.50     Pack years: 0.12     Types: Cigarettes   • Smokeless tobacco: Never Used   • Tobacco comment: SPOUSE REPORTS PATIENT SMOKED VERY BRIEFLY WITH NO ADDICTIVE HABIT AS A TEENAGER. QUIT OVER 25 YEARS AGO.   Substance and Sexual Activity   • Alcohol use: No   • Drug use: No   • Sexual activity: Defer           Objective   Physical Exam   Constitutional: He is oriented to person, place, and time. He appears well-developed and well-nourished. No distress.   Afebrile nontoxic no acute distress ambulatory here   HENT:   Head: Normocephalic and atraumatic.   Mouth/Throat: Oropharynx is clear and moist.   Eyes: Pupils are equal, round, and reactive to light. Conjunctivae and EOM are normal. No scleral icterus.   Neck: Normal range of motion. Neck supple.   Cardiovascular: Regular rhythm and intact distal pulses.   Pulmonary/Chest: Effort normal and breath sounds normal.   Abdominal: Soft. Bowel sounds are normal. There is tenderness.   Mild Tenderness suprapubic no rebound or guarding   Musculoskeletal: Normal range of motion. He  exhibits no edema.   Lymphadenopathy:     He has no cervical adenopathy.   Neurological: He is alert and oriented to person, place, and time. No cranial nerve deficit or sensory deficit. He exhibits normal muscle tone. Coordination normal.   Skin: Skin is warm and dry. Capillary refill takes less than 2 seconds. No rash noted. He is not diaphoretic. No erythema. No pallor.   Psychiatric: He has a normal mood and affect. His behavior is normal. Judgment and thought content normal.   Nursing note and vitals reviewed.      Procedures           ED Course  ED Course as of Jul 07 1525   Tue Jul 07, 2020   1156 Patient is resting comfortably no acute distress, patient case and management reviewed with Dr. Welch    [SC]   1238 Radiology CT imaging abdomen pelvis no acute finding    [SC]   1239 EKG shows a sinus rhythm with a rate of 94.  Right bundle branch block.  No significant ST segments.  Abnormal EKG.  Interpreted by me.    [DT]   1315 Patient resting comfortably no distress, states he is feeling better    [SC]   1449 Reassessment, abdomen is soft no rebound or guarding patient states he feels better, he has not had any vomiting or diarrhea while here over the past 4 hours..... We will plan on discharging home soon with strict return to care precautions clear liquid to bland food diet recommendation to follow-up with PCP tomorrow to return here for any sudden change worsening concerns patient understand agree with plan of care    [SC]   1449 Again patient case and management all reviewed with Dr. Welch    [SC]      ED Course User Index  [DT] Marvin Welch MD  [SC] Keenan Emery, HERBERT                                           MDM  Number of Diagnoses or Management Options     Amount and/or Complexity of Data Reviewed  Clinical lab tests: reviewed  Tests in the radiology section of CPT®: reviewed  Decide to obtain previous medical records or to obtain history from someone other than the patient: yes  Review  and summarize past medical records: yes  Discuss the patient with other providers: yes    Risk of Complications, Morbidity, and/or Mortality  Presenting problems: moderate  Diagnostic procedures: low  Management options: low        Final diagnoses:   Nausea, vomiting and diarrhea            Keenan Emery PA-C  07/07/20 152

## 2020-07-14 ENCOUNTER — HOSPITAL ENCOUNTER (OUTPATIENT)
Dept: MRI IMAGING | Facility: HOSPITAL | Age: 45
Discharge: HOME | End: 2020-07-14

## 2020-07-14 DIAGNOSIS — G43.009 MIGRAINE WITHOUT AURA, NOT INTRACTABLE, WITHOUT STATUS MIGRAINOSUS: ICD-10-CM

## 2020-07-24 DIAGNOSIS — E11.65 UNCONTROLLED TYPE 2 DIABETES MELLITUS WITH HYPERGLYCEMIA (HCC): ICD-10-CM

## 2020-07-24 NOTE — TELEPHONE ENCOUNTER
PATIENT IS RUNNING OUT OF MEDICATION. HE IS NOT ABLE TO FILL IT AGAIN TILL AUGUST 3. THE PEN IS ALREADY EMPTY. HE NEW PRESCRIPTION THAT'S GOING TO LAST LONGER FOR THE AMOUNT HE IS USING [place]. PATIENTS NUMBER [phone]

## 2020-09-02 ENCOUNTER — TRANSCRIBE ORDERS (OUTPATIENT)
Dept: ADMINISTRATIVE | Facility: HOSPITAL | Age: 45
End: 2020-09-02

## 2020-09-02 DIAGNOSIS — R10.11 RUQ ABDOMINAL PAIN: Primary | ICD-10-CM

## 2020-09-15 ENCOUNTER — HOSPITAL ENCOUNTER (OUTPATIENT)
Dept: ULTRASOUND IMAGING | Facility: HOSPITAL | Age: 45
Discharge: HOME | End: 2020-09-15
Admitting: NURSE PRACTITIONER

## 2020-09-15 DIAGNOSIS — R10.11 RUQ ABDOMINAL PAIN: ICD-10-CM

## 2020-09-15 PROCEDURE — 76700 US EXAM ABDOM COMPLETE: CPT

## 2020-09-21 ENCOUNTER — OFFICE VISIT (OUTPATIENT)
Dept: ENDOCRINOLOGY | Facility: CLINIC | Age: 45
End: 2020-09-21

## 2020-09-21 VITALS
SYSTOLIC BLOOD PRESSURE: 122 MMHG | OXYGEN SATURATION: 98 % | WEIGHT: 315 LBS | DIASTOLIC BLOOD PRESSURE: 84 MMHG | HEART RATE: 82 BPM | BODY MASS INDEX: 45.74 KG/M2

## 2020-09-21 DIAGNOSIS — E11.42 WELL CONTROLLED TYPE 2 DIABETES MELLITUS WITH PERIPHERAL NEUROPATHY (HCC): Primary | ICD-10-CM

## 2020-09-21 DIAGNOSIS — E78.2 MIXED HYPERLIPIDEMIA: ICD-10-CM

## 2020-09-21 DIAGNOSIS — E11.42 DIABETIC PERIPHERAL NEUROPATHY ASSOCIATED WITH TYPE 2 DIABETES MELLITUS (HCC): ICD-10-CM

## 2020-09-21 LAB
GLUCOSE BLDC GLUCOMTR-MCNC: 113 MG/DL (ref 70–130)
HBA1C MFR BLD: 6.1 %

## 2020-09-21 PROCEDURE — 82947 ASSAY GLUCOSE BLOOD QUANT: CPT | Performed by: PHYSICIAN ASSISTANT

## 2020-09-21 PROCEDURE — 83036 HEMOGLOBIN GLYCOSYLATED A1C: CPT | Performed by: PHYSICIAN ASSISTANT

## 2020-09-21 PROCEDURE — 99214 OFFICE O/P EST MOD 30 MIN: CPT | Performed by: PHYSICIAN ASSISTANT

## 2020-09-21 RX ORDER — ATORVASTATIN CALCIUM 40 MG/1
40 TABLET, FILM COATED ORAL DAILY
Qty: 30 TABLET | Refills: 6 | Status: SHIPPED | OUTPATIENT
Start: 2020-09-21 | End: 2021-04-21

## 2020-09-21 RX ORDER — GLYBURIDE 5 MG/1
10 TABLET ORAL
Qty: 120 TABLET | Refills: 6 | Status: SHIPPED | OUTPATIENT
Start: 2020-09-21 | End: 2021-04-21

## 2020-09-21 RX ORDER — INSULIN GLARGINE 100 [IU]/ML
80 INJECTION, SOLUTION SUBCUTANEOUS NIGHTLY
Qty: 8 PEN | Refills: 6 | Status: SHIPPED | OUTPATIENT
Start: 2020-09-21 | End: 2021-04-26 | Stop reason: CLARIF

## 2020-09-21 NOTE — PROGRESS NOTES
"Chief Complaint  F/u for Diabetes Mellitus.    HPI   Raul Welch is a 45 y.o. male who is here today for f/u of Diabetes Mellitus type 2. The initial diagnosis of diabetes was made in his 20s.     Pt was no show for diabetes education 6/30/2020.   Has lost 25# since last visit.     A1C- 6.1 (9/21/2020), 9 (6/10/2020), > 11 (10/30/19), 10.3 (9/5/19)    Diabetic complications: peripheral neuropathy  Eye exam current (within one year): due  Foot care and dental care: discussed    Current diabetic medications include:  Metformin 1000mg BID  Glyburide 5mg 2 tab BID AC  Steglatro 15mg daily  Ozempic 0.5mg sc weekly - tolerating well  Basaglar 80u qhs    Statin: zocor 20    Past medications: none    Diabetic Monitoring  -   Meter readings reviewed- he checks once a day in the evening- readings mostly 100-150s. He has written a few fasting readings 85-150s.   He had a single episode of hypoglycemia last week after he took all his medications accidentally after he already took them.     Freestyle Jose was expensive.     Nutrition:     Current diet: in general, an \"unhealthy\" diet, on average, 2 meals per day, lunch and dinner. No sugared drinks.   Current exercise: none    The following portions of the patient's history were reviewed and updated by me as appropriate: allergies, current medications, past family history, past social history, past surgical history and problem list.        Past Medical History:   Diagnosis Date   • Arthritis    • Asthma     SPOUSE REPORTS ISSUES AS A CHILD   • Bell's palsy    • Blood in stool    • Depression    • Diabetes (CMS/[facility])    • Diarrhea     WITH ABDOMINAL CRAMPING   • Diverticulitis    • Elevated cholesterol    • GERD (gastroesophageal reflux disease)    • High cholesterol    • History of being tatooed    • History of bronchitis    • History of fracture     HX OF FOOT (SPOUSE UNSURE LATERALITY)   • History of gout    • Hypertension    • Myocardial infarction (CMS/[facility])     SPOUSE " "REPORTS SOMEWHERE BETWEEN THE YEARS OF [id number]   • Sleep apnea     USES BIPAP HS - TO BRING IN THE DOS   • Wears glasses        Medications    Current Outpatient Medications:   •  atorvastatin (Lipitor) 40 MG tablet, Take 1 tablet by mouth Daily., Disp: 30 tablet, Rfl: 6  •  B-D UF III MINI PEN NEEDLES 31G X 5 MM misc, USE ONCE DAILY WITH INSULIN INJECTIONS AS DIRECTED, Disp: , Rfl: 11  •  Blood Glucose Monitoring Suppl device, Use as directed to check blood sugar, Disp: 1 each, Rfl: 0  •  carvedilol (COREG) 25 MG tablet, Take 25 mg by mouth 2 (Two) Times a Day. Take with food, Disp: , Rfl:   •  cetirizine (ZyrTEC) 10 MG tablet, Take 12 mg by mouth Daily., Disp: , Rfl:   •  citalopram (CeleXA) 20 MG tablet, Take 20 mg by mouth Daily., Disp: , Rfl:   •  Ertugliflozin L-PyroglutamicAc (Steglatro) 15 MG tablet, Take 1 tablet by mouth Every Morning., Disp: 30 tablet, Rfl: 6  •  glucose blood test strip, Use as directed to check blood sugar, Disp: 100 each, Rfl: 11  •  glyburide (DIAbeta) 5 MG tablet, Take 2 tablets by mouth 2 (Two) Times a Day Before Meals., Disp: 120 tablet, Rfl: 6  •  hydrochlorothiazide (HYDRODIURIL) 25 MG tablet, Take 25 mg by mouth Daily., Disp: , Rfl:   •  ibuprofen (ADVIL,MOTRIN) 800 MG tablet, Take 1 tablet by mouth Every 8 (Eight) Hours As Needed for Mild Pain  or Moderate Pain ., Disp: 20 tablet, Rfl: 0  •  icosapent ethyl (Vascepa) 1 g capsule capsule, Take 2 g by mouth 2 (Two) Times a Day With Meals., Disp: 120 capsule, Rfl: 3  •  Insulin Glargine (BASAGLAR KWIKPEN) 100 UNIT/ML injection pen, Inject 80 Units under the skin into the appropriate area as directed Every Night. Increase by 5 units every 3 days up to 80u daily, Disp: 8 pen, Rfl: 6  •  Insulin Pen Needle (PEN NEEDLES 1/2\") 29G X 12MM misc, Use as directed to inject insulin, Disp: 100 each, Rfl: 11  •  Insulin Pen Needle 32G X 6 MM misc, Use daily with insulin, Disp: 100 each, Rfl: 11  •  lisinopril (PRINIVIL,ZESTRIL) 40 MG " tablet, Take 40 mg by mouth Daily., Disp: , Rfl:   •  loperamide (Imodium A-D) 2 MG tablet, Take 1 tablet by mouth 3 (Three) Times a Day As Needed for Diarrhea., Disp: 20 tablet, Rfl: 0  •  metFORMIN (GLUCOPHAGE) 1000 MG tablet, Take 1 tablet by mouth 2 (Two) Times a Day With Meals., Disp: 60 tablet, Rfl: 6  •  modafinil (PROVIGIL) 100 MG tablet, TAKE 1 TABLET BY MOUTH ONE TIME A DAY , Disp: 30 tablet, Rfl: 0  •  nystatin-triamcinolone (MYCOLOG) 083240-3.1 UNIT/GM-% ointment, Apply to affected area 2 times daily, Disp: 30 g, Rfl: 1  •  Omega 3 1200 MG capsule, Take 1,000 mg by mouth 2 (Two) Times a Day., Disp: , Rfl:   •  ondansetron ODT (ZOFRAN-ODT) 4 MG disintegrating tablet, Place 1 tablet on the tongue Every 8 (Eight) Hours As Needed for Nausea or Vomiting., Disp: 12 tablet, Rfl: 0  •  Semaglutide,0.25 or 0.5MG/DOS, (Ozempic, 0.25 or 0.5 MG/DOSE,) 2 MG/1.5ML solution pen-injector, inject 0.5mg weekly as directed, Disp: 2 pen, Rfl: 3  •  sildenafil (REVATIO) 20 MG tablet, Take 3 tablets by mouth Daily As Needed (ED)., Disp: 30 tablet, Rfl: 11    Review of Systems  Review of Systems   Constitutional: Negative for appetite change, chills, fatigue, fever and unexpected weight change.   HENT: Negative for ear discharge, ear pain, hearing loss, nosebleeds, rhinorrhea and sore throat.    Eyes: Negative for pain, redness and visual disturbance.   Respiratory: Negative for cough, shortness of breath and wheezing.    Cardiovascular: Negative for chest pain, palpitations and leg swelling.   Gastrointestinal: Negative for abdominal pain, blood in stool, constipation, diarrhea, nausea and vomiting.   Endocrine: Negative for cold intolerance, heat intolerance and polydipsia.   Genitourinary: Negative for difficulty urinating, discharge, dysuria, hematuria, penile pain, penile swelling, scrotal swelling, testicular pain and urgency.   Musculoskeletal: Positive for arthralgias and myalgias. Negative for gait problem and joint  swelling.   Skin: Negative for rash.   Allergic/Immunologic: Negative.    Neurological: Negative for dizziness, syncope, weakness, numbness and headaches.   Hematological: Negative for adenopathy. Does not bruise/bleed easily.   Psychiatric/Behavioral: Negative for sleep disturbance and suicidal ideas. The patient is not nervous/anxious.         Physical Exam    /84   Pulse 82   Wt (!) 170 kg (375 lb 12.8 oz)   SpO2 98%   BMI 45.74 kg/m² Body mass index is 45.74 kg/m².  Physical Exam   Constitutional: He is oriented to person, place, and time. He appears well-developed. No distress.   HENT:   Head: Normocephalic.   Right Ear: External ear normal.   Left Ear: External ear normal.   Nose: Nose normal.   Eyes: Conjunctivae are normal. Right eye exhibits no discharge. Left eye exhibits no discharge. No scleral icterus.   Neck: Neck supple. No JVD present. No tracheal deviation present. No thyromegaly present.   Cardiovascular: Normal rate, regular rhythm and normal heart sounds.   No murmur heard.  Pulmonary/Chest: Effort normal and breath sounds normal. No respiratory distress. He has no wheezes.   Abdominal: Soft. Bowel sounds are normal. There is no abdominal tenderness.   Musculoskeletal: No tenderness.   Neurological: He is alert and oriented to person, place, and time.   Skin: Skin is warm and dry. No rash noted. He is not diaphoretic. No erythema.   Psychiatric: His behavior is normal. Judgment and thought content normal.       Labs and Imaging   Lab Results   Component Value Date    HGBA1C 6.1 09/21/2020    HGBA1C 9.0 06/10/2020    HGBA1C 11.0 10/30/2019     Office Visit on 09/21/2020   Component Date Value Ref Range Status   • Glucose 09/21/2020 113  70 - 130 mg/dL Final   • Hemoglobin A1C 09/21/2020 6.1  % Final     No images are attached to the encounter or orders placed in the encounter.  No Images in the past 120 days found..    Assessment / Plan   Raul was seen today for  follow-up.    Diagnoses and all orders for this visit:    Well controlled type 2 diabetes mellitus with peripheral neuropathy (CMS/HCC)  -     POC Glucose, Blood  -     POC Glycosylated Hemoglobin (Hb A1C)  -     Ertugliflozin L-PyroglutamicAc (Steglatro) 15 MG tablet; Take 1 tablet by mouth Every Morning.  -     glyburide (DIAbeta) 5 MG tablet; Take 2 tablets by mouth 2 (Two) Times a Day Before Meals.  -     Insulin Glargine (BASAGLAR KWIKPEN) 100 UNIT/ML injection pen; Inject 80 Units under the skin into the appropriate area as directed Every Night. Increase by 5 units every 3 days up to 80u daily  -     metFORMIN (GLUCOPHAGE) 1000 MG tablet; Take 1 tablet by mouth 2 (Two) Times a Day With Meals.    Mixed hyperlipidemia  -     atorvastatin (Lipitor) 40 MG tablet; Take 1 tablet by mouth Daily.    Diabetic peripheral neuropathy associated with type 2 diabetes mellitus (CMS/HCC)        Diabetes Mellitus 2 is under good control  -A1c 6.1, down from 9 6/2020  -pt has had a single episode of hypoglycemia after accidentally doubling up his medications. Otherwise no lows.     -has lost 25# since last visit  -continue basaglar 80 units daily  -continue ozempic 0.5mg sc weekly  -continue steglatro 15mg daily   -continue glyburide 5mg 2 tab BID AC  -cont metformin 1000mg BID    1.  Diet: 3-4 carb servings per meal for females, 4-5 carb servings per meal for males  Spread carb intake throughout the day  Increase lean protein and vegetable intake  Avoid sugary drinks and processed carbs including crackers, cookies, cakes  2.  Exercise: Recommend at least 30 minutes of exercise daily, at least 5 days per week. Increase exercise gradually.   3.  Blood Glucose Goal: Blood glucose goal <150 fasting, <180 2 hr postprandial  4.  Microalbumin due 6/2021  5.  Education performed during this visit: long term diabetic complications discussed. , annual eye examinations at Ophthalmology discussed, dental hygiene discussed  and foot care  reviewed., home glucose monitoring emphasized, all medications, side effects and compliance discussed carefully and Hypoglycemia management and prevention reviewed. Reviewed ‘ABCs’ of diabetes management (respective goals in parentheses):  A1C (<7), blood pressure (<130/80), and cholesterol (LDL <100, if CVD <70).    Peripheral neuropathy  -controlled  -continue to monitor    Mixed hyperlipidemia  -lipid panel utd  -continue lipitor 40mg qhs    There are no Patient Instructions on file for this visit.    Follow up: Return in about 4 months (around 1/21/2021).    Discussed the nature of the disease including, risks, complications, implications, management, safe and proper use of medications. Encouraged therapeutic lifestyle changes including low calorie diet with plenty of fruits and vegetables, daily exercise, medication compliance, and keeping scheduled follow up appointments with me and any other providers. Encouraged patient to have appointment for complete physical, fasting labs, appropriate screenings, and immunizations on an annual basis.      Jo-Ann Clark PA-C

## 2020-10-27 ENCOUNTER — OFFICE VISIT (OUTPATIENT)
Dept: GASTROENTEROLOGY | Facility: CLINIC | Age: 45
End: 2020-10-27

## 2020-10-27 VITALS
HEART RATE: 75 BPM | HEIGHT: 76 IN | WEIGHT: 315 LBS | TEMPERATURE: 96.6 F | BODY MASS INDEX: 38.36 KG/M2 | SYSTOLIC BLOOD PRESSURE: 129 MMHG | DIASTOLIC BLOOD PRESSURE: 68 MMHG | RESPIRATION RATE: 16 BRPM

## 2020-10-27 DIAGNOSIS — R19.4 CHANGE IN BOWEL HABITS: ICD-10-CM

## 2020-10-27 DIAGNOSIS — K76.0 NONALCOHOLIC FATTY LIVER DISEASE: ICD-10-CM

## 2020-10-27 DIAGNOSIS — Z86.010 PERSONAL HISTORY OF COLONIC POLYPS: ICD-10-CM

## 2020-10-27 DIAGNOSIS — D75.1 POLYCYTHEMIA: ICD-10-CM

## 2020-10-27 DIAGNOSIS — R11.2 NAUSEA AND VOMITING, INTRACTABILITY OF VOMITING NOT SPECIFIED, UNSPECIFIED VOMITING TYPE: ICD-10-CM

## 2020-10-27 DIAGNOSIS — K21.9 GASTROESOPHAGEAL REFLUX DISEASE WITHOUT ESOPHAGITIS: ICD-10-CM

## 2020-10-27 DIAGNOSIS — R79.89 ELEVATED LFTS: Primary | ICD-10-CM

## 2020-10-27 DIAGNOSIS — E66.01 MORBID OBESITY (HCC): ICD-10-CM

## 2020-10-27 DIAGNOSIS — R16.1 SPLENOMEGALY: ICD-10-CM

## 2020-10-27 PROCEDURE — 99204 OFFICE O/P NEW MOD 45 MIN: CPT | Performed by: INTERNAL MEDICINE

## 2020-10-27 RX ORDER — OMEPRAZOLE 40 MG/1
40 CAPSULE, DELAYED RELEASE ORAL DAILY
Qty: 30 CAPSULE | Refills: 2 | Status: SHIPPED | OUTPATIENT
Start: 2020-10-27 | End: 2021-01-21

## 2020-10-27 NOTE — PROGRESS NOTES
New Patient Consult      Date: 10/27/2020   Patient Name: Raul Welch  MRN: [id number]  : 1975     Referring Physician: Domi Strickland APRN    Chief Complaint   Patient presents with   • Liver Eval   • Splenomegaly       History of Present Illness: Raul Welch is a 45 y.o. male who is here today to establish care with Gastroenterology for evaluation of elevated liver enzymes, fatty liver disease, splenomegaly.   Had recent lab work done and the ultrasound of the abdomen which revealed fatty liver disease and also splenomegaly and he is here to get further work-up on that.     He has been having issues with intermittent nausea vomiting and diarrhea since about 6 moths. He will have BM anywhere 3-4 times per day. Loose to soft. Also has associated Left sided abdominal pain.   This patient deny any hematochezia or melena.  Weight is stable. Pt has nausea vomiting intermittently but no or odynophagia or dysphagia. There is a history of acid reflux,m takes OTC antacids, not helping him much. There is no history of anemia. No prior history of EGD. Last colonoscopy was , polyp removed . No family history of colon cancer or any GI malignancy. No history of any abdominal surgery. Denies alcohol abuse or cigarette smoking.     Subjective      Past Medical History:   Past Medical History:   Diagnosis Date   • Arthritis    • Asthma     SPOUSE REPORTS ISSUES AS A CHILD   • Back pain    • Bell's palsy    • Blood in stool    • Depression    • Diabetes (CMS/HCC)    • Diarrhea     WITH ABDOMINAL CRAMPING   • Diverticulitis    • Elevated cholesterol    • GERD (gastroesophageal reflux disease)    • Headache    • High cholesterol    • History of being tatooed    • History of bronchitis    • History of fracture     HX OF FOOT (SPOUSE UNSURE LATERALITY)   • History of gout    • Hypertension    • Myocardial infarction (CMS/HCC)    • Sleep apnea     USES BIPAP HS - TO BRING IN THE DOS   • Tattoos    •  Wears glasses        Past Surgical History:   Past Surgical History:   Procedure Laterality Date   • APPENDECTOMY     • CARDIAC CATHETERIZATION     • CARDIAC CATHETERIZATION N/A 2017    Procedure: Coronary angiography;  Surgeon: Zander Bobo MD;  Location: [facility] CATH INVASIVE LOCATION;  Service:    • COLONOSCOPY N/A 2018    Procedure: COLONOSCOPY with hot snare polypectomy,  cold snare polypectomy, cold biopsy polypectomies, and biopsies;  Surgeon: Sandro Potter MD;  Location: [facility] ENDOSCOPY;  Service: Gastroenterology   • KNEE SURGERY Left     removed infection/mass in knee   • OTHER SURGICAL HISTORY      SPOUSE REPORTS GROWTH REMOVED FROM THROAT WHEN PATIENT WAS A YOUNG CHILD       Family History:   Family History   Problem Relation Age of Onset   • Diabetes Mother    • Alcohol abuse Father    • Diabetes Father    • Heart disease Father    • Colon cancer Neg Hx    • Cirrhosis Neg Hx    • Liver cancer Neg Hx    • Liver disease Neg Hx        Social History:   Social History     Socioeconomic History   • Marital status:      Spouse name: Not on file   • Number of children: Not on file   • Years of education: Not on file   • Highest education level: Not on file   Tobacco Use   • Smoking status: Former Smoker     Packs/day: 0.25     Years: 0.50     Pack years: 0.12     Types: Cigarettes     Quit date:      Years since quittin.8   • Smokeless tobacco: Never Used   • Tobacco comment: SPOUSE REPORTS PATIENT SMOKED VERY BRIEFLY WITH NO ADDICTIVE HABIT AS A TEENAGER. QUIT OVER 25 YEARS AGO.   Substance and Sexual Activity   • Alcohol use: Never     Frequency: Never   • Drug use: Never   • Sexual activity: Defer         Current Outpatient Medications:   •  atorvastatin (Lipitor) 40 MG tablet, Take 1 tablet by mouth Daily., Disp: 30 tablet, Rfl: 6  •  B-D UF III MINI PEN NEEDLES 31G X 5 MM misc, USE ONCE DAILY WITH INSULIN INJECTIONS AS DIRECTED, Disp: , Rfl: 11  •  Blood Glucose  "Monitoring Suppl device, Use as directed to check blood sugar, Disp: 1 each, Rfl: 0  •  carvedilol (COREG) 25 MG tablet, Take 25 mg by mouth 2 (Two) Times a Day. Take with food, Disp: , Rfl:   •  cetirizine (ZyrTEC) 10 MG tablet, Take 12 mg by mouth Daily., Disp: , Rfl:   •  citalopram (CeleXA) 20 MG tablet, Take 20 mg by mouth Daily., Disp: , Rfl:   •  Ertugliflozin L-PyroglutamicAc (Steglatro) 15 MG tablet, Take 1 tablet by mouth Every Morning., Disp: 30 tablet, Rfl: 6  •  glucose blood test strip, Use as directed to check blood sugar, Disp: 100 each, Rfl: 11  •  glyburide (DIAbeta) 5 MG tablet, Take 2 tablets by mouth 2 (Two) Times a Day Before Meals., Disp: 120 tablet, Rfl: 6  •  hydrochlorothiazide (HYDRODIURIL) 25 MG tablet, Take 25 mg by mouth Daily., Disp: , Rfl:   •  icosapent ethyl (Vascepa) 1 g capsule capsule, Take 2 g by mouth 2 (Two) Times a Day With Meals., Disp: 120 capsule, Rfl: 3  •  Insulin Glargine (BASAGLAR KWIKPEN) 100 UNIT/ML injection pen, Inject 80 Units under the skin into the appropriate area as directed Every Night. Increase by 5 units every 3 days up to 80u daily, Disp: 8 pen, Rfl: 6  •  Insulin Pen Needle (PEN NEEDLES 1/2\") 29G X 12MM misc, Use as directed to inject insulin, Disp: 100 each, Rfl: 11  •  Insulin Pen Needle 32G X 6 MM misc, Use daily with insulin, Disp: 100 each, Rfl: 11  •  lisinopril (PRINIVIL,ZESTRIL) 40 MG tablet, Take 40 mg by mouth Daily., Disp: , Rfl:   •  loperamide (Imodium A-D) 2 MG tablet, Take 1 tablet by mouth 3 (Three) Times a Day As Needed for Diarrhea., Disp: 20 tablet, Rfl: 0  •  metFORMIN (GLUCOPHAGE) 1000 MG tablet, Take 1 tablet by mouth 2 (Two) Times a Day With Meals., Disp: 60 tablet, Rfl: 6  •  modafinil (PROVIGIL) 100 MG tablet, TAKE 1 TABLET BY MOUTH ONE TIME A DAY , Disp: 30 tablet, Rfl: 0  •  nystatin-triamcinolone (MYCOLOG) 405179-0.1 UNIT/GM-% ointment, Apply to affected area 2 times daily, Disp: 30 g, Rfl: 1  •  Omega 3 1200 MG capsule, Take " 1,000 mg by mouth 2 (Two) Times a Day., Disp: , Rfl:   •  ondansetron ODT (ZOFRAN-ODT) 4 MG disintegrating tablet, Place 1 tablet on the tongue Every 8 (Eight) Hours As Needed for Nausea or Vomiting., Disp: 12 tablet, Rfl: 0  •  Semaglutide,0.25 or 0.5MG/DOS, (Ozempic, 0.25 or 0.5 MG/DOSE,) 2 MG/1.5ML solution pen-injector, inject 0.5mg weekly as directed, Disp: 2 pen, Rfl: 3  •  ibuprofen (ADVIL,MOTRIN) 800 MG tablet, Take 1 tablet by mouth Every 8 (Eight) Hours As Needed for Mild Pain  or Moderate Pain ., Disp: 20 tablet, Rfl: 0    No Known Allergies    Review of Systems:   Review of Systems   Constitutional: Negative for appetite change, fatigue, fever and unexpected weight loss.   HENT: Positive for trouble swallowing.    Respiratory: Positive for shortness of breath. Negative for cough and wheezing.    Cardiovascular: Negative for chest pain, palpitations and leg swelling.   Gastrointestinal: Positive for abdominal distention, abdominal pain, diarrhea, nausea, rectal pain, vomiting, GERD and indigestion. Negative for anal bleeding, blood in stool and constipation.   Genitourinary: Negative for dysuria, frequency and hematuria.   Musculoskeletal: Positive for arthralgias and back pain. Negative for joint swelling.   Skin: Negative for color change, rash and skin lesions.   Neurological: Positive for headache. Negative for dizziness, syncope, speech difficulty, weakness and memory problem.   Hematological: Negative for adenopathy. Does not bruise/bleed easily.   Psychiatric/Behavioral: Negative for agitation, behavioral problems, suicidal ideas and depressed mood.       The following portions of the patient's history were reviewed and updated as appropriate: allergies, current medications, past family history, past medical history, past social history, past surgical history and problem list.    Objective     Physical Exam:  Vital Signs:   Vitals:    10/27/20 1531   BP: 129/68   Pulse: 75   Resp: 16   Temp: 96.6  "°F (35.9 °C)   Weight: (!) 171 kg (376 lb)   Height: 193 cm (76\")       Physical Exam  Vitals signs and nursing note reviewed.   Constitutional:       Appearance: Normal appearance. He is well-developed. He is obese.   HENT:      Head: Normocephalic and atraumatic.      Right Ear: External ear normal.      Left Ear: External ear normal.      Mouth/Throat:      Mouth: Mucous membranes are moist.      Pharynx: Oropharynx is clear.   Eyes:      Conjunctiva/sclera: Conjunctivae normal.      Pupils: Pupils are equal, round, and reactive to light.   Neck:      Musculoskeletal: Normal range of motion and neck supple.      Thyroid: No thyromegaly.      Trachea: No tracheal deviation.      Comments: Large fat pad in the neck  Cardiovascular:      Rate and Rhythm: Normal rate and regular rhythm.      Heart sounds: No murmur.   Pulmonary:      Effort: Pulmonary effort is normal. No respiratory distress.      Breath sounds: Normal breath sounds.   Abdominal:      General: Bowel sounds are normal. There is no distension.      Palpations: Abdomen is soft. There is no mass.      Tenderness: There is no abdominal tenderness (Minimal tenderness in the left upper quadrant).      Hernia: No hernia is present.      Comments: Fatty abdominal wall with minimal abdominal wall edema   Musculoskeletal: Normal range of motion.   Skin:     General: Skin is warm and dry.   Neurological:      General: No focal deficit present.      Mental Status: He is alert and oriented to person, place, and time.      Cranial Nerves: No cranial nerve deficit.      Sensory: No sensory deficit.   Psychiatric:         Mood and Affect: Mood normal.         Behavior: Behavior normal.         Thought Content: Thought content normal.         Judgment: Judgment normal.         Results Review:   I have reviewed the patient's new clinical and imaging results and agree with the interpretation.     Office Visit on 09/21/2020   Component Date Value Ref Range Status   • " Glucose 09/21/2020 113  70 - 130 mg/dL Final   • Hemoglobin A1C 09/21/2020 6.1  % Final      Us Abdomen Complete    Result Date: 9/15/2020  Fatty infiltration of the liver and splenomegaly.  This report was finalized on 9/15/2020 8:54 AM by Xuan Gaviria M.D..      Assessment / Plan      Assessment & Plan:  1. Elevated LFTs  2. Nonalcoholic fatty liver disease  3. Morbid obesity (CMS/HCC)  Patient has a borderline elevated liver enzymes with a normal total bilirubin normal alkaline phosphatase and normal platelets and albumin.  Recent ultrasound abdomen revealed fatty liver disease with splenomegaly suggesting development of portal hypertension.  He is morbidly obese metabolic syndrome  Some of his medication also elevated liver enzymes including the statins and Celexa.  He is nonalcoholic non-smoker.  This patient is extremely high risk for progression of the liver disease and cirrhosis.  He states that he Lost about 25 pouds last 6 months but he is nowhere near the target.  His recent ultrasound abdomen CT scan abdomen pelvis and recent lab works reviewed.  We had a question on Mediterranean diet  He also need basic liver work-up to rule out other cause of elevated liver enzymes  He also has a significant issues with obstructive sleep apnea , diabetes mellitus , joint pains, he will likely need bariatric surgery and weight reduction.   We will repeat up to bariatrics for possible gastric sleeve    4. Splenomegaly  5. Polycythemia  His splenomegaly appears to be secondary to mild portal hypertension from his fatty liver disease possible Grey.  However he also has a polycythemia with a hemoglobin of 18.9 g/dL.  I am not entirely clear whether there is any other hematological process going on causing polycythemia splenomegaly.   We will repeat CBC today and if it is still high will refer him to allergy /oncology    6. Nausea and vomiting, intractability of vomiting not specified, unspecified vomiting type  7.  Gastroesophageal reflux disease without esophagitis  8. Change in bowel habits  This is mostly associated with his diabetes mellitus.  He also has a history and clinical examination findings suggestive of irritable bowel syndrome with the diarrhea.   He always has left sided abdominal pain with the bloating.  This is associated with nausea and vomiting intermittently with the loose stools.   His Metformin and diabetes definitely contributing to his some of the symptoms.  He had a colonoscopy done in 2018 with random colon biopsies and terminal biopsies were normal.  He is managing these with the Zofran and he thinks that he does not need any medication at this time  His not taking any prescription medication for his acid reflux, states that over-the-counter antacids is not helping.  We will start him on Prilosec 40 mils p.o. daily and discontinue after 8 weeks if his symptoms improve  He may need an EGD if his symptoms does not improve    9. Personal history of colonic polyps  Colonoscopy done in 2018 revealed multiple colon polyps however only one of the polyp was tubular adenoma  He is due for surveillance colonoscopy in 2023      Follow Up:   No follow-ups on file.    Jhoana Snider MD  [facility]  10/27/2020  15:54 EDT    Please note that portions of this note may have been completed with a voice recognition program.

## 2020-10-27 NOTE — PATIENT INSTRUCTIONS
Mediterranean Diet  A Mediterranean diet refers to food and lifestyle choices that are based on the traditions of countries located on the [place]. This way of eating has been shown to help prevent certain conditions and improve outcomes for people who have chronic diseases, like kidney disease and heart disease.  What are tips for following this plan?  Lifestyle  · Cook and eat meals together with your family, when possible.  · Drink enough fluid to keep your urine clear or pale yellow.  · Be physically active every day. This includes:  ? Aerobic exercise like running or swimming.  ? Leisure activities like gardening, walking, or housework.  · Get 7-8 hours of sleep each night.  · If recommended by your health care provider, drink red wine in moderation. This means 1 glass a day for nonpregnant women and 2 glasses a day for men. A glass of wine equals 5 oz (150 mL).  Reading food labels    · Check the serving size of packaged foods. For foods such as rice and pasta, the serving size refers to the amount of cooked product, not dry.  · Check the total fat in packaged foods. Avoid foods that have saturated fat or trans fats.  · Check the ingredients list for added sugars, such as corn syrup.  Shopping  · At the grocery store, buy most of your food from the areas near the walls of the store. This includes:  ? Fresh fruits and vegetables (produce).  ? Grains, beans, nuts, and seeds. Some of these may be available in unpackaged forms or large amounts (in bulk).  ? Fresh seafood.  ? Poultry and eggs.  ? Low-fat dairy products.  · Buy whole ingredients instead of prepackaged foods.  · Buy fresh fruits and vegetables in-season from local farmers markets.  · Buy frozen fruits and vegetables in resealable bags.  · If you do not have access to quality fresh seafood, buy precooked frozen shrimp or canned fish, such as tuna, salmon, or sardines.  · Buy small amounts of raw or cooked vegetables, salads, or olives from  the deli or salad bar at your store.  · Stock your pantry so you always have certain foods on hand, such as olive oil, canned tuna, canned tomatoes, rice, pasta, and beans.  Cooking  · Cook foods with extra-virgin olive oil instead of using butter or other vegetable oils.  · Have meat as a side dish, and have vegetables or grains as your main dish. This means having meat in small portions or adding small amounts of meat to foods like pasta or stew.  · Use beans or vegetables instead of meat in common dishes like chili or lasagna.  · [place] with different cooking methods. Try roasting or broiling vegetables instead of steaming or sautéeing them.  · Add frozen vegetables to soups, stews, pasta, or rice.  · Add nuts or seeds for added healthy fat at each meal. You can add these to yogurt, salads, or vegetable dishes.  · Marinate fish or vegetables using olive oil, lemon juice, garlic, and fresh herbs.  Meal planning    · Plan to eat 1 vegetarian meal one day each week. Try to work up to 2 vegetarian meals, if possible.  · Eat seafood 2 or more times a week.  · Have healthy snacks readily available, such as:  ? Vegetable sticks with hummus.  ? Greek yogurt.  ? Fruit and nut trail mix.  · Eat balanced meals throughout the week. This includes:  ? Fruit: 2-3 servings a day  ? Vegetables: 4-5 servings a day  ? Low-fat dairy: 2 servings a day  ? Fish, poultry, or lean meat: 1 serving a day  ? Beans and legumes: 2 or more servings a week  ? Nuts and seeds: 1-2 servings a day  ? Whole grains: 6-8 servings a day  ? Extra-virgin olive oil: 3-4 servings a day  · Limit red meat and sweets to only a few servings a month  What are my food choices?  · Mediterranean diet  ? Recommended  § Grains: Whole-grain pasta. Brown rice. Bulgar wheat. Polenta. Couscous. Whole-wheat bread. Oatmeal. Quinoa.  § Vegetables: Artichokes. Beets. Broccoli. Cabbage. Carrots. Eggplant. Green beans. Chard. Kale. Spinach. Onions. Leeks. Peas. Squash.  Tomatoes. Peppers. Radishes.  § Fruits: Apples. Apricots. Avocado. Berries. Bananas. Cherries. Dates. Figs. Grapes. [place]. Melon. Oranges. Peaches. Plums. Pomegranate.  § Meats and other protein foods: Beans. Almonds. Sunflower seeds. Pine nuts. Peanuts. Cod. [place]. Scallops. Shrimp. Tuna. Tilapia. Clams. Oysters. Eggs.  § Dairy: Low-fat milk. Cheese. Greek yogurt.  § Beverages: Water. Red wine. Herbal tea.  § Fats and oils: Extra virgin olive oil. Avocado oil. Grape seed oil.  § Sweets and desserts: Greek yogurt with honey. Baked apples. Poached pears. Trail mix.  § Seasoning and other foods: Basil. Cilantro. Coriander. Cumin. Mint. Parsley. Pj. Rosemary. Tarragon. Garlic. Oregano. Thyme. Pepper. Balsalmic vinegar. Tahini. Hummus. Tomato sauce. Olives. Mushrooms.  ? Limit these  § Grains: Prepackaged pasta or rice dishes. Prepackaged cereal with added sugar.  § Vegetables: Deep fried potatoes (french fries).  § Fruits: Fruit canned in syrup.  § Meats and other protein foods: Beef. Pork. Lamb. Poultry with skin. Hot dogs. Cai.  § Dairy: Ice cream. Sour cream. Whole milk.  § Beverages: Juice. Sugar-sweetened soft drinks. Beer. Liquor and spirits.  § Fats and oils: Butter. Canola oil. Vegetable oil. Beef fat (tallow). Lard.  § Sweets and desserts: Cookies. Cakes. Pies. Candy.  § Seasoning and other foods: Mayonnaise. Premade sauces and marinades.  The items listed may not be a complete list. Talk with your dietitian about what dietary choices are right for you.  Summary  · The Mediterranean diet includes both food and lifestyle choices.  · Eat a variety of fresh fruits and vegetables, beans, nuts, seeds, and whole grains.  · Limit the amount of red meat and sweets that you eat.  · Talk with your health care provider about whether it is safe for you to drink red wine in moderation. This means 1 glass a day for nonpregnant women and 2 glasses a day for men. A glass of wine equals 5 oz (150 mL).  This information  is not intended to replace advice given to you by your health care provider. Make sure you discuss any questions you have with your health care provider.  Document Released: 08/10/2017 Document Revised: 08/17/2017 Document Reviewed: 08/10/2017  Elsevier Patient Education © 2020 [employer]

## 2020-10-28 ENCOUNTER — LAB (OUTPATIENT)
Dept: LAB | Facility: HOSPITAL | Age: 45
End: 2020-10-28

## 2020-10-28 DIAGNOSIS — R79.89 ELEVATED LFTS: ICD-10-CM

## 2020-10-28 LAB
ALBUMIN SERPL-MCNC: 4.2 G/DL (ref 3.5–5.2)
ALBUMIN/GLOB SERPL: 1.3 G/DL
ALP SERPL-CCNC: 89 U/L (ref 39–117)
ALPHA1 GLOB MFR UR ELPH: 147 MG/DL (ref 90–200)
ALT SERPL W P-5'-P-CCNC: 28 U/L (ref 1–41)
ANION GAP SERPL CALCULATED.3IONS-SCNC: 9.5 MMOL/L (ref 5–15)
AST SERPL-CCNC: 20 U/L (ref 1–40)
BASOPHILS # BLD AUTO: 0.04 10*3/MM3 (ref 0–0.2)
BASOPHILS NFR BLD AUTO: 0.5 % (ref 0–1.5)
BILIRUB SERPL-MCNC: 0.5 MG/DL (ref 0–1.2)
BUN SERPL-MCNC: 15 MG/DL (ref 6–20)
BUN/CREAT SERPL: 16.9 (ref 7–25)
CALCIUM SPEC-SCNC: 9.4 MG/DL (ref 8.6–10.5)
CERULOPLASMIN SERPL-MCNC: 28 MG/DL (ref 16–31)
CHLORIDE SERPL-SCNC: 104 MMOL/L (ref 98–107)
CO2 SERPL-SCNC: 25.5 MMOL/L (ref 22–29)
CREAT SERPL-MCNC: 0.89 MG/DL (ref 0.76–1.27)
DEPRECATED RDW RBC AUTO: 44.1 FL (ref 37–54)
EOSINOPHIL # BLD AUTO: 0.27 10*3/MM3 (ref 0–0.4)
EOSINOPHIL NFR BLD AUTO: 3.2 % (ref 0.3–6.2)
ERYTHROCYTE [DISTWIDTH] IN BLOOD BY AUTOMATED COUNT: 14.6 % (ref 12.3–15.4)
FERRITIN SERPL-MCNC: 257 NG/ML (ref 30–400)
GFR SERPL CREATININE-BSD FRML MDRD: 92 ML/MIN/1.73
GLOBULIN UR ELPH-MCNC: 3.2 GM/DL
GLUCOSE SERPL-MCNC: 102 MG/DL (ref 65–99)
HBV SURFACE AB SER RIA-ACNC: NORMAL
HBV SURFACE AG SERPL QL IA: NORMAL
HCT VFR BLD AUTO: 48.1 % (ref 37.5–51)
HCV AB SER DONR QL: NORMAL
HGB BLD-MCNC: 16.2 G/DL (ref 13–17.7)
IMM GRANULOCYTES # BLD AUTO: 0.05 10*3/MM3 (ref 0–0.05)
IMM GRANULOCYTES NFR BLD AUTO: 0.6 % (ref 0–0.5)
INR PPP: 1.04 (ref 0.9–1.1)
IRON 24H UR-MRATE: 57 MCG/DL (ref 59–158)
IRON SATN MFR SERPL: 17 % (ref 20–50)
LYMPHOCYTES # BLD AUTO: 1.85 10*3/MM3 (ref 0.7–3.1)
LYMPHOCYTES NFR BLD AUTO: 22.2 % (ref 19.6–45.3)
MCH RBC QN AUTO: 28.4 PG (ref 26.6–33)
MCHC RBC AUTO-ENTMCNC: 33.7 G/DL (ref 31.5–35.7)
MCV RBC AUTO: 84.2 FL (ref 79–97)
MONOCYTES # BLD AUTO: 0.95 10*3/MM3 (ref 0.1–0.9)
MONOCYTES NFR BLD AUTO: 11.4 % (ref 5–12)
NEUTROPHILS NFR BLD AUTO: 5.18 10*3/MM3 (ref 1.7–7)
NEUTROPHILS NFR BLD AUTO: 62.1 % (ref 42.7–76)
NRBC BLD AUTO-RTO: 0 /100 WBC (ref 0–0.2)
PLATELET # BLD AUTO: 224 10*3/MM3 (ref 140–450)
PMV BLD AUTO: 11 FL (ref 6–12)
POTASSIUM SERPL-SCNC: 4.1 MMOL/L (ref 3.5–5.2)
PROT SERPL-MCNC: 7.4 G/DL (ref 6–8.5)
PROTHROMBIN TIME: 14.1 SECONDS (ref 12–15.1)
RBC # BLD AUTO: 5.71 10*6/MM3 (ref 4.14–5.8)
SODIUM SERPL-SCNC: 139 MMOL/L (ref 136–145)
TIBC SERPL-MCNC: 338 MCG/DL (ref 298–536)
TRANSFERRIN SERPL-MCNC: 227 MG/DL (ref 200–360)
WBC # BLD AUTO: 8.34 10*3/MM3 (ref 3.4–10.8)

## 2020-10-28 PROCEDURE — 85025 COMPLETE CBC W/AUTO DIFF WBC: CPT

## 2020-10-28 PROCEDURE — 84478 ASSAY OF TRIGLYCERIDES: CPT

## 2020-10-28 PROCEDURE — 86708 HEPATITIS A ANTIBODY: CPT

## 2020-10-28 PROCEDURE — 82103 ALPHA-1-ANTITRYPSIN TOTAL: CPT

## 2020-10-28 PROCEDURE — 86704 HEP B CORE ANTIBODY TOTAL: CPT

## 2020-10-28 PROCEDURE — 36415 COLL VENOUS BLD VENIPUNCTURE: CPT

## 2020-10-28 PROCEDURE — 80053 COMPREHEN METABOLIC PANEL: CPT

## 2020-10-28 PROCEDURE — 82172 ASSAY OF APOLIPOPROTEIN: CPT

## 2020-10-28 PROCEDURE — 83540 ASSAY OF IRON: CPT

## 2020-10-28 PROCEDURE — 82247 BILIRUBIN TOTAL: CPT

## 2020-10-28 PROCEDURE — 82390 ASSAY OF CERULOPLASMIN: CPT

## 2020-10-28 PROCEDURE — 86803 HEPATITIS C AB TEST: CPT

## 2020-10-28 PROCEDURE — 86706 HEP B SURFACE ANTIBODY: CPT

## 2020-10-28 PROCEDURE — 82947 ASSAY GLUCOSE BLOOD QUANT: CPT

## 2020-10-28 PROCEDURE — 83516 IMMUNOASSAY NONANTIBODY: CPT

## 2020-10-28 PROCEDURE — 86038 ANTINUCLEAR ANTIBODIES: CPT

## 2020-10-28 PROCEDURE — 83010 ASSAY OF HAPTOGLOBIN QUANT: CPT

## 2020-10-28 PROCEDURE — 82728 ASSAY OF FERRITIN: CPT

## 2020-10-28 PROCEDURE — 85610 PROTHROMBIN TIME: CPT

## 2020-10-28 PROCEDURE — 84450 TRANSFERASE (AST) (SGOT): CPT

## 2020-10-28 PROCEDURE — 84460 ALANINE AMINO (ALT) (SGPT): CPT

## 2020-10-28 PROCEDURE — 87340 HEPATITIS B SURFACE AG IA: CPT

## 2020-10-28 PROCEDURE — 83883 ASSAY NEPHELOMETRY NOT SPEC: CPT

## 2020-10-28 PROCEDURE — 84466 ASSAY OF TRANSFERRIN: CPT

## 2020-10-28 PROCEDURE — 82977 ASSAY OF GGT: CPT

## 2020-10-28 PROCEDURE — 82465 ASSAY BLD/SERUM CHOLESTEROL: CPT

## 2020-10-29 LAB
ACTIN IGG SERPL-ACNC: 19 UNITS (ref 0–19)
ANA SER QL: NEGATIVE
HAV AB SER QL IA: NEGATIVE
HBV CORE AB SERPL QL IA: NEGATIVE
MITOCHONDRIA M2 IGG SER-ACNC: <20 UNITS (ref 0–20)

## 2020-10-31 LAB
A2 MACROGLOB SERPL-MCNC: 235 MG/DL (ref 110–276)
ALT SERPL W P-5'-P-CCNC: 31 IU/L (ref 0–55)
APO A-I SERPL-MCNC: 105 MG/DL (ref 101–178)
AST SERPL W P-5'-P-CCNC: 26 IU/L (ref 0–40)
BILIRUB SERPL-MCNC: 0.4 MG/DL (ref 0–1.2)
CHOLEST SERPL-MCNC: 98 MG/DL (ref 100–199)
FIBROSIS SCORING:: ABNORMAL
FIBROSIS STAGE SERPL QL: ABNORMAL
GGT SERPL-CCNC: 28 IU/L (ref 0–65)
GLUCOSE SERPL-MCNC: 109 MG/DL (ref 65–99)
HAPTOGLOB SERPL-MCNC: 237 MG/DL (ref 23–355)
INTERPRETATIONS: (REFERENCE): ABNORMAL
LABORATORY COMMENT REPORT: ABNORMAL
LIVER FIBR SCORE SERPL CALC.FIBROSURE: 0.26 (ref 0–0.21)
NASH SCORING (REFERENCE): ABNORMAL
NECROINFLAMMATORY ACT GRADE SERPL QL: ABNORMAL
NECROINFLAMMATORY ACT SCORE SERPL: 0.5
SERVICE CMNT-IMP: ABNORMAL
STEATOSIS GRADE (REFERENCE): ABNORMAL
STEATOSIS GRADING (REFERENCE): ABNORMAL
STEATOSIS SCORE (REFERENCE): 0.58 (ref 0–0.3)
TRIGL SERPL-MCNC: 164 MG/DL (ref 0–149)

## 2020-12-15 ENCOUNTER — OFFICE VISIT (OUTPATIENT)
Dept: NEUROLOGY | Facility: CLINIC | Age: 45
End: 2020-12-15

## 2020-12-15 VITALS
HEART RATE: 87 BPM | BODY MASS INDEX: 38.36 KG/M2 | DIASTOLIC BLOOD PRESSURE: 78 MMHG | WEIGHT: 315 LBS | SYSTOLIC BLOOD PRESSURE: 126 MMHG | RESPIRATION RATE: 18 BRPM | HEIGHT: 76 IN | OXYGEN SATURATION: 99 % | TEMPERATURE: 96.9 F

## 2020-12-15 DIAGNOSIS — G47.33 OSA TREATED WITH BIPAP: Primary | ICD-10-CM

## 2020-12-15 DIAGNOSIS — I10 ESSENTIAL HYPERTENSION: ICD-10-CM

## 2020-12-15 DIAGNOSIS — E78.5 DYSLIPIDEMIA: ICD-10-CM

## 2020-12-15 PROCEDURE — 99213 OFFICE O/P EST LOW 20 MIN: CPT | Performed by: NURSE PRACTITIONER

## 2020-12-15 NOTE — PROGRESS NOTES
New Sleep Patient Office Visit      Patient Name: Raul Welch  : 1975   MRN: [id number]     Referring Physician: No ref. provider found    Chief Complaint:    Chief Complaint   Patient presents with   • Sleep Apnea     follow up       History of Present Illness: Raul Welch is a 45 y.o. male who is here today to follow up for GONZÁLEZ.  He was seen in 2019 by Latonya YIN.  Currently on AutoBiPAP max IPAP 25cm and min EPAP 15cm, AHI 1.6/hour, compliance 96.7%.  He is tolerating his pressures well.  He says he is currently using a loaner machine because his machine stopped working.  He may need an order for a new machine but believes his old machine is only 3 years old.  He is using a nasal mask.  He uses [employer].  Additional risk factors- BMI 45, HTN, dyslipidemia, diabetes type 2, GERD.     Pertinent Medical History:   Current compliance report reviewed and has been scanned into the patient's medical record.    Subjective      Review of Systems:   Review of Systems   Constitutional: Negative for chills, fatigue and fever.   HENT: Negative for facial swelling, hearing loss, sore throat, tinnitus and trouble swallowing.    Eyes: Negative for blurred vision, double vision, photophobia and visual disturbance.   Respiratory: Positive for apnea. Negative for cough, chest tightness and shortness of breath.    Cardiovascular: Negative for chest pain, palpitations and leg swelling.   Gastrointestinal: Negative for abdominal pain, nausea and vomiting.   Endocrine: Negative for cold intolerance and heat intolerance.   Musculoskeletal: Negative for gait problem, neck pain and neck stiffness.   Skin: Negative for color change and rash.   Allergic/Immunologic: Negative for environmental allergies and food allergies.   Neurological: Negative for dizziness, syncope, speech difficulty, weakness, light-headedness, numbness, headache and memory problem.    Psychiatric/Behavioral: Negative for behavioral problems, sleep disturbance and depressed mood. The patient is not nervous/anxious.        Past Medical History:   Past Medical History:   Diagnosis Date   • Arthritis    • Asthma     SPOUSE REPORTS ISSUES AS A CHILD   • Back pain    • Bell's palsy    • Blood in stool    • Depression    • Diabetes (CMS/HCC)    • Diarrhea     WITH ABDOMINAL CRAMPING   • Diverticulitis    • Elevated cholesterol    • GERD (gastroesophageal reflux disease)    • Headache    • High cholesterol    • History of being tatooed    • History of bronchitis    • History of fracture     HX OF FOOT (SPOUSE UNSURE LATERALITY)   • History of gout    • Hypertension    • Myocardial infarction (CMS/HCC) 1997   • Sleep apnea     USES BIPAP HS - TO BRING IN THE DOS   • Tattoos    • Wears glasses        Past Surgical History:   Past Surgical History:   Procedure Laterality Date   • APPENDECTOMY  1990   • CARDIAC CATHETERIZATION     • CARDIAC CATHETERIZATION N/A 9/30/2017    Procedure: Coronary angiography;  Surgeon: Zander Bobo MD;  Location: [facility] CATH INVASIVE LOCATION;  Service:    • COLONOSCOPY N/A 6/25/2018    Procedure: COLONOSCOPY with hot snare polypectomy,  cold snare polypectomy, cold biopsy polypectomies, and biopsies;  Surgeon: Sandro Potter MD;  Location: [facility] ENDOSCOPY;  Service: Gastroenterology   • KNEE SURGERY Left     removed infection/mass in knee   • OTHER SURGICAL HISTORY      SPOUSE REPORTS GROWTH REMOVED FROM THROAT WHEN PATIENT WAS A YOUNG CHILD       Family History:   Family History   Problem Relation Age of Onset   • Diabetes Mother    • Alcohol abuse Father    • Diabetes Father    • Heart disease Father    • Colon cancer Neg Hx    • Cirrhosis Neg Hx    • Liver cancer Neg Hx    • Liver disease Neg Hx        Social History:   Social History     Socioeconomic History   • Marital status:      Spouse name: Not on file   • Number of children: Not on file   • Years of  education: Not on file   • Highest education level: Not on file   Tobacco Use   • Smoking status: Former Smoker     Packs/day: 0.25     Years: 0.50     Pack years: 0.12     Types: Cigarettes     Quit date:      Years since quittin.9   • Smokeless tobacco: Never Used   • Tobacco comment: SPOUSE REPORTS PATIENT SMOKED VERY BRIEFLY WITH NO ADDICTIVE HABIT AS A TEENAGER. QUIT OVER 25 YEARS AGO.   Substance and Sexual Activity   • Alcohol use: Never     Frequency: Never   • Drug use: Never   • Sexual activity: Defer       Medications:     Current Outpatient Medications:   •  atorvastatin (Lipitor) 40 MG tablet, Take 1 tablet by mouth Daily., Disp: 30 tablet, Rfl: 6  •  B-D UF III MINI PEN NEEDLES 31G X 5 MM misc, USE ONCE DAILY WITH INSULIN INJECTIONS AS DIRECTED, Disp: , Rfl: 11  •  Blood Glucose Monitoring Suppl device, Use as directed to check blood sugar, Disp: 1 each, Rfl: 0  •  carvedilol (COREG) 25 MG tablet, Take 25 mg by mouth 2 (Two) Times a Day. Take with food, Disp: , Rfl:   •  cetirizine (ZyrTEC) 10 MG tablet, Take 12 mg by mouth Daily., Disp: , Rfl:   •  citalopram (CeleXA) 20 MG tablet, Take 20 mg by mouth Daily., Disp: , Rfl:   •  Ertugliflozin L-PyroglutamicAc (Steglatro) 15 MG tablet, Take 1 tablet by mouth Every Morning., Disp: 30 tablet, Rfl: 6  •  glucose blood test strip, Use as directed to check blood sugar, Disp: 100 each, Rfl: 11  •  glyburide (DIAbeta) 5 MG tablet, Take 2 tablets by mouth 2 (Two) Times a Day Before Meals., Disp: 120 tablet, Rfl: 6  •  hydrochlorothiazide (HYDRODIURIL) 25 MG tablet, Take 25 mg by mouth Daily., Disp: , Rfl:   •  ibuprofen (ADVIL,MOTRIN) 800 MG tablet, Take 1 tablet by mouth Every 8 (Eight) Hours As Needed for Mild Pain  or Moderate Pain ., Disp: 20 tablet, Rfl: 0  •  icosapent ethyl (Vascepa) 1 g capsule capsule, Take 2 g by mouth 2 (Two) Times a Day With Meals., Disp: 120 capsule, Rfl: 3  •  Insulin Glargine (BASAGLAR KWIKPEN) 100 UNIT/ML injection pen,  "Inject 80 Units under the skin into the appropriate area as directed Every Night. Increase by 5 units every 3 days up to 80u daily, Disp: 8 pen, Rfl: 6  •  Insulin Pen Needle (PEN NEEDLES 1/2\") 29G X 12MM misc, Use as directed to inject insulin, Disp: 100 each, Rfl: 11  •  Insulin Pen Needle 32G X 6 MM misc, Use daily with insulin, Disp: 100 each, Rfl: 11  •  lisinopril (PRINIVIL,ZESTRIL) 40 MG tablet, Take 40 mg by mouth Daily., Disp: , Rfl:   •  loperamide (Imodium A-D) 2 MG tablet, Take 1 tablet by mouth 3 (Three) Times a Day As Needed for Diarrhea., Disp: 20 tablet, Rfl: 0  •  metFORMIN (GLUCOPHAGE) 1000 MG tablet, Take 1 tablet by mouth 2 (Two) Times a Day With Meals., Disp: 60 tablet, Rfl: 6  •  modafinil (PROVIGIL) 100 MG tablet, TAKE 1 TABLET BY MOUTH ONE TIME A DAY , Disp: 30 tablet, Rfl: 0  •  nystatin-triamcinolone (MYCOLOG) 821915-6.1 UNIT/GM-% ointment, Apply to affected area 2 times daily, Disp: 30 g, Rfl: 1  •  Omega 3 1200 MG capsule, Take 1,000 mg by mouth 2 (Two) Times a Day., Disp: , Rfl:   •  omeprazole (priLOSEC) 40 MG capsule, Take 1 capsule by mouth Daily., Disp: 30 capsule, Rfl: 2  •  ondansetron ODT (ZOFRAN-ODT) 4 MG disintegrating tablet, Place 1 tablet on the tongue Every 8 (Eight) Hours As Needed for Nausea or Vomiting., Disp: 12 tablet, Rfl: 0  •  Semaglutide,0.25 or 0.5MG/DOS, (Ozempic, 0.25 or 0.5 MG/DOSE,) 2 MG/1.5ML solution pen-injector, inject 0.5mg weekly as directed, Disp: 2 pen, Rfl: 3    Allergies:   No Known Allergies    Objective     Physical Exam:  Vital Signs:   Vitals:    12/15/20 1528   BP: 126/78   Pulse: 87   Resp: 18   Temp: 96.9 °F (36.1 °C)   SpO2: 99%   Weight: (!) 170 kg (374 lb)   Height: 193 cm (76\")     BMI: Body mass index is 45.52 kg/m².    Physical Exam  Vitals signs and nursing note reviewed.   Constitutional:       General: He is not in acute distress.     Appearance: He is well-developed. He is obese. He is not diaphoretic.   HENT:      Head: Normocephalic " and atraumatic.      Comments: Mallampati 4  Eyes:      Conjunctiva/sclera: Conjunctivae normal.      Pupils: Pupils are equal, round, and reactive to light.   Neck:      Musculoskeletal: Neck supple.      Thyroid: No thyroid mass or thyromegaly.      Vascular: Normal carotid pulses.      Trachea: Trachea normal.   Cardiovascular:      Rate and Rhythm: Normal rate and regular rhythm.      Heart sounds: Normal heart sounds. No murmur. No friction rub. No gallop.    Pulmonary:      Effort: Pulmonary effort is normal. No respiratory distress.      Breath sounds: Normal breath sounds. No wheezing or rales.   Musculoskeletal: Normal range of motion.   Skin:     General: Skin is warm and dry.      Findings: No rash.   Neurological:      Mental Status: He is alert and oriented to person, place, and time.   Psychiatric:         Behavior: Behavior normal.         Thought Content: Thought content normal.         Assessment / Plan      Assessment/Plan:   Diagnoses and all orders for this visit:    1. GONZÁLEZ treated with BiPAP (Primary)  - Order for supplies sent to [facility]. Will write an order for a new Bipap machine if patient calls back and says he needs one. I have advised him he will need to be seen within 60-90 days of getting a new machine.   - Advised patient to avoid driving if drowsy.   - Encouraged weight loss with a BMI goal of 24.     2. Dyslipidemia    3. Essential hypertension    4. BMI 45.0-49.9, adult (CMS/[facility])         Follow Up:   Return in about 1 year (around 12/15/2021) for F/U Obstructive Sleep Apnea.    I have advised the patient the need to continue the use of CPAP.  Gold standard for treatment of sleep apnea includes weight loss, use of cpap and avoidance of alcohol.  Untreated GONZÁLEZ may increase the risk for development of hypertension, stroke, myocardial infarction, diabetes, cardiovascular disease, work-related issues and driving accidents. I have counseled and advised the patient to avoid  driving or operating heavy/dangerous equipment if feeling drowsy.     HUMPHREY Michele, FNP-C  [facility]       Please note that portions of this note may have been completed with a voice recognition program. Efforts were made to edit the dictations, but occasionally words are mistranscribed.

## 2020-12-16 ENCOUNTER — TELEPHONE (OUTPATIENT)
Dept: NEUROLOGY | Facility: CLINIC | Age: 45
End: 2020-12-16

## 2020-12-16 NOTE — TELEPHONE ENCOUNTER
1035- Patient dressed and resting in recliner chair.  1047-D/Santy to care of family post uneventful stay in PACU 2.   PT CALLED IN TODAY STATING HE FORGOT TO TELL CHINTAN THAT HE NEEDS AN ORDER SENT OVER TO [facility] FOR A NEW BIPAP MACHINE. OV NOTE STATES CHINTAN WILL WRITE AN ORDER IF PT CALLS BACK STATING HE NEEDS ONE. THEIR FAX NUMBER [phone].  HE STATES THE ORDER MUST HAVE THE NPI NUMBER OF THE PROVIDER, SETTINGS FOR MACHINE, AND A NOTE STATING HIS IS BROKEN AND HE IS BENEFITING FROM USING THE MACHINE.        CALL BACK- [phone]

## 2020-12-25 DIAGNOSIS — E78.2 MIXED HYPERLIPIDEMIA: ICD-10-CM

## 2020-12-28 RX ORDER — ICOSAPENT ETHYL 1000 MG/1
CAPSULE ORAL
Qty: 120 CAPSULE | Refills: 6 | Status: SHIPPED | OUTPATIENT
Start: 2020-12-28 | End: 2021-09-15 | Stop reason: SDUPTHER

## 2020-12-30 ENCOUNTER — PRIOR AUTHORIZATION (OUTPATIENT)
Dept: ENDOCRINOLOGY | Facility: CLINIC | Age: 45
End: 2020-12-30

## 2020-12-30 DIAGNOSIS — E78.2 MIXED HYPERLIPIDEMIA: ICD-10-CM

## 2021-01-05 RX ORDER — ICOSAPENT ETHYL 1000 MG/1
CAPSULE ORAL
Qty: 120 CAPSULE | Refills: 6 | Status: CANCELLED | OUTPATIENT
Start: 2021-01-05

## 2021-01-21 RX ORDER — OMEPRAZOLE 40 MG/1
CAPSULE, DELAYED RELEASE ORAL
Qty: 30 CAPSULE | Refills: 0 | Status: SHIPPED | OUTPATIENT
Start: 2021-01-21 | End: 2021-02-01

## 2021-01-22 ENCOUNTER — OFFICE VISIT (OUTPATIENT)
Dept: ENDOCRINOLOGY | Facility: CLINIC | Age: 46
End: 2021-01-22

## 2021-01-22 VITALS
WEIGHT: 315 LBS | SYSTOLIC BLOOD PRESSURE: 124 MMHG | DIASTOLIC BLOOD PRESSURE: 78 MMHG | BODY MASS INDEX: 45.09 KG/M2 | HEART RATE: 82 BPM | OXYGEN SATURATION: 98 %

## 2021-01-22 DIAGNOSIS — E78.2 MIXED HYPERLIPIDEMIA: Chronic | ICD-10-CM

## 2021-01-22 DIAGNOSIS — E11.65 UNCONTROLLED TYPE 2 DIABETES MELLITUS WITH HYPERGLYCEMIA (HCC): Primary | ICD-10-CM

## 2021-01-22 LAB
GLUCOSE BLDC GLUCOMTR-MCNC: 132 MG/DL (ref 70–130)
HBA1C MFR BLD: 6.3 %

## 2021-01-22 PROCEDURE — 82947 ASSAY GLUCOSE BLOOD QUANT: CPT | Performed by: PHYSICIAN ASSISTANT

## 2021-01-22 PROCEDURE — 99214 OFFICE O/P EST MOD 30 MIN: CPT | Performed by: PHYSICIAN ASSISTANT

## 2021-01-22 PROCEDURE — 83036 HEMOGLOBIN GLYCOSYLATED A1C: CPT | Performed by: PHYSICIAN ASSISTANT

## 2021-01-22 RX ORDER — SEMAGLUTIDE 1.34 MG/ML
INJECTION, SOLUTION SUBCUTANEOUS
Qty: 1.5 ML | Refills: 6 | Status: SHIPPED | OUTPATIENT
Start: 2021-01-22 | End: 2021-05-10 | Stop reason: CLARIF

## 2021-01-22 NOTE — PROGRESS NOTES
"Chief Complaint  F/u for Diabetes Mellitus.    HPI   Raul Welch is a 45 y.o. male who is here today for f/u of Diabetes Mellitus type 2. The initial diagnosis of diabetes was made in his 20s.     Has lost another 6# since last visit.     A1C- 6.3 (1/22/2021), 6.1 (9/21/2020), 9 (6/10/2020), > 11 (10/30/19), 10.3 (9/5/19)    Diabetic complications: peripheral neuropathy  Eye exam current (within one year): due  Foot care and dental care: discussed    Current diabetic medications include:  Metformin 1000mg BID  Glyburide 5mg 2 tab BID AC  Steglatro 15mg daily  Ozempic 0.5mg sc weekly - tolerating well  Basaglar 80u qhs    Statin: lipitor 40, changed from zocor 20 6/2020, vascepa started 6/2020    Past medications: none    Diabetic Monitoring  -   Meter readings reviewed- 4 readings on his meter in the last 2 weeks- 158, 125, 130, 116  No hypoglycemia on meter download  He denies hypoglycemia since last visit    Freestyle Jose was expensive.     Nutrition:     Current diet: in general, an \"unhealthy\" diet, on average, 2 meals per day, lunch and dinner. No sugared drinks.   Current exercise: none    The following portions of the patient's history were reviewed and updated by me as appropriate: allergies, current medications, past family history, past social history, past surgical history and problem list.      Past Medical History:   Diagnosis Date   • Arthritis    • Asthma     SPOUSE REPORTS ISSUES AS A CHILD   • Back pain    • Bell's palsy    • Blood in stool    • Depression    • Diabetes (CMS/[facility])    • Diarrhea     WITH ABDOMINAL CRAMPING   • Diverticulitis    • Elevated cholesterol    • GERD (gastroesophageal reflux disease)    • Headache    • High cholesterol    • History of being tatooed    • History of bronchitis    • History of fracture     HX OF FOOT (SPOUSE UNSURE LATERALITY)   • History of gout    • Hypertension    • Myocardial infarction (CMS/[facility]) 1997   • Sleep apnea     USES BIPAP HS - TO BRING IN " "THE DOS   • Tattoos    • Wears glasses        Medications    Current Outpatient Medications:   •  atorvastatin (Lipitor) 40 MG tablet, Take 1 tablet by mouth Daily., Disp: 30 tablet, Rfl: 6  •  B-D UF III MINI PEN NEEDLES 31G X 5 MM misc, USE ONCE DAILY WITH INSULIN INJECTIONS AS DIRECTED, Disp: , Rfl: 11  •  Blood Glucose Monitoring Suppl device, Use as directed to check blood sugar, Disp: 1 each, Rfl: 0  •  carvedilol (COREG) 25 MG tablet, Take 25 mg by mouth 2 (Two) Times a Day. Take with food, Disp: , Rfl:   •  cetirizine (ZyrTEC) 10 MG tablet, Take 12 mg by mouth Daily., Disp: , Rfl:   •  citalopram (CeleXA) 20 MG tablet, Take 20 mg by mouth Daily., Disp: , Rfl:   •  Ertugliflozin L-PyroglutamicAc (Steglatro) 15 MG tablet, Take 1 tablet by mouth Every Morning., Disp: 30 tablet, Rfl: 6  •  glucose blood test strip, Use as directed to check blood sugar, Disp: 100 each, Rfl: 11  •  glyburide (DIAbeta) 5 MG tablet, Take 2 tablets by mouth 2 (Two) Times a Day Before Meals., Disp: 120 tablet, Rfl: 6  •  hydrochlorothiazide (HYDRODIURIL) 25 MG tablet, Take 25 mg by mouth Daily., Disp: , Rfl:   •  ibuprofen (ADVIL,MOTRIN) 800 MG tablet, Take 1 tablet by mouth Every 8 (Eight) Hours As Needed for Mild Pain  or Moderate Pain ., Disp: 20 tablet, Rfl: 0  •  Insulin Glargine (BASAGLAR KWIKPEN) 100 UNIT/ML injection pen, Inject 80 Units under the skin into the appropriate area as directed Every Night. Increase by 5 units every 3 days up to 80u daily, Disp: 8 pen, Rfl: 6  •  Insulin Pen Needle (PEN NEEDLES 1/2\") 29G X 12MM misc, Use as directed to inject insulin, Disp: 100 each, Rfl: 11  •  Insulin Pen Needle 32G X 6 MM misc, Use daily with insulin, Disp: 100 each, Rfl: 11  •  lisinopril (PRINIVIL,ZESTRIL) 40 MG tablet, Take 40 mg by mouth Daily., Disp: , Rfl:   •  loperamide (Imodium A-D) 2 MG tablet, Take 1 tablet by mouth 3 (Three) Times a Day As Needed for Diarrhea., Disp: 20 tablet, Rfl: 0  •  metFORMIN (GLUCOPHAGE) 1000 " MG tablet, Take 1 tablet by mouth 2 (Two) Times a Day With Meals., Disp: 60 tablet, Rfl: 6  •  modafinil (PROVIGIL) 100 MG tablet, TAKE 1 TABLET BY MOUTH ONE TIME A DAY , Disp: 30 tablet, Rfl: 0  •  nystatin-triamcinolone (MYCOLOG) 790904-5.1 UNIT/GM-% ointment, Apply to affected area 2 times daily, Disp: 30 g, Rfl: 1  •  Omega 3 1200 MG capsule, Take 1,000 mg by mouth 2 (Two) Times a Day., Disp: , Rfl:   •  omeprazole (priLOSEC) 40 MG capsule, TAKE 1 CAPSULE BY MOUTH EVERY DAY , Disp: 30 capsule, Rfl: 0  •  ondansetron ODT (ZOFRAN-ODT) 4 MG disintegrating tablet, Place 1 tablet on the tongue Every 8 (Eight) Hours As Needed for Nausea or Vomiting., Disp: 12 tablet, Rfl: 0  •  Semaglutide,0.25 or 0.5MG/DOS, (Ozempic, 0.25 or 0.5 MG/DOSE,) 2 MG/1.5ML solution pen-injector, inject 0.5mg weekly as directed, Disp: 1.5 mL, Rfl: 6  •  Vascepa 1 g capsule capsule, TAKE 2 CAPSULES BY MOUTH TWO TIMES A DAY WITH MEALS, Disp: 120 capsule, Rfl: 6    Review of Systems  Review of Systems   Constitutional: Negative for appetite change, chills, fatigue, fever and unexpected weight change.   HENT: Negative for ear discharge, ear pain, hearing loss, nosebleeds, rhinorrhea and sore throat.    Eyes: Negative for pain, redness and visual disturbance.   Respiratory: Negative for cough, shortness of breath and wheezing.    Cardiovascular: Negative for chest pain, palpitations and leg swelling.   Gastrointestinal: Negative for abdominal pain, blood in stool, constipation, diarrhea, nausea and vomiting.   Endocrine: Negative for cold intolerance, heat intolerance and polydipsia.   Genitourinary: Negative for difficulty urinating, discharge, dysuria, hematuria, penile pain, penile swelling, scrotal swelling, testicular pain and urgency.   Musculoskeletal: Positive for arthralgias and myalgias. Negative for gait problem and joint swelling.   Skin: Negative for rash.   Allergic/Immunologic: Negative.    Neurological: Negative for dizziness,  syncope, weakness, numbness and headaches.   Hematological: Negative for adenopathy. Does not bruise/bleed easily.   Psychiatric/Behavioral: Negative for sleep disturbance and suicidal ideas. The patient is not nervous/anxious.         Physical Exam    /78   Pulse 82   Wt (!) 168 kg (370 lb 6.4 oz)   SpO2 98%   BMI 45.09 kg/m² Body mass index is 45.09 kg/m².  Physical Exam   Constitutional: He is oriented to person, place, and time. He appears well-developed. No distress.   HENT:   Head: Normocephalic.   Right Ear: External ear normal.   Left Ear: External ear normal.   Nose: Nose normal.   Eyes: Conjunctivae are normal. Right eye exhibits no discharge. Left eye exhibits no discharge. No scleral icterus.   Neck: Neck supple. No JVD present. No tracheal deviation present. No thyromegaly present.   Cardiovascular: Normal rate, regular rhythm and normal heart sounds.   No murmur heard.  Pulmonary/Chest: Effort normal and breath sounds normal. No respiratory distress. He has no wheezes.   Abdominal: Soft. Bowel sounds are normal. There is no abdominal tenderness.   Musculoskeletal: No tenderness.    Raul had a diabetic foot exam performed today.   During the foot exam he had a monofilament test performed.    Neurological Sensory Findings - Altered hot/cold right ankle/foot discrimination and altered hot/cold left ankle/foot discrimination. Altered sharp/dull right ankle/foot discrimination and altered sharp/dull left ankle/foot discrimination. Right ankle/foot altered proprioception and left ankle/foot altered proprioception.  Vascular Status -  His right foot exhibits normal foot vasculature  and no edema. His left foot exhibits normal foot vasculature  and no edema.  Skin Integrity  -  His right foot skin is intact. He has right foot onychomycosis.  He has no right foot ulcer and non-callous right foot.His left foot skin is intact.He has left foot onychomycosis. He has no left foot ulcer and non-callous  left foot..  Neurological: He is alert and oriented to person, place, and time.   Skin: Skin is warm and dry. No rash noted. He is not diaphoretic. No erythema.   Psychiatric: His behavior is normal. Judgment and thought content normal.       Labs and Imaging   Lab Results   Component Value Date    HGBA1C 6.3 01/22/2021    HGBA1C 6.1 09/21/2020    HGBA1C 9.0 06/10/2020     Office Visit on 01/22/2021   Component Date Value Ref Range Status   • Hemoglobin A1C 01/22/2021 6.3  % Final   • Glucose 01/22/2021 132* 70 - 130 mg/dL Final     No images are attached to the encounter or orders placed in the encounter.  No Images in the past 120 days found..    Assessment / Plan   Diagnoses and all orders for this visit:    1. Uncontrolled type 2 diabetes mellitus with hyperglycemia (CMS/HCC) (Primary)  -     POC Glycosylated Hemoglobin (Hb A1C)  -     POC Glucose, Blood  -     Comprehensive Metabolic Panel  -     Lipid Panel  -     TSH  -     Semaglutide,0.25 or 0.5MG/DOS, (Ozempic, 0.25 or 0.5 MG/DOSE,) 2 MG/1.5ML solution pen-injector; inject 0.5mg weekly as directed  Dispense: 1.5 mL; Refill: 6    2. Mixed hyperlipidemia  -     Comprehensive Metabolic Panel  -     Lipid Panel        Diabetes Mellitus 2 is under good control  -A1c 6.3  -no hypoglycemia  -continue basaglar 80 units daily  -continue ozempic 0.5mg sc weekly  -continue steglatro 15mg daily   -continue glyburide 5mg 2 tab BID AC  -cont metformin 1000mg BID  -eye exam due - encouraged to have done      1.  Diet: 3-4 carb servings per meal for females, 4-5 carb servings per meal for males  Spread carb intake throughout the day  Increase lean protein and vegetable intake  Avoid sugary drinks and processed carbs including crackers, cookies, cakes  2.  Exercise: Recommend at least 30 minutes of exercise daily, at least 5 days per week. Increase exercise gradually.   3.  Blood Glucose Goal: Blood glucose goal <150 fasting, <180 2 hr postprandial  4.  Microalbumin due  6/2021  5.  Education performed during this visit: long term diabetic complications discussed. , annual eye examinations at Ophthalmology discussed, dental hygiene discussed  and foot care reviewed., home glucose monitoring emphasized, all medications, side effects and compliance discussed carefully and Hypoglycemia management and prevention reviewed. Reviewed ‘ABCs’ of diabetes management (respective goals in parentheses):  A1C (<7), blood pressure (<130/80), and cholesterol (LDL <100, if CVD <70).      Mixed hyperlipidemia  -check lipids- he will come back for fasting labs  -continue lipitor 40mg qhs, vacepa started 6/2020    There are no Patient Instructions on file for this visit.    Follow up: Return in about 4 months (around 5/22/2021).    Discussed the nature of the disease including, risks, complications, implications, management, safe and proper use of medications. Encouraged therapeutic lifestyle changes including low calorie diet with plenty of fruits and vegetables, daily exercise, medication compliance, and keeping scheduled follow up appointments with me and any other providers. Encouraged patient to have appointment for complete physical, fasting labs, appropriate screenings, and immunizations on an annual basis.      Jo-Ann Clark PA-C

## 2021-01-26 ENCOUNTER — HOSPITAL ENCOUNTER (EMERGENCY)
Facility: HOSPITAL | Age: 46
Discharge: HOME | End: 2021-01-26
Attending: EMERGENCY MEDICINE | Admitting: EMERGENCY MEDICINE

## 2021-01-26 ENCOUNTER — APPOINTMENT (OUTPATIENT)
Dept: GENERAL RADIOLOGY | Facility: HOSPITAL | Age: 46
End: 2021-01-26

## 2021-01-26 VITALS
HEART RATE: 68 BPM | RESPIRATION RATE: 17 BRPM | DIASTOLIC BLOOD PRESSURE: 81 MMHG | HEIGHT: 76 IN | SYSTOLIC BLOOD PRESSURE: 128 MMHG | BODY MASS INDEX: 38.36 KG/M2 | TEMPERATURE: 97.6 F | OXYGEN SATURATION: 98 % | WEIGHT: 315 LBS

## 2021-01-26 DIAGNOSIS — R07.9 CHEST PAIN, UNSPECIFIED TYPE: Primary | ICD-10-CM

## 2021-01-26 LAB
ALBUMIN SERPL-MCNC: 4.1 G/DL (ref 3.5–5.2)
ALBUMIN/GLOB SERPL: 1.6 G/DL
ALP SERPL-CCNC: 97 U/L (ref 39–117)
ALT SERPL W P-5'-P-CCNC: 23 U/L (ref 1–41)
ANION GAP SERPL CALCULATED.3IONS-SCNC: 11.3 MMOL/L (ref 5–15)
AST SERPL-CCNC: 17 U/L (ref 1–40)
BASOPHILS # BLD AUTO: 0.04 10*3/MM3 (ref 0–0.2)
BASOPHILS NFR BLD AUTO: 0.4 % (ref 0–1.5)
BILIRUB SERPL-MCNC: 0.4 MG/DL (ref 0–1.2)
BUN SERPL-MCNC: 18 MG/DL (ref 6–20)
BUN/CREAT SERPL: 16.1 (ref 7–25)
CALCIUM SPEC-SCNC: 9 MG/DL (ref 8.6–10.5)
CHLORIDE SERPL-SCNC: 102 MMOL/L (ref 98–107)
CO2 SERPL-SCNC: 23.7 MMOL/L (ref 22–29)
CREAT SERPL-MCNC: 1.12 MG/DL (ref 0.76–1.27)
DEPRECATED RDW RBC AUTO: 45.8 FL (ref 37–54)
EOSINOPHIL # BLD AUTO: 0.14 10*3/MM3 (ref 0–0.4)
EOSINOPHIL NFR BLD AUTO: 1.5 % (ref 0.3–6.2)
ERYTHROCYTE [DISTWIDTH] IN BLOOD BY AUTOMATED COUNT: 14.6 % (ref 12.3–15.4)
GFR SERPL CREATININE-BSD FRML MDRD: 70 ML/MIN/1.73
GLOBULIN UR ELPH-MCNC: 2.6 GM/DL
GLUCOSE SERPL-MCNC: 87 MG/DL (ref 65–99)
HCT VFR BLD AUTO: 47.3 % (ref 37.5–51)
HGB BLD-MCNC: 15.2 G/DL (ref 13–17.7)
IMM GRANULOCYTES # BLD AUTO: 0.05 10*3/MM3 (ref 0–0.05)
IMM GRANULOCYTES NFR BLD AUTO: 0.5 % (ref 0–0.5)
LIPASE SERPL-CCNC: 40 U/L (ref 13–60)
LYMPHOCYTES # BLD AUTO: 2.18 10*3/MM3 (ref 0.7–3.1)
LYMPHOCYTES NFR BLD AUTO: 23.6 % (ref 19.6–45.3)
MCH RBC QN AUTO: 27.5 PG (ref 26.6–33)
MCHC RBC AUTO-ENTMCNC: 32.1 G/DL (ref 31.5–35.7)
MCV RBC AUTO: 85.7 FL (ref 79–97)
MONOCYTES # BLD AUTO: 1.23 10*3/MM3 (ref 0.1–0.9)
MONOCYTES NFR BLD AUTO: 13.3 % (ref 5–12)
NEUTROPHILS NFR BLD AUTO: 5.59 10*3/MM3 (ref 1.7–7)
NEUTROPHILS NFR BLD AUTO: 60.7 % (ref 42.7–76)
NRBC BLD AUTO-RTO: 0 /100 WBC (ref 0–0.2)
PLATELET # BLD AUTO: 223 10*3/MM3 (ref 140–450)
PMV BLD AUTO: 10.6 FL (ref 6–12)
POTASSIUM SERPL-SCNC: 3.9 MMOL/L (ref 3.5–5.2)
PROT SERPL-MCNC: 6.7 G/DL (ref 6–8.5)
RBC # BLD AUTO: 5.52 10*6/MM3 (ref 4.14–5.8)
SODIUM SERPL-SCNC: 137 MMOL/L (ref 136–145)
TROPONIN T SERPL-MCNC: <0.01 NG/ML (ref 0–0.03)
WBC # BLD AUTO: 9.23 10*3/MM3 (ref 3.4–10.8)

## 2021-01-26 PROCEDURE — 83690 ASSAY OF LIPASE: CPT | Performed by: EMERGENCY MEDICINE

## 2021-01-26 PROCEDURE — 80053 COMPREHEN METABOLIC PANEL: CPT | Performed by: EMERGENCY MEDICINE

## 2021-01-26 PROCEDURE — 96374 THER/PROPH/DIAG INJ IV PUSH: CPT

## 2021-01-26 PROCEDURE — 93005 ELECTROCARDIOGRAM TRACING: CPT

## 2021-01-26 PROCEDURE — 84484 ASSAY OF TROPONIN QUANT: CPT | Performed by: EMERGENCY MEDICINE

## 2021-01-26 PROCEDURE — 85025 COMPLETE CBC W/AUTO DIFF WBC: CPT | Performed by: EMERGENCY MEDICINE

## 2021-01-26 PROCEDURE — 99283 EMERGENCY DEPT VISIT LOW MDM: CPT

## 2021-01-26 PROCEDURE — 71046 X-RAY EXAM CHEST 2 VIEWS: CPT

## 2021-01-26 PROCEDURE — 25010000002 KETOROLAC TROMETHAMINE PER 15 MG: Performed by: EMERGENCY MEDICINE

## 2021-01-26 RX ORDER — KETOROLAC TROMETHAMINE 30 MG/ML
30 INJECTION, SOLUTION INTRAMUSCULAR; INTRAVENOUS ONCE
Status: COMPLETED | OUTPATIENT
Start: 2021-01-26 | End: 2021-01-26

## 2021-01-26 RX ADMIN — KETOROLAC TROMETHAMINE 30 MG: 30 INJECTION, SOLUTION INTRAMUSCULAR at 16:09

## 2021-02-01 ENCOUNTER — OFFICE VISIT (OUTPATIENT)
Dept: GASTROENTEROLOGY | Facility: CLINIC | Age: 46
End: 2021-02-01

## 2021-02-01 VITALS
TEMPERATURE: 97.8 F | BODY MASS INDEX: 38.36 KG/M2 | HEART RATE: 84 BPM | DIASTOLIC BLOOD PRESSURE: 70 MMHG | WEIGHT: 315 LBS | OXYGEN SATURATION: 97 % | SYSTOLIC BLOOD PRESSURE: 124 MMHG | HEIGHT: 76 IN

## 2021-02-01 DIAGNOSIS — D75.1 POLYCYTHEMIA: ICD-10-CM

## 2021-02-01 DIAGNOSIS — K75.81 NASH (NONALCOHOLIC STEATOHEPATITIS): Primary | ICD-10-CM

## 2021-02-01 DIAGNOSIS — E66.01 MORBID OBESITY (HCC): ICD-10-CM

## 2021-02-01 DIAGNOSIS — K21.9 GASTROESOPHAGEAL REFLUX DISEASE WITHOUT ESOPHAGITIS: ICD-10-CM

## 2021-02-01 PROCEDURE — 99214 OFFICE O/P EST MOD 30 MIN: CPT | Performed by: INTERNAL MEDICINE

## 2021-02-01 RX ORDER — PANTOPRAZOLE SODIUM 20 MG/1
20 TABLET, DELAYED RELEASE ORAL DAILY
Qty: 30 TABLET | Refills: 5 | Status: SHIPPED | OUTPATIENT
Start: 2021-02-01 | End: 2021-07-12

## 2021-02-01 NOTE — PROGRESS NOTES
Follow Up Note     Date: 2021   Patient Name: Raul Welch  MRN: [id number]  : 1975     Referring Physician: Domi Strickland APRN    Chief Complaint:    Chief Complaint   Patient presents with   • Follow-up   • Elevated Hepatic Enzymes       Interval History:   2021  Raul Welch is a 45 y.o. male who is here today for follow up for elevated LFTS.  He states that he lost about 35 pounds over 1 year now.   He denies any abdominal pain.  His diarrhea has improved now most passing solid stool, not use any loperamide.  His reflux symptoms also well controlled with the Protonix.  He did follow-up with the bariatrics however his insurance did not cover his bariatric surgery and could not proceed with that.  He is here to discuss his lab work done and further follow-up and management.    10/27/2020  Raul Welch is a 45 y.o. male who is here today to establish care with Gastroenterology for evaluation of elevated liver enzymes, fatty liver disease, splenomegaly.   Had recent lab work done and the ultrasound of the abdomen which revealed fatty liver disease and also splenomegaly and he is here to get further work-up on that.      He has been having issues with intermittent nausea vomiting and diarrhea since about 6 moths. He will have BM anywhere 3-4 times per day. Loose to soft. Also has associated Left sided abdominal pain.   This patient deny any hematochezia or melena.  Weight is stable. Pt has nausea vomiting intermittently but no or odynophagia or dysphagia. There is a history of acid reflux,m takes OTC antacids, not helping him much. There is no history of anemia. No prior history of EGD. Last colonoscopy was , polyp removed . No family history of colon cancer or any GI malignancy. No history of any abdominal surgery. Denies alcohol abuse or cigarette smoking.        Subjective      Past Medical History:   Past Medical History:   Diagnosis Date   • Arthritis    • Asthma      SPOUSE REPORTS ISSUES AS A CHILD   • Back pain    • Bell's palsy    • Blood in stool    • Depression    • Diabetes (CMS/HCC)    • Diarrhea     WITH ABDOMINAL CRAMPING   • Diverticulitis    • Elevated cholesterol    • GERD (gastroesophageal reflux disease)    • Headache    • High cholesterol    • History of being tatooed    • History of bronchitis    • History of fracture     HX OF FOOT (SPOUSE UNSURE LATERALITY)   • History of gout    • Hypertension    • Myocardial infarction (CMS/HCC) 1997   • Sleep apnea     USES BIPAP HS - TO BRING IN THE DOS   • Tattoos    • Wears glasses      Past Surgical History:   Past Surgical History:   Procedure Laterality Date   • APPENDECTOMY  1990   • CARDIAC CATHETERIZATION     • CARDIAC CATHETERIZATION N/A 9/30/2017    Procedure: Coronary angiography;  Surgeon: Zander Bobo MD;  Location: [facility] CATH INVASIVE LOCATION;  Service:    • COLONOSCOPY N/A 6/25/2018    Procedure: COLONOSCOPY with hot snare polypectomy,  cold snare polypectomy, cold biopsy polypectomies, and biopsies;  Surgeon: Sandro Potter MD;  Location: [facility] ENDOSCOPY;  Service: Gastroenterology   • KNEE SURGERY Left     removed infection/mass in knee   • OTHER SURGICAL HISTORY      SPOUSE REPORTS GROWTH REMOVED FROM THROAT WHEN PATIENT WAS A YOUNG CHILD       Family History:   Family History   Problem Relation Age of Onset   • Diabetes Mother    • Alcohol abuse Father    • Diabetes Father    • Heart disease Father    • Colon cancer Neg Hx    • Cirrhosis Neg Hx    • Liver cancer Neg Hx    • Liver disease Neg Hx        Social History:   Social History     Socioeconomic History   • Marital status:      Spouse name: Not on file   • Number of children: Not on file   • Years of education: Not on file   • Highest education level: Not on file   Tobacco Use   • Smoking status: Former Smoker     Packs/day: 0.25     Years: 0.50     Pack years: 0.12     Types: Cigarettes     Quit date: 1990     Years since  "quittin.1   • Smokeless tobacco: Never Used   • Tobacco comment: SPOUSE REPORTS PATIENT SMOKED VERY BRIEFLY WITH NO ADDICTIVE HABIT AS A TEENAGER. QUIT OVER 25 YEARS AGO.   Substance and Sexual Activity   • Alcohol use: Never     Frequency: Never   • Drug use: Never   • Sexual activity: Defer       Medications:     Current Outpatient Medications:   •  atorvastatin (Lipitor) 40 MG tablet, Take 1 tablet by mouth Daily., Disp: 30 tablet, Rfl: 6  •  B-D UF III MINI PEN NEEDLES 31G X 5 MM misc, USE ONCE DAILY WITH INSULIN INJECTIONS AS DIRECTED, Disp: , Rfl: 11  •  Blood Glucose Monitoring Suppl device, Use as directed to check blood sugar, Disp: 1 each, Rfl: 0  •  carvedilol (COREG) 25 MG tablet, Take 25 mg by mouth 2 (Two) Times a Day. Take with food, Disp: , Rfl:   •  cetirizine (ZyrTEC) 10 MG tablet, Take 12 mg by mouth Daily., Disp: , Rfl:   •  citalopram (CeleXA) 20 MG tablet, Take 20 mg by mouth Daily., Disp: , Rfl:   •  Ertugliflozin L-PyroglutamicAc (Steglatro) 15 MG tablet, Take 1 tablet by mouth Every Morning., Disp: 30 tablet, Rfl: 6  •  glucose blood test strip, Use as directed to check blood sugar, Disp: 100 each, Rfl: 11  •  glyburide (DIAbeta) 5 MG tablet, Take 2 tablets by mouth 2 (Two) Times a Day Before Meals., Disp: 120 tablet, Rfl: 6  •  hydrochlorothiazide (HYDRODIURIL) 25 MG tablet, Take 25 mg by mouth Daily., Disp: , Rfl:   •  Insulin Glargine (BASAGLAR KWIKPEN) 100 UNIT/ML injection pen, Inject 80 Units under the skin into the appropriate area as directed Every Night. Increase by 5 units every 3 days up to 80u daily, Disp: 8 pen, Rfl: 6  •  Insulin Pen Needle (PEN NEEDLES 1/2\") 29G X 12MM misc, Use as directed to inject insulin, Disp: 100 each, Rfl: 11  •  Insulin Pen Needle 32G X 6 MM misc, Use daily with insulin, Disp: 100 each, Rfl: 11  •  lisinopril (PRINIVIL,ZESTRIL) 40 MG tablet, Take 40 mg by mouth Daily., Disp: , Rfl:   •  loperamide (Imodium A-D) 2 MG tablet, Take 1 tablet by mouth " "3 (Three) Times a Day As Needed for Diarrhea., Disp: 20 tablet, Rfl: 0  •  metFORMIN (GLUCOPHAGE) 1000 MG tablet, Take 1 tablet by mouth 2 (Two) Times a Day With Meals., Disp: 60 tablet, Rfl: 6  •  modafinil (PROVIGIL) 100 MG tablet, TAKE 1 TABLET BY MOUTH ONE TIME A DAY , Disp: 30 tablet, Rfl: 0  •  nystatin-triamcinolone (MYCOLOG) 167387-9.1 UNIT/GM-% ointment, Apply to affected area 2 times daily, Disp: 30 g, Rfl: 1  •  Omega 3 1200 MG capsule, Take 1,000 mg by mouth 2 (Two) Times a Day., Disp: , Rfl:   •  omeprazole (priLOSEC) 40 MG capsule, TAKE 1 CAPSULE BY MOUTH EVERY DAY , Disp: 30 capsule, Rfl: 0  •  ondansetron ODT (ZOFRAN-ODT) 4 MG disintegrating tablet, Place 1 tablet on the tongue Every 8 (Eight) Hours As Needed for Nausea or Vomiting., Disp: 12 tablet, Rfl: 0  •  Semaglutide,0.25 or 0.5MG/DOS, (Ozempic, 0.25 or 0.5 MG/DOSE,) 2 MG/1.5ML solution pen-injector, inject 0.5mg weekly as directed, Disp: 1.5 mL, Rfl: 6  •  Vascepa 1 g capsule capsule, TAKE 2 CAPSULES BY MOUTH TWO TIMES A DAY WITH MEALS, Disp: 120 capsule, Rfl: 6    Allergies:   No Known Allergies    Review of Systems:   Review of Systems   Constitutional: Negative for appetite change, fatigue and unexpected weight loss.   HENT: Negative for trouble swallowing.    Gastrointestinal: Negative for abdominal distention, abdominal pain, anal bleeding, blood in stool, constipation, diarrhea, nausea, rectal pain, vomiting, GERD and indigestion.       The following portions of the patient's history were reviewed and updated as appropriate: allergies, current medications, past family history, past medical history, past social history, past surgical history and problem list.    Objective     Physical Exam:  Vital Signs:   Vitals:    02/01/21 1405   BP: 124/70   Pulse: 84   Temp: 97.8 °F (36.6 °C)   TempSrc: Temporal   SpO2: 97%   Weight: (!) 169 kg (372 lb)   Height: 193 cm (76\")       Physical Exam  Vitals signs and nursing note reviewed. "   Constitutional:       Appearance: He is well-developed. He is obese.   HENT:      Head: Normocephalic and atraumatic.      Right Ear: External ear normal.      Left Ear: External ear normal.   Eyes:      Conjunctiva/sclera: Conjunctivae normal.   Neck:      Musculoskeletal: Normal range of motion and neck supple.      Thyroid: No thyromegaly.      Trachea: No tracheal deviation.   Cardiovascular:      Rate and Rhythm: Normal rate and regular rhythm.      Heart sounds: No murmur.   Pulmonary:      Effort: Pulmonary effort is normal. No respiratory distress.      Breath sounds: Normal breath sounds.   Abdominal:      General: Bowel sounds are normal. There is no distension.      Palpations: Abdomen is soft. There is no mass.      Tenderness: There is no abdominal tenderness.      Hernia: No hernia is present.   Musculoskeletal: Normal range of motion.   Skin:     General: Skin is warm and dry.   Neurological:      Mental Status: He is alert and oriented to person, place, and time.      Cranial Nerves: No cranial nerve deficit.      Sensory: No sensory deficit.   Psychiatric:         Mood and Affect: Mood normal.         Behavior: Behavior normal.         Thought Content: Thought content normal.         Judgment: Judgment normal.         Results Review:   I reviewed the patient's new clinical results.    Admission on 01/26/2021, Discharged on 01/26/2021   Component Date Value Ref Range Status   • Glucose 01/26/2021 87  65 - 99 mg/dL Final   • BUN 01/26/2021 18  6 - 20 mg/dL Final   • Creatinine 01/26/2021 1.12  0.76 - 1.27 mg/dL Final   • Sodium 01/26/2021 137  136 - 145 mmol/L Final   • Potassium 01/26/2021 3.9  3.5 - 5.2 mmol/L Final   • Chloride 01/26/2021 102  98 - 107 mmol/L Final   • CO2 01/26/2021 23.7  22.0 - 29.0 mmol/L Final   • Calcium 01/26/2021 9.0  8.6 - 10.5 mg/dL Final   • Total Protein 01/26/2021 6.7  6.0 - 8.5 g/dL Final   • Albumin 01/26/2021 4.10  3.50 - 5.20 g/dL Final   • ALT (SGPT) 01/26/2021  23  1 - 41 U/L Final   • AST (SGOT) 01/26/2021 17  1 - 40 U/L Final   • Alkaline Phosphatase 01/26/2021 97  39 - 117 U/L Final   • Total Bilirubin 01/26/2021 0.4  0.0 - 1.2 mg/dL Final   • eGFR Non  Amer 01/26/2021 70  >60 mL/min/1.73 Final   • Globulin 01/26/2021 2.6  gm/dL Final   • A/G Ratio 01/26/2021 1.6  g/dL Final   • BUN/Creatinine Ratio 01/26/2021 16.1  7.0 - 25.0 Final   • Anion Gap 01/26/2021 11.3  5.0 - 15.0 mmol/L Final   • Lipase 01/26/2021 40  13 - 60 U/L Final   • Troponin T 01/26/2021 <0.010  0.000 - 0.030 ng/mL Final   • WBC 01/26/2021 9.23  3.40 - 10.80 10*3/mm3 Final   • RBC 01/26/2021 5.52  4.14 - 5.80 10*6/mm3 Final   • Hemoglobin 01/26/2021 15.2  13.0 - 17.7 g/dL Final   • Hematocrit 01/26/2021 47.3  37.5 - 51.0 % Final   • MCV 01/26/2021 85.7  79.0 - 97.0 fL Final   • MCH 01/26/2021 27.5  26.6 - 33.0 pg Final   • MCHC 01/26/2021 32.1  31.5 - 35.7 g/dL Final   • RDW 01/26/2021 14.6  12.3 - 15.4 % Final   • RDW-SD 01/26/2021 45.8  37.0 - 54.0 fl Final   • MPV 01/26/2021 10.6  6.0 - 12.0 fL Final   • Platelets 01/26/2021 223  140 - 450 10*3/mm3 Final   • Neutrophil % 01/26/2021 60.7  42.7 - 76.0 % Final   • Lymphocyte % 01/26/2021 23.6  19.6 - 45.3 % Final   • Monocyte % 01/26/2021 13.3* 5.0 - 12.0 % Final   • Eosinophil % 01/26/2021 1.5  0.3 - 6.2 % Final   • Basophil % 01/26/2021 0.4  0.0 - 1.5 % Final   • Immature Grans % 01/26/2021 0.5  0.0 - 0.5 % Final   • Neutrophils, Absolute 01/26/2021 5.59  1.70 - 7.00 10*3/mm3 Final   • Lymphocytes, Absolute 01/26/2021 2.18  0.70 - 3.10 10*3/mm3 Final   • Monocytes, Absolute 01/26/2021 1.23* 0.10 - 0.90 10*3/mm3 Final   • Eosinophils, Absolute 01/26/2021 0.14  0.00 - 0.40 10*3/mm3 Final   • Basophils, Absolute 01/26/2021 0.04  0.00 - 0.20 10*3/mm3 Final   • Immature Grans, Absolute 01/26/2021 0.05  0.00 - 0.05 10*3/mm3 Final   • nRBC 01/26/2021 0.0  0.0 - 0.2 /100 WBC Final   Office Visit on 01/22/2021   Component Date Value Ref Range Status    • Hemoglobin A1C 01/22/2021 6.3  % Final   • Glucose 01/22/2021 132* 70 - 130 mg/dL Final      Xr Chest 2 View    Result Date: 1/27/2021  Unremarkable chest exam.  This report was finalized on 1/27/2021 7:43 AM by Clarke Alanis MD.      Assessment / Plan        1.  Nonalcoholic steatohepatitis  Grey fibrosure; F0-F1 fibrosis, N1 steatohepatitis, S2 steatosis  2. Morbid obesity (CMS/HCC)  3. Splenomegaly/suspected mild portal hypertension versus relative enlargement with this morbid obesity  2/1/2021  He is not immune to hepatitis A and B.  Hep C antibody is negative.  ABEL anti-smooth muscle antibody, antimitochondrial antibody were negative.  Iron studies not consistent with hemochromatosis with a normal iron saturation.  Alpha-1 antitrypsin deficiency level and genotype were normal.  Ceruloplasmin levels normal.  His elevation of liver enzymes is secondary to nonalcoholic steatohepatitis   He lost about 35 pounds since 1 year.. His insurance did not cover for his bariatric surgery and he did not proceed with that.  His recent CMP revealed normal liver enzymes, expected result from his 35 pound weight loss.  We will repeat CMP in 6 months time  Advised to continue to lose weight at least 30 to 40 pounds in 4 months time  Prescription given for hepatitis A and hepatitis B combined vaccine with a booster    10/27/2020   patient has a borderline elevated liver enzymes with a normal total bilirubin normal alkaline phosphatase and normal platelets and albumin.  Recent ultrasound abdomen revealed fatty liver disease with splenomegaly suggesting development of portal hypertension.  He is morbidly obese metabolic syndrome  Some of his medication also elevated liver enzymes including the statins and Celexa.  He is nonalcoholic non-smoker.  This patient is extremely high risk for progression of the liver disease and cirrhosis.  He states that he Lost about 25 pouds last 6 months but he is nowhere near the target.  His  recent ultrasound abdomen CT scan abdomen pelvis and recent lab works reviewed.  We had a question on Mediterranean diet  He also need basic liver work-up to rule out other cause of elevated liver enzymes  He also has a significant issues with obstructive sleep apnea , diabetes mellitus , joint pains, he will likely need bariatric surgery and weight reduction.   We will repeat up to bariatrics for possible gastric sleeve       4. Polycythemia  2/1/2021  His recent hemoglobin proved and is 15 g/dL.  We will monitor for now  10/27/2020   his splenomegaly appears to be secondary to mild portal hypertension from his fatty liver disease possible Grey.  However he also has a polycythemia with a hemoglobin of 18.9 g/dL.  I am not entirely clear whether there is any other hematological process going on causing polycythemia splenomegaly.   We will repeat CBC today and if it is still high will refer him to allergy /oncology     5. Gastroesophageal reflux disease without esophagitis  6. Change in bowel habits  2/1/2021  He changed his diet significantly, then his bowel movements have improved now he is almost passing solid stools daily.  His reflux symptoms are under control with the Protonix 40 g p.o. daily.  No more nausea or vomiting.    We will reduce his Protonix dose to 20 mg p.o. daily for now, will consider discontinuing and make it as needed next visit if his symptoms completely under control.  Prescription given with the 5-month refill    10/27/2020  This is mostly associated with his diabetes mellitus.  He also has a history and clinical examination findings suggestive of irritable bowel syndrome with the diarrhea.   He always has left sided abdominal pain with the bloating.  This is associated with nausea and vomiting intermittently with the loose stools.   His Metformin and diabetes definitely contributing to his some of the symptoms.  He had a colonoscopy done in 2018 with random colon biopsies and terminal  biopsies were normal.  He is managing these with the Zofran and he thinks that he does not need any medication at this time  His not taking any prescription medication for his acid reflux, states that over-the-counter antacids is not helping.  We will start him on Prilosec 40 mils p.o. daily and discontinue after 8 weeks if his symptoms improve  He may need an EGD if his symptoms does not improve     Prior history  9. Personal history of colonic polyps  Colonoscopy done in 2018 revealed multiple colon polyps however only one of the polyp was tubular adenoma  He is due for surveillance colonoscopy in 2023          Follow Up:   No follow-ups on file.    Jhoana Snider MD  [facility]  2/1/2021  14:13 EST     Please note that portions of this note may have been completed with a voice recognition program.

## 2021-02-05 ENCOUNTER — LAB (OUTPATIENT)
Dept: LAB | Facility: HOSPITAL | Age: 46
End: 2021-02-05

## 2021-02-05 LAB
CHOLEST SERPL-MCNC: 88 MG/DL (ref 0–200)
HDLC SERPL-MCNC: 24 MG/DL (ref 40–60)
LDLC SERPL CALC-MCNC: 27 MG/DL (ref 0–100)
LDLC/HDLC SERPL: 0.69 {RATIO}
TRIGL SERPL-MCNC: 237 MG/DL (ref 0–150)
TSH SERPL DL<=0.05 MIU/L-ACNC: 0.86 UIU/ML (ref 0.27–4.2)
VLDLC SERPL-MCNC: 37 MG/DL (ref 5–40)

## 2021-02-05 PROCEDURE — 80061 LIPID PANEL: CPT | Performed by: PHYSICIAN ASSISTANT

## 2021-02-05 PROCEDURE — 84443 ASSAY THYROID STIM HORMONE: CPT | Performed by: PHYSICIAN ASSISTANT

## 2021-02-22 RX ORDER — OMEPRAZOLE 40 MG/1
CAPSULE, DELAYED RELEASE ORAL
Qty: 30 CAPSULE | Refills: 0 | OUTPATIENT
Start: 2021-02-22

## 2021-03-08 ENCOUNTER — TELEPHONE (OUTPATIENT)
Dept: ENDOCRINOLOGY | Facility: CLINIC | Age: 46
End: 2021-03-08

## 2021-03-08 NOTE — TELEPHONE ENCOUNTER
PATIENT STATES THAT HE RECEIVED A LETTER TODAY FROM HIS INSURANCE PROVIDER STATING THAT HIS BASAGLAR IS REUIRING A PRIOR AUTH.    CALL BACK [phone]

## 2021-03-09 NOTE — TELEPHONE ENCOUNTER
LM on VM asking him to have the pharmacy send me a denial. I do not have one nor has it been set up in [email or web address]

## 2021-04-21 DIAGNOSIS — E11.42 WELL CONTROLLED TYPE 2 DIABETES MELLITUS WITH PERIPHERAL NEUROPATHY (HCC): ICD-10-CM

## 2021-04-21 DIAGNOSIS — E78.2 MIXED HYPERLIPIDEMIA: ICD-10-CM

## 2021-04-21 RX ORDER — GLYBURIDE 5 MG/1
TABLET ORAL
Qty: 360 TABLET | Refills: 1 | Status: SHIPPED | OUTPATIENT
Start: 2021-04-21 | End: 2021-05-24

## 2021-04-21 RX ORDER — ATORVASTATIN CALCIUM 40 MG/1
TABLET, FILM COATED ORAL
Qty: 90 TABLET | Refills: 1 | Status: SHIPPED | OUTPATIENT
Start: 2021-04-21 | End: 2021-10-11

## 2021-04-23 ENCOUNTER — TELEPHONE (OUTPATIENT)
Dept: ENDOCRINOLOGY | Facility: CLINIC | Age: 46
End: 2021-04-23

## 2021-04-26 RX ORDER — INSULIN GLARGINE 100 [IU]/ML
80 INJECTION, SOLUTION SUBCUTANEOUS NIGHTLY
Qty: 24 ML | Refills: 6 | Status: SHIPPED | OUTPATIENT
Start: 2021-04-26 | End: 2021-05-24 | Stop reason: SDUPTHER

## 2021-05-10 RX ORDER — DULAGLUTIDE 0.75 MG/.5ML
0.75 INJECTION, SOLUTION SUBCUTANEOUS WEEKLY
Qty: 6 ML | Refills: 1 | Status: SHIPPED | OUTPATIENT
Start: 2021-05-10 | End: 2021-05-24 | Stop reason: DRUGHIGH

## 2021-05-11 ENCOUNTER — TELEPHONE (OUTPATIENT)
Dept: ENDOCRINOLOGY | Facility: CLINIC | Age: 46
End: 2021-05-11

## 2021-05-11 NOTE — TELEPHONE ENCOUNTER
Patient has been started on Trulicity from Ozempic due to his insurance and wants to know if he can finish his little bit of Ozempic left or if he needs to immediately start the Trulicity. Please advise.

## 2021-05-24 ENCOUNTER — LAB (OUTPATIENT)
Dept: LAB | Facility: HOSPITAL | Age: 46
End: 2021-05-24

## 2021-05-24 ENCOUNTER — OFFICE VISIT (OUTPATIENT)
Dept: ENDOCRINOLOGY | Facility: CLINIC | Age: 46
End: 2021-05-24

## 2021-05-24 VITALS
BODY MASS INDEX: 38.36 KG/M2 | WEIGHT: 315 LBS | SYSTOLIC BLOOD PRESSURE: 124 MMHG | DIASTOLIC BLOOD PRESSURE: 80 MMHG | HEIGHT: 76 IN | OXYGEN SATURATION: 96 % | HEART RATE: 80 BPM

## 2021-05-24 DIAGNOSIS — Z79.4 CONTROLLED TYPE 2 DIABETES MELLITUS WITH HYPOGLYCEMIA, WITH LONG-TERM CURRENT USE OF INSULIN (HCC): Primary | ICD-10-CM

## 2021-05-24 DIAGNOSIS — E78.2 MIXED HYPERLIPIDEMIA: Chronic | ICD-10-CM

## 2021-05-24 DIAGNOSIS — E11.649 CONTROLLED TYPE 2 DIABETES MELLITUS WITH HYPOGLYCEMIA, WITH LONG-TERM CURRENT USE OF INSULIN (HCC): Primary | ICD-10-CM

## 2021-05-24 LAB
ALBUMIN UR-MCNC: 1.4 MG/DL
CREAT UR-MCNC: 127.8 MG/DL
EXPIRATION DATE: NORMAL
EXPIRATION DATE: NORMAL
GLUCOSE BLDC GLUCOMTR-MCNC: 82 MG/DL (ref 70–130)
HBA1C MFR BLD: 6.4 %
Lab: NORMAL
Lab: NORMAL
MICROALBUMIN/CREAT UR: 11 MG/G

## 2021-05-24 PROCEDURE — 82570 ASSAY OF URINE CREATININE: CPT | Performed by: PHYSICIAN ASSISTANT

## 2021-05-24 PROCEDURE — 82947 ASSAY GLUCOSE BLOOD QUANT: CPT | Performed by: PHYSICIAN ASSISTANT

## 2021-05-24 PROCEDURE — 82043 UR ALBUMIN QUANTITATIVE: CPT | Performed by: PHYSICIAN ASSISTANT

## 2021-05-24 PROCEDURE — 99214 OFFICE O/P EST MOD 30 MIN: CPT | Performed by: PHYSICIAN ASSISTANT

## 2021-05-24 PROCEDURE — 83036 HEMOGLOBIN GLYCOSYLATED A1C: CPT | Performed by: PHYSICIAN ASSISTANT

## 2021-05-24 RX ORDER — INSULIN GLARGINE 100 [IU]/ML
70 INJECTION, SOLUTION SUBCUTANEOUS NIGHTLY
Qty: 72 ML | Refills: 1
Start: 2021-05-24 | End: 2021-12-09

## 2021-05-24 RX ORDER — DULAGLUTIDE 1.5 MG/.5ML
1.5 INJECTION, SOLUTION SUBCUTANEOUS WEEKLY
Qty: 6 ML | Refills: 1 | Status: SHIPPED | OUTPATIENT
Start: 2021-05-24 | End: 2021-09-10

## 2021-05-24 NOTE — PATIENT INSTRUCTIONS
Stop glyburide  Decrease lantus to 70 units  Increase trulicity to 1.5 mg weekly  Continue steglatro 15 mga daily, metformin 1000 mg 2x/day

## 2021-05-24 NOTE — PROGRESS NOTES
"Chief Complaint  F/u for Diabetes Mellitus.    HPI   Raul Welch is a 45 y.o. male who is here today for f/u of Diabetes Mellitus type 2. The initial diagnosis of diabetes was made in his 20s.     A1C- 6.4 (5/24/2021), 6.3 (1/22/2021), 6.1 (9/21/2020), 9 (6/10/2020), > 11 (10/30/19), 10.3 (9/5/19)    Diabetic complications: peripheral neuropathy  Eye exam current (within one year): due  Foot care and dental care: discussed    Current diabetic medications include:  Metformin 1000mg BID  Glyburide 5mg 2 tab BID AC  Steglatro 15mg daily  Trulicity 0.75 mg weekly  Lantus 80u qhs    Statin: lipitor 40, changed from zocor 20 6/2020, vascepa started 6/2020    Past medications: ozempic 0.5 mg (changed to trulicity due to insurance preference), basaglar     Diabetic Monitoring  -   No meter or log for review- he reports 2 episodes of hypoglycemia after work since last visit    Freestyle Jose was expensive.     Nutrition:     Current diet: in general, an \"unhealthy\" diet, on average, 2 meals per day, lunch and dinner. No sugared drinks.   Current exercise: none    The following portions of the patient's history were reviewed and updated by me as appropriate: allergies, current medications, past family history, past social history, past surgical history and problem list.      Past Medical History:   Diagnosis Date   • Arthritis    • Asthma     SPOUSE REPORTS ISSUES AS A CHILD   • Back pain    • Bell's palsy    • Blood in stool    • Depression    • Diabetes (CMS/[facility])    • Diarrhea     WITH ABDOMINAL CRAMPING   • Diverticulitis    • Elevated cholesterol    • GERD (gastroesophageal reflux disease)    • Headache    • High cholesterol    • History of being tatooed    • History of bronchitis    • History of fracture     HX OF FOOT (SPOUSE UNSURE LATERALITY)   • History of gout    • Hypertension    • Myocardial infarction (CMS/[facility]) 1997   • Sleep apnea     USES BIPAP HS - TO BRING IN THE DOS   • Tattoos    • Wears glasses  " "      Medications    Current Outpatient Medications:   •  atorvastatin (LIPITOR) 40 MG tablet, TAKE 1 TABLET BY MOUTH EVERY DAY , Disp: 90 tablet, Rfl: 1  •  B-D UF III MINI PEN NEEDLES 31G X 5 MM misc, USE ONCE DAILY WITH INSULIN INJECTIONS AS DIRECTED, Disp: , Rfl: 11  •  Blood Glucose Monitoring Suppl device, Use as directed to check blood sugar, Disp: 1 each, Rfl: 0  •  carvedilol (COREG) 25 MG tablet, Take 25 mg by mouth 2 (Two) Times a Day. Take with food, Disp: , Rfl:   •  cetirizine (ZyrTEC) 10 MG tablet, Take 12 mg by mouth Daily., Disp: , Rfl:   •  citalopram (CeleXA) 20 MG tablet, Take 20 mg by mouth Daily., Disp: , Rfl:   •  Ertugliflozin L-PyroglutamicAc (Steglatro) 15 MG tablet, Take 1 tablet by mouth Every Morning., Disp: 90 tablet, Rfl: 1  •  glucose blood test strip, Use as directed to check blood sugar, Disp: 100 each, Rfl: 11  •  hydrochlorothiazide (HYDRODIURIL) 25 MG tablet, Take 25 mg by mouth Daily., Disp: , Rfl:   •  Insulin Pen Needle (PEN NEEDLES 1/2\") 29G X 12MM misc, Use as directed to inject insulin, Disp: 100 each, Rfl: 11  •  Insulin Pen Needle 32G X 6 MM misc, Use daily with insulin, Disp: 100 each, Rfl: 11  •  Lantus SoloStar 100 UNIT/ML injection pen, Inject 70 Units under the skin into the appropriate area as directed Every Night., Disp: 72 mL, Rfl: 1  •  lisinopril (PRINIVIL,ZESTRIL) 40 MG tablet, Take 40 mg by mouth Daily., Disp: , Rfl:   •  loperamide (Imodium A-D) 2 MG tablet, Take 1 tablet by mouth 3 (Three) Times a Day As Needed for Diarrhea., Disp: 20 tablet, Rfl: 0  •  metFORMIN (GLUCOPHAGE) 1000 MG tablet, TAKE 1 TABLET BY MOUTH TWO TIMES A DAY WITH MEALS, Disp: 180 tablet, Rfl: 1  •  modafinil (PROVIGIL) 100 MG tablet, TAKE 1 TABLET BY MOUTH ONE TIME A DAY , Disp: 30 tablet, Rfl: 0  •  nystatin-triamcinolone (MYCOLOG) 622831-5.1 UNIT/GM-% ointment, Apply to affected area 2 times daily, Disp: 30 g, Rfl: 1  •  Omega 3 1200 MG capsule, Take 1,000 mg by mouth 2 (Two) Times a " "Day., Disp: , Rfl:   •  ondansetron ODT (ZOFRAN-ODT) 4 MG disintegrating tablet, Place 1 tablet on the tongue Every 8 (Eight) Hours As Needed for Nausea or Vomiting., Disp: 12 tablet, Rfl: 0  •  pantoprazole (Protonix) 20 MG EC tablet, Take 1 tablet by mouth Daily., Disp: 30 tablet, Rfl: 5  •  Vascepa 1 g capsule capsule, TAKE 2 CAPSULES BY MOUTH TWO TIMES A DAY WITH MEALS, Disp: 120 capsule, Rfl: 6  •  Dulaglutide (Trulicity) 1.5 MG/0.5ML solution pen-injector, Inject 1.5 mg under the skin into the appropriate area as directed 1 (One) Time Per Week., Disp: 6 mL, Rfl: 1    Review of Systems  Review of Systems   Constitutional: Negative for appetite change, chills, fatigue, fever and unexpected weight change.   HENT: Negative for ear discharge, ear pain, hearing loss, nosebleeds, rhinorrhea and sore throat.    Eyes: Negative for pain, redness and visual disturbance.   Respiratory: Negative for cough, shortness of breath and wheezing.    Cardiovascular: Negative for chest pain, palpitations and leg swelling.   Gastrointestinal: Negative for abdominal pain, blood in stool, constipation, diarrhea, nausea and vomiting.   Endocrine: Negative for cold intolerance, heat intolerance and polydipsia.   Genitourinary: Negative for difficulty urinating, discharge, dysuria, hematuria, penile pain, penile swelling, scrotal swelling, testicular pain and urgency.   Musculoskeletal: Positive for arthralgias and myalgias. Negative for gait problem and joint swelling.   Skin: Negative for rash.   Allergic/Immunologic: Negative.    Neurological: Negative for dizziness, syncope, weakness, numbness and headaches.   Hematological: Negative for adenopathy. Does not bruise/bleed easily.   Psychiatric/Behavioral: Negative for sleep disturbance and suicidal ideas. The patient is not nervous/anxious.         Physical Exam    /80 (BP Location: Left arm, Patient Position: Sitting, Cuff Size: Adult)   Pulse 80   Ht 193 cm (76\")   Wt (!) " 168 kg (370 lb)   SpO2 96%   BMI 45.04 kg/m² Body mass index is 45.04 kg/m².  Physical Exam   Constitutional: He is oriented to person, place, and time. He appears well-developed. No distress.   HENT:   Head: Normocephalic.   Right Ear: External ear normal.   Left Ear: External ear normal.   Nose: Nose normal.   Eyes: Conjunctivae are normal. Right eye exhibits no discharge. Left eye exhibits no discharge. No scleral icterus.   Neck: No JVD present. No tracheal deviation present. No thyromegaly present.   Cardiovascular: Normal rate, regular rhythm and normal heart sounds.   No murmur heard.  Pulmonary/Chest: Effort normal and breath sounds normal. No respiratory distress. He has no wheezes.   Abdominal: Soft. Bowel sounds are normal. There is no abdominal tenderness.   Musculoskeletal: No tenderness.    Raul had a diabetic foot exam performed today.   During the foot exam he had a monofilament test performed.    Neurological Sensory Findings - Altered hot/cold right ankle/foot discrimination and altered hot/cold left ankle/foot discrimination. Altered sharp/dull right ankle/foot discrimination and altered sharp/dull left ankle/foot discrimination. Right ankle/foot altered proprioception and left ankle/foot altered proprioception.  Vascular Status -  His right foot exhibits normal foot vasculature  and no edema. His left foot exhibits normal foot vasculature  and no edema.  Skin Integrity  -  His right foot skin is intact. He has right foot onychomycosis.  He has no right foot ulcer and non-callous right foot.His left foot skin is intact.He has left foot onychomycosis. He has no left foot ulcer and non-callous left foot..  Neurological: He is alert and oriented to person, place, and time.   Skin: Skin is warm and dry. No rash noted. He is not diaphoretic. No erythema.   Psychiatric: His behavior is normal. Judgment and thought content normal.       Labs and Imaging   Lab Results   Component Value Date    HGBA1C  6.4 05/24/2021    HGBA1C 6.3 01/22/2021    HGBA1C 6.1 09/21/2020     Office Visit on 05/24/2021   Component Date Value Ref Range Status   • Hemoglobin A1C 05/24/2021 6.4  % Final   • Lot Number 05/24/2021 [id number]   Final   • Expiration Date 05/24/2021 2-22-23   Final   • Glucose 05/24/2021 82  70 - 130 mg/dL Final   • Lot Number 05/24/2021 [id number]   Final   • Expiration Date 05/24/2021 4-29-21   Final     No images are attached to the encounter or orders placed in the encounter.  No Images in the past 120 days found..    Assessment / Plan   Diagnoses and all orders for this visit:    1. Controlled type 2 diabetes mellitus with hypoglycemia, with long-term current use of insulin (CMS/[facility]) (Primary)  -     POC Glycosylated Hemoglobin (Hb A1C)  -     POC Glucose, Blood  -     Lantus SoloStar 100 UNIT/ML injection pen; Inject 70 Units under the skin into the appropriate area as directed Every Night.  Dispense: 72 mL; Refill: 1  -     Dulaglutide (Trulicity) 1.5 MG/0.5ML solution pen-injector; Inject 1.5 mg under the skin into the appropriate area as directed 1 (One) Time Per Week.  Dispense: 6 mL; Refill: 1  -     Ertugliflozin L-PyroglutamicAc (Steglatro) 15 MG tablet; Take 1 tablet by mouth Every Morning.  Dispense: 90 tablet; Refill: 1  -     Microalbumin / Creatinine Urine Ratio - Urine, Clean Catch    2. Mixed hyperlipidemia        Diabetes Mellitus 2 is under good control  -A1c 6.4  -with 2 episodes of hypoglycemia during the day  -decrease lantus to 70 units daily  -increase trulicity to 1.5 mg weekly  -continue steglatro 15mg daily   -dc glyburide  -cont metformin 1000mg BID  -he was instructed to call if he continues to have readings <70 or >200 persistently  -eye exam due - encouraged to schedule      1.  Diet: 3-4 carb servings per meal for females, 4-5 carb servings per meal for males  Spread carb intake throughout the day  Increase lean protein and vegetable intake  Avoid sugary drinks and processed  carbs including crackers, cookies, cakes  2.  Exercise: Recommend at least 30 minutes of exercise daily, at least 5 days per week. Increase exercise gradually.   3.  Blood Glucose Goal: Blood glucose goal <150 fasting, <180 2 hr postprandial  4.  Microalbumin due 6/2021  5.  Education performed during this visit: long term diabetic complications discussed. , annual eye examinations at Ophthalmology discussed, dental hygiene discussed  and foot care reviewed., home glucose monitoring emphasized, all medications, side effects and compliance discussed carefully and Hypoglycemia management and prevention reviewed. Reviewed ‘ABCs’ of diabetes management (respective goals in parentheses):  A1C (<7), blood pressure (<130/80), and cholesterol (LDL <100, if CVD <70).      Mixed hyperlipidemia  -lipid panel utd 2/2021  -continue lipitor 40mg qhs, vacepa    Patient Instructions   Stop glyburide  Decrease lantus to 70 units  Increase trulicity to 1.5 mg weekly  Continue steglatro 15 mga daily, metformin 1000 mg 2x/day      Follow up: Return in about 3 months (around 8/24/2021).    Discussed the nature of the disease including, risks, complications, implications, management, safe and proper use of medications. Encouraged therapeutic lifestyle changes including low calorie diet with plenty of fruits and vegetables, daily exercise, medication compliance, and keeping scheduled follow up appointments with me and any other providers. Encouraged patient to have appointment for complete physical, fasting labs, appropriate screenings, and immunizations on an annual basis.      Jo-Ann Clark PA-C

## 2021-05-28 ENCOUNTER — PRIOR AUTHORIZATION (OUTPATIENT)
Dept: ENDOCRINOLOGY | Facility: CLINIC | Age: 46
End: 2021-05-28

## 2021-06-03 RX ORDER — CANAGLIFLOZIN 300 MG/1
300 TABLET, FILM COATED ORAL DAILY
Qty: 90 TABLET | Refills: 1 | Status: SHIPPED | OUTPATIENT
Start: 2021-06-03 | End: 2021-09-15 | Stop reason: SDUPTHER

## 2021-06-07 ENCOUNTER — OFFICE VISIT (OUTPATIENT)
Dept: NEUROLOGY | Facility: CLINIC | Age: 46
End: 2021-06-07

## 2021-06-07 VITALS
BODY MASS INDEX: 38.36 KG/M2 | HEIGHT: 76 IN | WEIGHT: 315 LBS | DIASTOLIC BLOOD PRESSURE: 80 MMHG | OXYGEN SATURATION: 95 % | HEART RATE: 86 BPM | SYSTOLIC BLOOD PRESSURE: 122 MMHG | TEMPERATURE: 96.8 F

## 2021-06-07 DIAGNOSIS — R40.4 TRANSIENT ALTERATION OF AWARENESS: ICD-10-CM

## 2021-06-07 DIAGNOSIS — R42 DIZZINESS: ICD-10-CM

## 2021-06-07 DIAGNOSIS — G47.33 OSA TREATED WITH BIPAP: ICD-10-CM

## 2021-06-07 DIAGNOSIS — R42 VERTIGO: Primary | ICD-10-CM

## 2021-06-07 PROCEDURE — 99214 OFFICE O/P EST MOD 30 MIN: CPT | Performed by: NURSE PRACTITIONER

## 2021-06-07 RX ORDER — MECLIZINE HYDROCHLORIDE 25 MG/1
25 TABLET ORAL 3 TIMES DAILY PRN
Qty: 20 TABLET | Refills: 2 | Status: SHIPPED | OUTPATIENT
Start: 2021-06-07

## 2021-06-07 NOTE — PROGRESS NOTES
"     Follow Up Office Visit      Patient Name: Raul Welch  : 1975   MRN: [id number]     Chief Complaint:    Chief Complaint   Patient presents with   • Follow-up     Patient c/o waking up and the room will feel like it's spinning. He also needs a different mask for his PAP therapy.        History of Present Illness: Raul Welch is a 45 y.o. male who is here today with complaints of feeling as if the room is spinning upon awakening.  He was last seen for his GONZÁLEZ on 12/15/2020.  He denies any recent hits to the head or MVA.  He says he feels as if he is going to fall over but he is laying down.  He says the symptom came on suddenly-he woke up in the middle of the night with that.  The episodes last for a few seconds.  He has also had dizziness upon standing \"too fast\".  He has not discussed these symptoms with his PCP or cardiologist (Dr. Flores).  He also says he has had episodes of loss of time that started recently.  He denies any history of seizure disorder.  His most recent HgbA1c was 6.3%.  He says he had an MRI head done last year due to headaches and says it was normal-those results have not been reviewed because he had it done outside of [facility].  He denies any chest pain or shortness of breath.  Additional risk factors- BMI 45, HTN, dyslipidemia, diabetes type 2, GERD.     Following taken from previous visit note:  Raul Welch is a 45 y.o. male who is here today to follow up for GONZÁLEZ.  He was seen in 2019 by Latonya YIN.  Currently on AutoBiPAP max IPAP 25cm and min EPAP 15cm, AHI 1.6/hour, compliance 96.7%.  He is tolerating his pressures well.  He says he is currently using a loaner machine because his machine stopped working.  He may need an order for a new machine but believes his old machine is only 3 years old.  He is using a nasal mask.  He uses [employer].  Additional risk factors- BMI 45, HTN, dyslipidemia, diabetes type 2, GERD. "      Subjective      Review of Systems:   Review of Systems   Constitutional: Negative for chills, fatigue and fever.   HENT: Negative for facial swelling, hearing loss, sore throat, tinnitus and trouble swallowing.    Eyes: Negative for blurred vision, double vision, photophobia and visual disturbance.   Respiratory: Negative for cough, chest tightness and shortness of breath.    Cardiovascular: Negative for chest pain, palpitations and leg swelling.   Gastrointestinal: Negative for abdominal pain, nausea and vomiting.   Endocrine: Negative for cold intolerance and heat intolerance.   Musculoskeletal: Negative for gait problem, neck pain and neck stiffness.   Skin: Negative for color change and rash.   Allergic/Immunologic: Negative for environmental allergies and food allergies.   Neurological: Positive for dizziness. Negative for syncope, speech difficulty, weakness, light-headedness, numbness, headache and memory problem.   Psychiatric/Behavioral: Negative for behavioral problems, sleep disturbance and depressed mood. The patient is not nervous/anxious.        I have reviewed and the following portions of the patient's history were updated as appropriate: past family history, past medical history, past social history, past surgical history and problem list.    Medications:     Current Outpatient Medications:   •  atorvastatin (LIPITOR) 40 MG tablet, TAKE 1 TABLET BY MOUTH EVERY DAY , Disp: 90 tablet, Rfl: 1  •  B-D UF III MINI PEN NEEDLES 31G X 5 MM misc, USE ONCE DAILY WITH INSULIN INJECTIONS AS DIRECTED, Disp: , Rfl: 11  •  Blood Glucose Monitoring Suppl device, Use as directed to check blood sugar, Disp: 1 each, Rfl: 0  •  Canagliflozin (Invokana) 300 MG tablet tablet, Take 300 mg by mouth Daily., Disp: 90 tablet, Rfl: 1  •  carvedilol (COREG) 25 MG tablet, Take 25 mg by mouth 2 (Two) Times a Day. Take with food, Disp: , Rfl:   •  cetirizine (ZyrTEC) 10 MG tablet, Take 12 mg by mouth Daily., Disp: , Rfl:   •   "citalopram (CeleXA) 20 MG tablet, Take 20 mg by mouth Daily., Disp: , Rfl:   •  Dulaglutide (Trulicity) 1.5 MG/0.5ML solution pen-injector, Inject 1.5 mg under the skin into the appropriate area as directed 1 (One) Time Per Week., Disp: 6 mL, Rfl: 1  •  glucose blood test strip, Use as directed to check blood sugar, Disp: 100 each, Rfl: 11  •  hydrochlorothiazide (HYDRODIURIL) 25 MG tablet, Take 25 mg by mouth Daily., Disp: , Rfl:   •  Insulin Pen Needle (PEN NEEDLES 1/2\") 29G X 12MM misc, Use as directed to inject insulin, Disp: 100 each, Rfl: 11  •  Insulin Pen Needle 32G X 6 MM misc, Use daily with insulin, Disp: 100 each, Rfl: 11  •  Lantus SoloStar 100 UNIT/ML injection pen, Inject 70 Units under the skin into the appropriate area as directed Every Night., Disp: 72 mL, Rfl: 1  •  lisinopril (PRINIVIL,ZESTRIL) 40 MG tablet, Take 40 mg by mouth Daily., Disp: , Rfl:   •  loperamide (Imodium A-D) 2 MG tablet, Take 1 tablet by mouth 3 (Three) Times a Day As Needed for Diarrhea., Disp: 20 tablet, Rfl: 0  •  meclizine (ANTIVERT) 25 MG tablet, Take 1 tablet by mouth 3 (Three) Times a Day As Needed for Dizziness., Disp: 20 tablet, Rfl: 2  •  metFORMIN (GLUCOPHAGE) 1000 MG tablet, TAKE 1 TABLET BY MOUTH TWO TIMES A DAY WITH MEALS, Disp: 180 tablet, Rfl: 1  •  modafinil (PROVIGIL) 100 MG tablet, TAKE 1 TABLET BY MOUTH ONE TIME A DAY , Disp: 30 tablet, Rfl: 0  •  nystatin-triamcinolone (MYCOLOG) 877013-9.1 UNIT/GM-% ointment, Apply to affected area 2 times daily, Disp: 30 g, Rfl: 1  •  Omega 3 1200 MG capsule, Take 1,000 mg by mouth 2 (Two) Times a Day., Disp: , Rfl:   •  ondansetron ODT (ZOFRAN-ODT) 4 MG disintegrating tablet, Place 1 tablet on the tongue Every 8 (Eight) Hours As Needed for Nausea or Vomiting., Disp: 12 tablet, Rfl: 0  •  pantoprazole (Protonix) 20 MG EC tablet, Take 1 tablet by mouth Daily., Disp: 30 tablet, Rfl: 5  •  Vascepa 1 g capsule capsule, TAKE 2 CAPSULES BY MOUTH TWO TIMES A DAY WITH MEALS, " "Disp: 120 capsule, Rfl: 6    Allergies:   No Known Allergies    Objective     Physical Exam:  Vital Signs:   Vitals:    06/07/21 0927   BP: 122/80   BP Location: Left arm   Patient Position: Sitting   Cuff Size: Adult   Pulse: 86   Temp: 96.8 °F (36 °C)   SpO2: 95%   Weight: (!) 170 kg (374 lb 6.4 oz)   Height: 193 cm (76\")   PainSc: 0-No pain     Body mass index is 45.57 kg/m².    Physical Exam  Vitals and nursing note reviewed.   Constitutional:       General: He is not in acute distress.     Appearance: He is well-developed. He is obese. He is not diaphoretic.   HENT:      Head: Normocephalic and atraumatic.   Eyes:      Conjunctiva/sclera: Conjunctivae normal.      Pupils: Pupils are equal, round, and reactive to light.   Cardiovascular:      Rate and Rhythm: Normal rate and regular rhythm.      Heart sounds: Normal heart sounds. No murmur heard.   No friction rub. No gallop.    Pulmonary:      Effort: Pulmonary effort is normal. No respiratory distress.      Breath sounds: Normal breath sounds. No wheezing or rales.   Musculoskeletal:         General: Normal range of motion.   Skin:     General: Skin is warm and dry.      Findings: No rash.   Neurological:      Mental Status: He is alert and oriented to person, place, and time.      Coordination: Finger-Nose-Finger Test and Romberg Test normal.      Gait: Gait is intact.   Psychiatric:         Mood and Affect: Mood normal.         Speech: Speech normal.         Behavior: Behavior normal.         Thought Content: Thought content normal.         Judgment: Judgment normal.         Neurologic Exam     Mental Status   Oriented to person, place, and time.   Attention: normal. Concentration: normal.   Speech: speech is normal   Level of consciousness: alert  Knowledge: good.     Cranial Nerves   Cranial nerves II through XII intact.     CN II   Visual fields full to confrontation.     CN III, IV, VI   Pupils are equal, round, and reactive to light.  Right pupil: Size: " 2 mm. Shape: regular. Reactivity: brisk.   Left pupil: Size: 2 mm. Shape: regular. Reactivity: brisk.   CN III: no CN III palsy  CN VI: no CN VI palsy  Nystagmus: none     CN V   Facial sensation intact.     CN VII   Facial expression full, symmetric.     CN VIII   CN VIII normal.     CN IX, X   CN IX normal.   CN X normal.     CN XI   CN XI normal.     CN XII   CN XII normal.   No facial droop.      Motor Exam   Muscle bulk: normal  Overall muscle tone: normal    Strength   Right biceps: 5/5  Left biceps: 5/5  Right triceps: 5/5  Left triceps: 5/5  Right quadriceps: 5/5  Left quadriceps: 5/5  Right hamstrin/5  Left hamstrin/5    Sensory Exam   Light touch normal.     Gait, Coordination, and Reflexes     Gait  Gait: normal    Coordination   Romberg: negative  Finger to nose coordination: normal    Tremor   Resting tremor: absent  Intention tremor: absent  Action tremor: absent       Assessment / Plan      Assessment/Plan:   Diagnoses and all orders for this visit:    1. Vertigo (Primary)  -     Ambulatory Referral to Physical Therapy Vestibular  -     meclizine (ANTIVERT) 25 MG tablet; Take 1 tablet by mouth 3 (Three) Times a Day As Needed for Dizziness.  Dispense: 20 tablet; Refill: 2. Indication and possible SEs discussed.   - Patient education on Vertigo and Dizziness provided today.   -     MRI Brain With & Without Contrast; Future    2. Dizziness  -     meclizine (ANTIVERT) 25 MG tablet; Take 1 tablet by mouth 3 (Three) Times a Day As Needed for Dizziness.  Dispense: 20 tablet; Refill: 2  -     MRI Brain With & Without Contrast; Future  - Advised patient to schedule a follow up with Dr. Flores and PCP as soon as possible to discuss symptoms.     3. GONZÁLEZ treated with BiPAP  - Order for new mask sent to patient's DME.     4. BMI 45.0-49.9, adult (CMS/HCC)    5. Transient alteration of awareness  -     EEG; Future       Follow Up:   Return in about 2 months (around 2021) for Recheck.    Macie YUAN  HUMPHREY Osorio, FNP-C  [facility]       Please note that portions of this note may have been completed with a voice recognition program. Efforts were made to edit the dictations, but occasionally words are mistranscribed.

## 2021-06-07 NOTE — PATIENT INSTRUCTIONS
Dizziness  Dizziness is a common problem. It makes you feel unsteady or light-headed. You may feel like you are about to pass out (faint). Dizziness can lead to getting hurt if you stumble or fall. Dizziness can be caused by many things, including:  · Medicines.  · Not having enough water in your body (dehydration).  · Illness.  Follow these instructions at home:  Eating and drinking    · Drink enough fluid to keep your pee (urine) clear or pale yellow. This helps to keep you from getting dehydrated. Try to drink more clear fluids, such as water.  · Do not drink alcohol.  · Limit how much caffeine you drink or eat, if your doctor tells you to do that.  · Limit how much salt (sodium) you drink or eat, if your doctor tells you to do that.  Activity    · Avoid making quick movements.  ? When you stand up from sitting in a chair, steady yourself until you feel okay.  ? In the morning, first sit up on the side of the bed. When you feel okay, stand slowly while you hold onto something. Do this until you know that your balance is fine.  · If you need to  one place for a long time, move your legs often. Tighten and relax the muscles in your legs while you are standing.  · Do not drive or use heavy machinery if you feel dizzy.  · Avoid bending down if you feel dizzy. Place items in your home so you can reach them easily without leaning over.  Lifestyle  · Do not use any products that contain nicotine or tobacco, such as cigarettes and e-cigarettes. If you need help quitting, ask your doctor.  · Try to lower your stress level. You can do this by using methods such as yoga or meditation. Talk with your doctor if you need help.  General instructions  · Watch your dizziness for any changes.  · Take over-the-counter and prescription medicines only as told by your doctor. Talk with your doctor if you think that you are dizzy because of a medicine that you are taking.  · Tell a friend or a family member that you are feeling  dizzy. If he or she notices any changes in your behavior, have this person call your doctor.  · Keep all follow-up visits as told by your doctor. This is important.  Contact a doctor if:  · Your dizziness does not go away.  · Your dizziness or light-headedness gets worse.  · You feel sick to your stomach (nauseous).  · You have trouble hearing.  · You have new symptoms.  · You are unsteady on your feet.  · You feel like the room is spinning.  Get help right away if:  · You throw up (vomit) or have watery poop (diarrhea), and you cannot eat or drink anything.  · You have trouble:  ? Talking.  ? Walking.  ? Swallowing.  ? Using your arms, hands, or legs.  · You feel generally weak.  · You are not thinking clearly, or you have trouble forming sentences. A friend or family member may notice this.  · You have:  ? Chest pain.  ? Pain in your belly (abdomen).  ? Shortness of breath.  ? Sweating.  · Your vision changes.  · You are bleeding.  · You have a very bad headache.  · You have neck pain or a stiff neck.  · You have a fever.  These symptoms may be an emergency. Do not wait to see if the symptoms will go away. Get medical help right away. Call your local emergency services (911 in the [place] Do not drive yourself to the hospital.  Summary  · Dizziness makes you feel unsteady or light-headed. You may feel like you are about to pass out (faint).  · Drink enough fluid to keep your pee (urine) clear or pale yellow. Do not drink alcohol.  · Avoid making quick movements if you feel dizzy.  · Watch your dizziness for any changes.  This information is not intended to replace advice given to you by your health care provider. Make sure you discuss any questions you have with your health care provider.  Document Revised: 12/21/2018 Document Reviewed: 01/04/2018  Elsevier Patient Education © 2021 Elsevier Inc.  Vertigo  Vertigo is the feeling that you or the things around you are moving when they are not. This feeling can come  and go at any time. Vertigo often goes away on its own. This condition can be dangerous if it happens when you are doing activities like driving or working with machines.  Your doctor will do tests to find the cause of your vertigo. These tests will also help your doctor decide on the best treatment for you.  Follow these instructions at home:  Eating and drinking         · Drink enough fluid to keep your pee (urine) pale yellow.  · Do not drink alcohol.  Activity  · Return to your normal activities as told by your doctor. Ask your doctor what activities are safe for you.  · In the morning, first sit up on the side of the bed. When you feel okay, stand slowly while you hold onto something until you know that your balance is fine.  · Move slowly. Avoid sudden body or head movements or certain positions, as told by your doctor.  · Use a cane if you have trouble standing or walking.  · Sit down right away if you feel dizzy.  · Avoid doing any tasks or activities that can cause danger to you or others if you get dizzy.  · Avoid bending down if you feel dizzy. Place items in your home so that they are easy for you to reach without leaning over.  · Do not drive or use heavy machinery if you feel dizzy.  General instructions  · Take over-the-counter and prescription medicines only as told by your doctor.  · Keep all follow-up visits as told by your doctor. This is important.  Contact a doctor if:  · Your medicine does not help your vertigo.  · You have a fever.  · Your problems get worse or you have new symptoms.  · Your family or friends see changes in your behavior.  · The feeling of being sick to your stomach gets worse.  · Your vomiting gets worse.  · You lose feeling (have numbness) in part of your body.  · You feel prickling and tingling in a part of your body.  Get help right away if:  · You have trouble moving or talking.  · You are always dizzy.  · You pass out (faint).  · You get very bad headaches.  · You feel  weak in your hands, arms, or legs.  · You have changes in your hearing.  · You have changes in how you see (vision).  · You get a stiff neck.  · Bright light starts to bother you.  Summary  · Vertigo is the feeling that you or the things around you are moving when they are not.  · Your doctor will do tests to find the cause of your vertigo.  · You may be told to avoid some tasks, positions, or movements.  · Contact a doctor if your medicine is not helping, or if you have a fever, new symptoms, or a change in behavior.  · Get help right away if you get very bad headaches, or if you have changes in how you speak, hear, or see.  This information is not intended to replace advice given to you by your health care provider. Make sure you discuss any questions you have with your health care provider.  Document Revised: 11/11/2019 Document Reviewed: 11/11/2019  Elsevier Patient Education © 2021 Elsevier Inc.

## 2021-06-16 ENCOUNTER — PRIOR AUTHORIZATION (OUTPATIENT)
Dept: ENDOCRINOLOGY | Facility: CLINIC | Age: 46
End: 2021-06-16

## 2021-06-16 ENCOUNTER — TELEPHONE (OUTPATIENT)
Dept: NEUROLOGY | Facility: CLINIC | Age: 46
End: 2021-06-16

## 2021-06-16 NOTE — TELEPHONE ENCOUNTER
Caller: Raul Welch Keenan    Relationship: Self    Best call back number: [phone]    What form or medical record are you requesting: COMPLIANCE FOR C-PAP    Who is requesting this form or medical record from you: MITESH     How would you like to receive the form or medical records (pick-up, mail, fax):   If fax, what is the fax number: [phone]    Timeframe paperwork needed: 06.18.20    Additional notes: GLADIS IS MAKING SURE PATIENT IS IN COMPLIANCE     1) NEED LAST OFFICE NOTE AND DATE OF APPT.      2) IN COMPLIANCE AND BENEFITING . AND USE MORE THAT 4 HOURS A NIGHT    NEED LETTER SIGNED AND PRINTED BY DOCTOR. AND DATED . OFFICE NAME AND PHONE NUMBER.      I ASKED WHY ELLY DIDN'T SEND A FORM , FAXED TO OFFICE , THEY TOLD PATIENT IT WAS HIS RESPONSIBILITY     PLEASE ADVISE

## 2021-06-16 NOTE — TELEPHONE ENCOUNTER
It looks like they are wanting a current compliance report. I do not see one in the patient's chart. Can we get that from them, I will sign it and then we can fax it back to them. Thanks.

## 2021-06-22 ENCOUNTER — HOSPITAL ENCOUNTER (OUTPATIENT)
Dept: SLEEP MEDICINE | Facility: HOSPITAL | Age: 46
Discharge: HOME | End: 2021-06-22
Admitting: NURSE PRACTITIONER

## 2021-06-22 DIAGNOSIS — R40.4 TRANSIENT ALTERATION OF AWARENESS: ICD-10-CM

## 2021-06-22 PROCEDURE — 95816 EEG AWAKE AND DROWSY: CPT

## 2021-06-23 NOTE — TELEPHONE ENCOUNTER
PT STATES THAT HE IS GETTING A CALL FROM [employer] HIS CPAP MACHINE ASKING FOR LETTER. PT WOULD LIKE TO KNOW IF THIS WAS COMPLETED. PLEASE ADVISE.     [phone]

## 2021-06-23 NOTE — TELEPHONE ENCOUNTER
THE PATIENT WAS SEEN ON 6/7/21. DO YOU THINK THAT NOTE WILL BE SUFFICIENT. MITESH ASKED FOR FOLLOW UP NOTES, SAYING HE IS BENEFITING FROM HIS MACHINE. FACE TO FACE,TELEPHONE.

## 2021-07-12 ENCOUNTER — TELEPHONE (OUTPATIENT)
Dept: PHYSICAL THERAPY | Facility: CLINIC | Age: 46
End: 2021-07-12

## 2021-07-12 RX ORDER — PANTOPRAZOLE SODIUM 20 MG/1
TABLET, DELAYED RELEASE ORAL
Qty: 30 TABLET | Refills: 0 | Status: SHIPPED | OUTPATIENT
Start: 2021-07-12 | End: 2021-08-11

## 2021-08-11 ENCOUNTER — OFFICE VISIT (OUTPATIENT)
Dept: NEUROLOGY | Facility: CLINIC | Age: 46
End: 2021-08-11

## 2021-08-11 VITALS
DIASTOLIC BLOOD PRESSURE: 84 MMHG | HEART RATE: 81 BPM | TEMPERATURE: 96.8 F | WEIGHT: 315 LBS | SYSTOLIC BLOOD PRESSURE: 132 MMHG | OXYGEN SATURATION: 99 % | BODY MASS INDEX: 38.36 KG/M2 | HEIGHT: 76 IN

## 2021-08-11 DIAGNOSIS — R42 VERTIGO: Primary | ICD-10-CM

## 2021-08-11 DIAGNOSIS — R42 DIZZINESS: ICD-10-CM

## 2021-08-11 DIAGNOSIS — G47.33 OSA TREATED WITH BIPAP: ICD-10-CM

## 2021-08-11 PROCEDURE — 99213 OFFICE O/P EST LOW 20 MIN: CPT | Performed by: NURSE PRACTITIONER

## 2021-08-11 RX ORDER — PANTOPRAZOLE SODIUM 20 MG/1
TABLET, DELAYED RELEASE ORAL
Qty: 30 TABLET | Refills: 0 | Status: SHIPPED | OUTPATIENT
Start: 2021-08-11 | End: 2021-09-08

## 2021-08-11 NOTE — PROGRESS NOTES
"     Follow Up Office Visit      Patient Name: Raul Welch  : 1975   MRN: [id number]     Chief Complaint:    Chief Complaint   Patient presents with   • Follow-up     pt in office to follow up on Vertigo and to discuss EEG test results.       History of Present Illness: Raul Welch is a 45 y.o. male who is here today to follow up with vertigo.  He was last seen on 2021.  He has not been to PT yet because his appointment had to be rescheduled.  He has PT scheduled for next Tuesday.  He has not had to take the Meclizine.  He has not had any spinning episodes happen since his last visit.  EEG on 2021 was normal.  He says he had his MRI brain done at an outside facility-those results are not available for review today.     He asks if anyone has sent over paperwork to his [employer] company (was [employer]).  He says he is getting multiple calls from them or someone claiming to be one of their representatives.      Following taken from previous visit note:    Raul Welch is a 45 y.o. male who is here today with complaints of feeling as if the room is spinning upon awakening.  He was last seen for his GONZÁLEZ on 12/15/2020.  He denies any recent hits to the head or MVA.  He says he feels as if he is going to fall over but he is laying down.  He says the symptom came on suddenly-he woke up in the middle of the night with that.  The episodes last for a few seconds.  He has also had dizziness upon standing \"too fast\".  He has not discussed these symptoms with his PCP or cardiologist (Dr. Flores).  He also says he has had episodes of loss of time that started recently.  He denies any history of seizure disorder.  His most recent HgbA1c was 6.3%.  He says he had an MRI head done last year due to headaches and says it was normal-those results have not been reviewed because he had it done outside of [facility].  He denies any chest pain or shortness of breath.  Additional risk factors: " BMI 45, HTN, dyslipidemia, diabetes type 2, GERD.      Review of Systems:   Review of Systems   Constitutional: Negative for chills, fatigue and fever.   HENT: Negative for facial swelling, hearing loss, sore throat, tinnitus and trouble swallowing.    Eyes: Negative for blurred vision, double vision, photophobia and visual disturbance.   Respiratory: Negative for cough, chest tightness and shortness of breath.    Cardiovascular: Negative for chest pain, palpitations and leg swelling.   Gastrointestinal: Negative for abdominal pain, nausea and vomiting.   Endocrine: Negative for cold intolerance and heat intolerance.   Musculoskeletal: Negative for gait problem, neck pain and neck stiffness.   Skin: Negative for color change and rash.   Allergic/Immunologic: Negative for environmental allergies and food allergies.   Neurological: Positive for dizziness. Negative for syncope, speech difficulty, weakness, light-headedness, numbness, headache and memory problem.   Psychiatric/Behavioral: Negative for behavioral problems, sleep disturbance and depressed mood. The patient is not nervous/anxious.        I have reviewed and the following portions of the patient's history were updated as appropriate: past family history, past medical history, past social history, past surgical history and problem list.    Medications:     Current Outpatient Medications:   •  atorvastatin (LIPITOR) 40 MG tablet, TAKE 1 TABLET BY MOUTH EVERY DAY , Disp: 90 tablet, Rfl: 1  •  Canagliflozin (Invokana) 300 MG tablet tablet, Take 300 mg by mouth Daily., Disp: 90 tablet, Rfl: 1  •  cetirizine (ZyrTEC) 10 MG tablet, Take 12 mg by mouth Daily., Disp: , Rfl:   •  citalopram (CeleXA) 20 MG tablet, Take 20 mg by mouth Daily., Disp: , Rfl:   •  Dulaglutide (Trulicity) 1.5 MG/0.5ML solution pen-injector, Inject 1.5 mg under the skin into the appropriate area as directed 1 (One) Time Per Week., Disp: 6 mL, Rfl: 1  •  hydrochlorothiazide (HYDRODIURIL) 25  "MG tablet, Take 25 mg by mouth Daily., Disp: , Rfl:   •  Lantus SoloStar 100 UNIT/ML injection pen, Inject 70 Units under the skin into the appropriate area as directed Every Night., Disp: 72 mL, Rfl: 1  •  lisinopril (PRINIVIL,ZESTRIL) 40 MG tablet, Take 40 mg by mouth Daily., Disp: , Rfl:   •  meclizine (ANTIVERT) 25 MG tablet, Take 1 tablet by mouth 3 (Three) Times a Day As Needed for Dizziness., Disp: 20 tablet, Rfl: 2  •  metFORMIN (GLUCOPHAGE) 1000 MG tablet, TAKE 1 TABLET BY MOUTH TWO TIMES A DAY WITH MEALS, Disp: 180 tablet, Rfl: 1  •  modafinil (PROVIGIL) 100 MG tablet, TAKE 1 TABLET BY MOUTH ONE TIME A DAY , Disp: 30 tablet, Rfl: 0  •  Omega 3 1200 MG capsule, Take 1,000 mg by mouth 2 (Two) Times a Day., Disp: , Rfl:   •  pantoprazole (PROTONIX) 20 MG EC tablet, TAKE 1 TABLET BY MOUTH EVERY DAY , Disp: 30 tablet, Rfl: 0  •  Vascepa 1 g capsule capsule, TAKE 2 CAPSULES BY MOUTH TWO TIMES A DAY WITH MEALS, Disp: 120 capsule, Rfl: 6  •  B-D UF III MINI PEN NEEDLES 31G X 5 MM misc, USE ONCE DAILY WITH INSULIN INJECTIONS AS DIRECTED, Disp: , Rfl: 11  •  Blood Glucose Monitoring Suppl device, Use as directed to check blood sugar, Disp: 1 each, Rfl: 0  •  carvedilol (COREG) 25 MG tablet, Take 25 mg by mouth 2 (Two) Times a Day. Take with food, Disp: , Rfl:   •  glucose blood test strip, Use as directed to check blood sugar, Disp: 100 each, Rfl: 11  •  Insulin Pen Needle (PEN NEEDLES 1/2\") 29G X 12MM misc, Use as directed to inject insulin, Disp: 100 each, Rfl: 11  •  Insulin Pen Needle 32G X 6 MM misc, Use daily with insulin, Disp: 100 each, Rfl: 11  •  loperamide (Imodium A-D) 2 MG tablet, Take 1 tablet by mouth 3 (Three) Times a Day As Needed for Diarrhea., Disp: 20 tablet, Rfl: 0  •  nystatin-triamcinolone (MYCOLOG) 546425-1.1 UNIT/GM-% ointment, Apply to affected area 2 times daily, Disp: 30 g, Rfl: 1  •  ondansetron ODT (ZOFRAN-ODT) 4 MG disintegrating tablet, Place 1 tablet on the tongue Every 8 (Eight) " "Hours As Needed for Nausea or Vomiting., Disp: 12 tablet, Rfl: 0    Allergies:   No Known Allergies    Objective     Physical Exam:  Vital Signs:   Vitals:    08/11/21 1413   BP: 132/84   Pulse: 81   Temp: 96.8 °F (36 °C)   SpO2: 99%   Weight: (!) 170 kg (374 lb 12.8 oz)   Height: 193 cm (76\")   PainSc: 0-No pain     Body mass index is 45.62 kg/m².    Physical Exam  Vitals and nursing note reviewed.   Constitutional:       General: He is not in acute distress.     Appearance: He is well-developed. He is obese. He is not diaphoretic.   HENT:      Head: Normocephalic and atraumatic.   Eyes:      Conjunctiva/sclera: Conjunctivae normal.      Pupils: Pupils are equal, round, and reactive to light.   Pulmonary:      Effort: Pulmonary effort is normal. No respiratory distress.   Skin:     General: Skin is dry.      Findings: No rash.   Neurological:      Mental Status: He is alert and oriented to person, place, and time.   Psychiatric:         Mood and Affect: Mood normal.         Behavior: Behavior normal.         Thought Content: Thought content normal.         Judgment: Judgment normal.         Neurologic Exam     Mental Status   Oriented to person, place, and time.     Cranial Nerves     CN III, IV, VI   Pupils are equal, round, and reactive to light.       Assessment / Plan      Assessment/Plan:   Diagnoses and all orders for this visit:    1. Vertigo (Primary)    2. Dizziness    3. GONZÁLEZ treated with BiPAP    4. BMI 45.0-49.9, adult (CMS/HCC)    * Advised patient to keep his appointment with PT as scheduled next week. Notify provider if symptoms return or worsen.   *Vertigo improved.   *Will contact DME and see what documents they are needing.    *Will obtain MRI brain results and review.     Follow Up:   Return in about 3 months (around 11/11/2021) for Recheck.    HUMPHREY Michele, FNP-C  [facility]       Please note that portions of this note may have been completed " with a voice recognition program. Efforts were made to edit the dictations, but occasionally words are mistranscribed.

## 2021-08-16 ENCOUNTER — TELEPHONE (OUTPATIENT)
Dept: NEUROLOGY | Facility: CLINIC | Age: 46
End: 2021-08-16

## 2021-08-16 DIAGNOSIS — R42 DIZZINESS: ICD-10-CM

## 2021-08-16 DIAGNOSIS — R42 VERTIGO: Primary | ICD-10-CM

## 2021-08-17 ENCOUNTER — TREATMENT (OUTPATIENT)
Dept: PHYSICAL THERAPY | Facility: CLINIC | Age: 46
End: 2021-08-17

## 2021-08-17 DIAGNOSIS — R51.9 INTRACTABLE HEADACHE, UNSPECIFIED CHRONICITY PATTERN, UNSPECIFIED HEADACHE TYPE: ICD-10-CM

## 2021-08-17 DIAGNOSIS — R26.89 LOSS OF BALANCE: ICD-10-CM

## 2021-08-17 DIAGNOSIS — R42 DIZZINESS: Primary | ICD-10-CM

## 2021-08-17 PROCEDURE — 97161 PT EVAL LOW COMPLEX 20 MIN: CPT | Performed by: PHYSICAL THERAPIST

## 2021-08-17 PROCEDURE — 97140 MANUAL THERAPY 1/> REGIONS: CPT | Performed by: PHYSICAL THERAPIST

## 2021-08-17 PROCEDURE — 97110 THERAPEUTIC EXERCISES: CPT | Performed by: PHYSICAL THERAPIST

## 2021-08-17 NOTE — PROGRESS NOTES
Physical Therapy Initial Evaluation and Plan of Care      Patient: Raul Welch   : 1975  Diagnosis/ICD-10 Code:  Dizziness [R42]  Referring practitioner: Macie Osorio*    Subjective Evaluation    History of Present Illness  Mechanism of injury: Loss of balance off and on for awhile.  He reports it may have started 1 year ago.  He has never fallen.  Pt reports he is experiencing this more with moving after being in one position for awhile.   He reports he feels like his leg are getting locked in and he can't react well.      Dizziness for about a month for 2 episodes when waking up.   Pt reports that this lasted a few minutes and was really bad.      Pt with history of back and knee PT.      DM with some peripherial neuropathy.     Pt reports he has HA for 2 years.      Pain  Current pain rating: 3 (Forward of head and neck pain.  )  At worst pain rating: 10 (HA )  Location: HA and neck.  occasional back pain.   Quality: sharp, tight, throbbing and pressure  Progression: worsening    Treatments  Previous treatment: physical therapy           Objective        Special Questions  Patient is experiencing dizziness.       Postural Observations  Seated posture: poor  Standing posture: poor  Correction of posture: makes symptoms better    Additional Postural Observation Details  Pt with moderate + forward head and protracted C/S.     Active Range of Motion   Cervical/Thoracic Spine   Cervical    Subcranial retraction: restricted   Left lateral flexion: 22 degrees   Right lateral flexion: 18 degrees   Left rotation: 56 degrees   Right rotation: 54 degrees     Additional Active Range of Motion Details  No dizziness with initial AROM activity.     Passive Range of Motion   Cervical/Thoracic Spine   Cervical   Subcranial retraction: restricted     Additional Passive Range of Motion Details  PROM is minimally restricted and does recreate dizziness especially in sitting.    Once relaxed and the MM not  guarded he was able to have improved PROM without dizziness and less HA noted.     Strength/Myotome Testing     Left Ankle/Foot   Dorsiflexion: 3+    Right Ankle/Foot   Dorsiflexion: 4    Functional Assessment     Comments  VOR: (+) head sxs elevated.  Smooth pursuits (-)  Head shake test (+) dizziness.   Nallely-halpike R (-)                     L (-)    Functional tests:  Standing EO feet together (slight sway)  Standing EC feet apart (moderate sway and dizziness).          General Comments     Ankle/Foot Comments   Toe walking Ekaterina x1 for balance.    Heel walking MinAx1 for balance.          Assessment & Plan     Assessment  Impairments: abnormal coordination, abnormal muscle firing, abnormal muscle tone, abnormal or restricted ROM, activity intolerance, impaired balance, impaired physical strength, lacks appropriate home exercise program and pain with function  Assessment details: Patient is a 45 year old male who comes to physical therapy with dizziness/imbalance, HA, and limited function. Signs and symptoms are consistent with cervicogenic dizziness with shortened upper cervical MM and tension.  He has functional deficits as well as some hyporesponsive vestibular patterns.      The patient currently has pain, decreased ROM, decreased strength, and inability to perform all essential functional activities. Pt will benefit from skilled PT services to address the above issues.     Prognosis: fair  Prognosis details:   SHORT TERM GOALS:     3 weeks  1. Pt independent with HEP  2. Pt to demonstrate cervical AROM 50-75% of expected norms to allow for improved ability to perform ADL's  3. Pt to report not having any headaches related to neck pain for the past 3 days    LONG TERM GOALS:   6 weeks  1. Pt to demonstrate cervical AROM % of expected norms to allow for improved safety when driving  2. Pt to demonstrate ability perform all transfers with no dizziness.   3. Pt to report being able to work full shift or work  in the home without increase in pain in the neck or head and with no dizziness.     Functional Limitations: carrying objects, lifting, sleeping, pulling, pushing, uncomfortable because of pain, reaching behind back, reaching overhead and unable to perform repetitive tasks  Plan  Therapy options: will be seen for skilled physical therapy services  Planned modality interventions: cryotherapy, thermotherapy (hydrocollator packs) and ultrasound  Planned therapy interventions: therapeutic activities, stretching, strengthening, soft tissue mobilization, postural training, motor coordination training, manual therapy, ADL retraining, body mechanics training, flexibility, functional ROM exercises, home exercise program, joint mobilization and IADL retraining  Duration in visits: 10  Treatment plan discussed with: patient  Plan details: Pt to be seen for PT for 1-2 x / week.         Manual Therapy:    11     mins  11619;  Therapeutic Exercise:    13     mins  72905;     Neuromuscular Pamela:        mins  03848;    Therapeutic Activity:          mins  42155;     Gait Training:           mins  57453;     Ultrasound:          mins  55017;    Electrical Stimulation:         mins  96587 ( );  Dry Needling          mins self-pay    Timed Treatment:   24   mins   Total Treatment:     50   mins    PT SIGNATURE: Jax Perkins, PT   DATE TREATMENT INITIATED: 8/17/2021    Initial Certification  Certification Period: 11/15/2021  I certify that the therapy services are furnished while this patient is under my care.  The services outlined above are required by this patient, and will be reviewed every 90 days.     PHYSICIAN: Macie Osorio, HUMPHREY      DATE:     Please sign and return via fax to  .. Thank you, [facility].

## 2021-08-25 ENCOUNTER — TREATMENT (OUTPATIENT)
Dept: PHYSICAL THERAPY | Facility: CLINIC | Age: 46
End: 2021-08-25

## 2021-08-25 DIAGNOSIS — R42 DIZZINESS: Primary | ICD-10-CM

## 2021-08-25 PROCEDURE — 97110 THERAPEUTIC EXERCISES: CPT | Performed by: PHYSICAL THERAPIST

## 2021-08-25 PROCEDURE — 97112 NEUROMUSCULAR REEDUCATION: CPT | Performed by: PHYSICAL THERAPIST

## 2021-08-25 NOTE — PROGRESS NOTES
Physical Therapy Daily Progress Note    TOTAL TIME: 40 MINUTES    Raul Welch reports: have been feeling bad this week, have not been able to do the exercises; feel tired and weak all the time; is having right sided pain - was told he has a fatty liver by gastroenterologist and feels that is what has been bothering him; headaches have not been too bad lately- also states that he has not been waking up with his neck as stiff as it used to be; denies dizziness, but 'a little off balance' ; no falls ; feels the unsteadiness is related more to the neuropathy; works for [employer], stock shelves at 4 locations      Objective   See Exercise, Manual, and Modality Logs for complete treatment.     THERAPEUTIC EXERCISES/ACTIVITIES ADDED TODAY: chin tucks, supine cervical rotation bilaterally, scapula retractions , standing static balance looking straight and with head turns side to side; standing calf raises  Performed HEP from last visit, VC's required for correct technique of exercises      Assessment/Plan  Limited endurance, requires frequent breaks, c/o increased pain in right side the last several days, limited exercise tolerance due to that pain, recommended patient call his PCP or gastroenterologist to discuss recent increase in pain; difficulty balancing most notably with head turns to the left- only able to turn head ~ 30 degrees to left and holding on heavily with both hands on table in order to maintain balance     Progress per Plan of Care and Progress strengthening /stabilization /functional activity           Manual Therapy:         mins  59014;  Therapeutic Exercise:    25     mins  76397;     Neuromuscular Pamela:    15    mins  32637;    Therapeutic Activity:          mins  49650;     Gait Training:           mins  01572;     Ultrasound:          mins  34651;    Electrical Stimulation:         mins  06609 ( );  Dry Needling          mins self-pay    Timed Treatment:   40   mins   Total Treatment:      40   mins    RENETTA Alford, PT  Physical Therapist

## 2021-09-02 ENCOUNTER — TREATMENT (OUTPATIENT)
Dept: PHYSICAL THERAPY | Facility: CLINIC | Age: 46
End: 2021-09-02

## 2021-09-02 DIAGNOSIS — R42 DIZZINESS: Primary | ICD-10-CM

## 2021-09-02 PROCEDURE — 97112 NEUROMUSCULAR REEDUCATION: CPT | Performed by: PHYSICAL THERAPIST

## 2021-09-02 PROCEDURE — 97110 THERAPEUTIC EXERCISES: CPT | Performed by: PHYSICAL THERAPIST

## 2021-09-05 NOTE — PROGRESS NOTES
Physical Therapy Daily Progress Note    TOTAL TIME: 35 MINUTES    Raul Welch reports: feels slightly improved ; continued pain in right abdomen - has not gone back to see gastroenterologist yet      Objective   See Exercise, Manual, and Modality Logs for complete treatment.     THERAPEUTIC EXERCISES/ACTIVITIES ADDED TODAY: seated smooth pursuit exercises  chin tucks, supine cervical rotation bilaterally, scapula retractions , standing static balance looking straight and with head turns side to side; standing calf raises  Performed HEP from last visit, VC's required for correct technique of exercises    Assessment/Plan  Patient has difficulty with standing balance, especially with head turns , patient feels he is making progress with cervical ROM    Progress per Plan of Care           Manual Therapy:         mins  05211;  Therapeutic Exercise:    20     mins  08882;     Neuromuscular Pamela:    15    mins  70078;    Therapeutic Activity:          mins  81614;     Gait Training:           mins  46427;     Ultrasound:          mins  39647;    Electrical Stimulation:         mins  82111 ( );  Dry Needling          mins self-pay    Timed Treatment:  35    mins   Total Treatment:     35   mins    RENETTA Alford, PT  Physical Therapist

## 2021-09-08 RX ORDER — PANTOPRAZOLE SODIUM 20 MG/1
TABLET, DELAYED RELEASE ORAL
Qty: 30 TABLET | Refills: 0 | Status: SHIPPED | OUTPATIENT
Start: 2021-09-08 | End: 2021-10-11

## 2021-09-09 ENCOUNTER — TREATMENT (OUTPATIENT)
Dept: PHYSICAL THERAPY | Facility: CLINIC | Age: 46
End: 2021-09-09

## 2021-09-09 DIAGNOSIS — R42 DIZZINESS: Primary | ICD-10-CM

## 2021-09-09 PROCEDURE — 97530 THERAPEUTIC ACTIVITIES: CPT | Performed by: PHYSICAL THERAPIST

## 2021-09-09 PROCEDURE — 97110 THERAPEUTIC EXERCISES: CPT | Performed by: PHYSICAL THERAPIST

## 2021-09-09 PROCEDURE — 97112 NEUROMUSCULAR REEDUCATION: CPT | Performed by: PHYSICAL THERAPIST

## 2021-09-09 NOTE — PROGRESS NOTES
Physical Therapy Daily Progress Note    TOTAL TIME: 55 MINUTES    Raul Welch reports: feeling better this week, compared to last week or two; not as dizzy       Objective   See Exercise, Manual, and Modality Logs for complete treatment.     THERAPEUTIC EXERCISES/ACTIVITIES ADDED TODAY: SLR, SL ABD, bridges, cervical flexor endurance with chin tuck, seated head turns, smooth pursuit, VOR habituation, seated c/s flexion stx and UT stx; standing balance, balance with head turns; SLS bilaterally    Assessment/Plan  Performed much better today with balance exercises; decreased c/o dizziness; continued weakness and requires frequent rest breaks    Progress per Plan of Care           Manual Therapy:         mins  29292;  Therapeutic Exercise:    15     mins  78872;     Neuromuscular Pamela:    15    mins  98549;    Therapeutic Activity:     15     mins  63888;     Gait Training:           mins  58772;     Ultrasound:          mins  99608;    Electrical Stimulation:         mins  24783 ( );  Dry Needling          mins self-pay    Timed Treatment:   45   mins   Total Treatment:     55   mins    RENETTA Alford, PT  Physical Therapist

## 2021-09-10 DIAGNOSIS — E11.649 CONTROLLED TYPE 2 DIABETES MELLITUS WITH HYPOGLYCEMIA, WITH LONG-TERM CURRENT USE OF INSULIN (HCC): ICD-10-CM

## 2021-09-10 DIAGNOSIS — Z79.4 CONTROLLED TYPE 2 DIABETES MELLITUS WITH HYPOGLYCEMIA, WITH LONG-TERM CURRENT USE OF INSULIN (HCC): ICD-10-CM

## 2021-09-10 RX ORDER — DULAGLUTIDE 1.5 MG/.5ML
INJECTION, SOLUTION SUBCUTANEOUS
Qty: 6 ML | Refills: 1 | Status: SHIPPED | OUTPATIENT
Start: 2021-09-10 | End: 2022-02-07

## 2021-09-15 ENCOUNTER — OFFICE VISIT (OUTPATIENT)
Dept: ENDOCRINOLOGY | Facility: CLINIC | Age: 46
End: 2021-09-15

## 2021-09-15 VITALS
OXYGEN SATURATION: 95 % | HEIGHT: 76 IN | SYSTOLIC BLOOD PRESSURE: 132 MMHG | DIASTOLIC BLOOD PRESSURE: 80 MMHG | WEIGHT: 315 LBS | BODY MASS INDEX: 38.36 KG/M2 | HEART RATE: 65 BPM

## 2021-09-15 DIAGNOSIS — E11.65 UNCONTROLLED TYPE 2 DIABETES MELLITUS WITH HYPERGLYCEMIA (HCC): Primary | Chronic | ICD-10-CM

## 2021-09-15 DIAGNOSIS — E78.2 MIXED HYPERLIPIDEMIA: Chronic | ICD-10-CM

## 2021-09-15 LAB
GLUCOSE BLDC GLUCOMTR-MCNC: 272 MG/DL (ref 70–130)
HBA1C MFR BLD: 10.1 %

## 2021-09-15 PROCEDURE — 99214 OFFICE O/P EST MOD 30 MIN: CPT | Performed by: PHYSICIAN ASSISTANT

## 2021-09-15 PROCEDURE — 82947 ASSAY GLUCOSE BLOOD QUANT: CPT | Performed by: PHYSICIAN ASSISTANT

## 2021-09-15 RX ORDER — GLYBURIDE 5 MG/1
10 TABLET ORAL
Qty: 180 TABLET | Refills: 1 | Status: SHIPPED | OUTPATIENT
Start: 2021-09-15 | End: 2022-02-07

## 2021-09-15 RX ORDER — ICOSAPENT ETHYL 1000 MG/1
2 CAPSULE ORAL 2 TIMES DAILY WITH MEALS
Qty: 360 CAPSULE | Refills: 1 | Status: SHIPPED | OUTPATIENT
Start: 2021-09-15 | End: 2022-05-03

## 2021-09-15 RX ORDER — CANAGLIFLOZIN 300 MG/1
300 TABLET, FILM COATED ORAL DAILY
Qty: 90 TABLET | Refills: 1 | Status: SHIPPED | OUTPATIENT
Start: 2021-09-15 | End: 2022-01-10

## 2021-09-15 NOTE — PROGRESS NOTES
Chief Complaint  F/u for Diabetes Mellitus.    HPI   Raul Welch is a 46 y.o. male who is here today for f/u of Diabetes Mellitus type 2. The initial diagnosis of diabetes was made in his 20s.     Insurance stopped covering Steglatro. We changed to Invokana, but he has not picked it up at the pharmacy - unclear if covered or not.    Off Vascepa - insurance was not covered but PA has been approved now.  We stopped Glyburide and decreased Lantus to 70 units last visit. He is eating less than before. A1C up to 10.1 today.      A1C- 10.1 (9/15/2021), 6.4 (5/24/2021), 6.3 (1/22/2021), 6.1 (9/21/2020), 9 (6/10/2020), > 11 (10/30/19), 10.3 (9/5/19)    Diabetic complications: peripheral neuropathy  Eye exam current (within one year): due  Foot care and dental care: discussed    Current diabetic medications include:  Metformin 1000mg BID  Trulicity 1.5 mg weekly  Lantus 70u qhs    Statin: lipitor 40, changed from zocor 20 6/2020, vascepa started 6/2020    Past medications: ozempic 0.5 mg (changed to trulicity due to insurance preference), basaglar, Steglatro, glyburide    Diabetic Monitoring  -   Meter readings reviewed, he is testing 1-2 times per day, 200-300s, no hypoglycemia    Freestyle Jose was expensive.     The following portions of the patient's history were reviewed and updated by me as appropriate: allergies, current medications, past family history, past social history, past surgical history and problem list.        Past Medical History:   Diagnosis Date   • Arthritis    • Asthma     SPOUSE REPORTS ISSUES AS A CHILD   • Back pain    • Bell's palsy    • Blood in stool    • Depression    • Diabetes (CMS/[facility])    • Diarrhea     WITH ABDOMINAL CRAMPING   • Diverticulitis    • Elevated cholesterol    • GERD (gastroesophageal reflux disease)    • Headache    • High cholesterol    • History of being tatooed    • History of bronchitis    • History of fracture     HX OF FOOT (SPOUSE UNSURE LATERALITY)   • History  "of gout    • Hypertension    • Myocardial infarction (CMS/[facility]) 1997   • Sleep apnea     USES BIPAP HS - TO BRING IN THE DOS   • Tattoos    • Wears glasses        Medications    Current Outpatient Medications:   •  atorvastatin (LIPITOR) 40 MG tablet, TAKE 1 TABLET BY MOUTH EVERY DAY , Disp: 90 tablet, Rfl: 1  •  B-D UF III MINI PEN NEEDLES 31G X 5 MM misc, USE ONCE DAILY WITH INSULIN INJECTIONS AS DIRECTED, Disp: , Rfl: 11  •  Blood Glucose Monitoring Suppl device, Use as directed to check blood sugar, Disp: 1 each, Rfl: 0  •  carvedilol (COREG) 25 MG tablet, Take 25 mg by mouth 2 (Two) Times a Day. Take with food, Disp: , Rfl:   •  cetirizine (ZyrTEC) 10 MG tablet, Take 12 mg by mouth Daily., Disp: , Rfl:   •  citalopram (CeleXA) 20 MG tablet, Take 20 mg by mouth Daily., Disp: , Rfl:   •  glucose blood test strip, Use as directed to check blood sugar, Disp: 100 each, Rfl: 11  •  hydrochlorothiazide (HYDRODIURIL) 25 MG tablet, Take 25 mg by mouth Daily., Disp: , Rfl:   •  Insulin Pen Needle (PEN NEEDLES 1/2\") 29G X 12MM misc, Use as directed to inject insulin, Disp: 100 each, Rfl: 11  •  Insulin Pen Needle 32G X 6 MM misc, Use daily with insulin, Disp: 100 each, Rfl: 11  •  Lantus SoloStar 100 UNIT/ML injection pen, Inject 70 Units under the skin into the appropriate area as directed Every Night., Disp: 72 mL, Rfl: 1  •  lisinopril (PRINIVIL,ZESTRIL) 40 MG tablet, Take 40 mg by mouth Daily., Disp: , Rfl:   •  loperamide (Imodium A-D) 2 MG tablet, Take 1 tablet by mouth 3 (Three) Times a Day As Needed for Diarrhea., Disp: 20 tablet, Rfl: 0  •  meclizine (ANTIVERT) 25 MG tablet, Take 1 tablet by mouth 3 (Three) Times a Day As Needed for Dizziness., Disp: 20 tablet, Rfl: 2  •  metFORMIN (GLUCOPHAGE) 1000 MG tablet, TAKE 1 TABLET BY MOUTH TWO TIMES A DAY WITH MEALS, Disp: 180 tablet, Rfl: 1  •  nystatin-triamcinolone (MYCOLOG) 621985-1.1 UNIT/GM-% ointment, Apply to affected area 2 times daily, Disp: 30 g, Rfl: 1  •  " Omega 3 1200 MG capsule, Take 1,000 mg by mouth 2 (Two) Times a Day., Disp: , Rfl:   •  ondansetron ODT (ZOFRAN-ODT) 4 MG disintegrating tablet, Place 1 tablet on the tongue Every 8 (Eight) Hours As Needed for Nausea or Vomiting., Disp: 12 tablet, Rfl: 0  •  pantoprazole (PROTONIX) 20 MG EC tablet, TAKE 1 TABLET BY MOUTH EVERY DAY , Disp: 30 tablet, Rfl: 0  •  Trulicity 1.5 MG/0.5ML solution pen-injector, INJECT 1.5MG UNDER THE SKIN INTO THE APPROPRIATE AREA AS DIRECTED ONE TIME PER WEEK , Disp: 6 mL, Rfl: 1  •  Canagliflozin (Invokana) 300 MG tablet tablet, Take 1 tablet by mouth Daily., Disp: 90 tablet, Rfl: 1  •  glyburide (DIAbeta) 5 MG tablet, Take 2 tablets by mouth Daily With Breakfast., Disp: 180 tablet, Rfl: 1  •  icosapent ethyl (Vascepa) 1 g capsule capsule, Take 2 g by mouth 2 (Two) Times a Day With Meals., Disp: 360 capsule, Rfl: 1  •  modafinil (PROVIGIL) 100 MG tablet, TAKE 1 TABLET BY MOUTH ONE TIME A DAY , Disp: 30 tablet, Rfl: 0    Review of Systems  Review of Systems   Constitutional: Negative for appetite change, chills, fatigue, fever and unexpected weight change.   HENT: Negative for ear discharge, ear pain, hearing loss, nosebleeds, rhinorrhea and sore throat.    Eyes: Negative for pain, redness and visual disturbance.   Respiratory: Negative for cough, shortness of breath and wheezing.    Cardiovascular: Negative for chest pain, palpitations and leg swelling.   Gastrointestinal: Negative for abdominal pain, blood in stool, constipation, diarrhea, nausea and vomiting.   Endocrine: Negative for cold intolerance, heat intolerance and polydipsia.   Genitourinary: Negative for difficulty urinating, discharge, dysuria, hematuria, penile pain, penile swelling, scrotal swelling, testicular pain and urgency.   Musculoskeletal: Positive for arthralgias and myalgias. Negative for gait problem and joint swelling.   Skin: Negative for rash.   Allergic/Immunologic: Negative.    Neurological: Negative for  "dizziness, syncope, weakness, numbness and headaches.   Hematological: Negative for adenopathy. Does not bruise/bleed easily.   Psychiatric/Behavioral: Negative for sleep disturbance and suicidal ideas. The patient is not nervous/anxious.         Physical Exam    /80   Pulse 65   Ht 193 cm (76\")   Wt (!) 167 kg (369 lb)   SpO2 95%   BMI 44.92 kg/m² Body mass index is 44.92 kg/m².  Physical Exam   Constitutional: He is oriented to person, place, and time. He appears well-developed. No distress.   HENT:   Head: Normocephalic.   Right Ear: External ear normal.   Left Ear: External ear normal.   Nose: Nose normal.   Eyes: Conjunctivae are normal. Right eye exhibits no discharge. Left eye exhibits no discharge. No scleral icterus.   Neck: No JVD present. No tracheal deviation present. No thyromegaly present.   Cardiovascular: Normal rate, regular rhythm and normal heart sounds.   No murmur heard.  Pulmonary/Chest: Effort normal and breath sounds normal. No respiratory distress. He has no wheezes.   Abdominal: Soft. Bowel sounds are normal. There is no abdominal tenderness.   Musculoskeletal: No tenderness.   Neurological: He is alert and oriented to person, place, and time.   Skin: Skin is warm and dry. No rash noted. He is not diaphoretic. No erythema.   Psychiatric: His behavior is normal. Judgment and thought content normal.       Labs and Imaging   Lab Results   Component Value Date    HGBA1C 10.1 09/15/2021    HGBA1C 6.4 05/24/2021    HGBA1C 6.3 01/22/2021     Office Visit on 09/15/2021   Component Date Value Ref Range Status   • Glucose 09/15/2021 272* 70 - 130 mg/dL Final   • Hemoglobin A1C 09/15/2021 10.1  % Final     Assessment / Plan   Diagnoses and all orders for this visit:    1. Uncontrolled type 2 diabetes mellitus with hyperglycemia (CMS/[facility]) (Primary)  -     POC Glucose, Blood  -     POC Glycosylated Hemoglobin (Hb A1C)  -     Canagliflozin (Invokana) 300 MG tablet tablet; Take 1 tablet by " mouth Daily.  Dispense: 90 tablet; Refill: 1  -     glyburide (DIAbeta) 5 MG tablet; Take 2 tablets by mouth Daily With Breakfast.  Dispense: 180 tablet; Refill: 1    2. Mixed hyperlipidemia  -     icosapent ethyl (Vascepa) 1 g capsule capsule; Take 2 g by mouth 2 (Two) Times a Day With Meals.  Dispense: 360 capsule; Refill: 1        Diabetes Mellitus 2 is under poor control  -A1c 10.1, up from 6.4 last visit  -continue trulicity 1.5 mg weekly  -Start Invokana 300 mg daily, Rx resent with request for PA  -resume glyburide at 5 mg 2 pills daily AC  -cont metformin 1000mg BID  -After 2 weeks if BG consistently above 200 he will increase Lantus back to 80 units daily  -Patient to call if he develops persistent hypoglycemia or hyperglycemia greater than 200  -eye exam due - encouraged to schedule  -will obtain recent labs from pcp  -Foot exam updated 5/2021      1.  Diet: 3-4 carb servings per meal for females, 4-5 carb servings per meal for males  Spread carb intake throughout the day  Increase lean protein and vegetable intake  Avoid sugary drinks and processed carbs including crackers, cookies, cakes  2.  Exercise: Recommend at least 30 minutes of exercise daily, at least 5 days per week. Increase exercise gradually.   3.  Blood Glucose Goal: Blood glucose goal <150 fasting, <180 2 hr postprandial  4.  Microalbumin due 5/2022  5.  Education performed during this visit: long term diabetic complications discussed. , annual eye examinations at Ophthalmology discussed, dental hygiene discussed  and foot care reviewed., home glucose monitoring emphasized, all medications, side effects and compliance discussed carefully and Hypoglycemia management and prevention reviewed. Reviewed ‘ABCs’ of diabetes management (respective goals in parentheses):  A1C (<7), blood pressure (<130/80), and cholesterol (LDL <100, if CVD <70).    Mixed hyperlipidemia  -recent lipid panel with pcp  -continue lipitor 40mg qhs, resume  vascepa    There are no Patient Instructions on file for this visit.    Follow up: Return in about 3 months (around 12/15/2021).    Discussed the nature of the disease including, risks, complications, implications, management, safe and proper use of medications. Encouraged therapeutic lifestyle changes including low calorie diet with plenty of fruits and vegetables, daily exercise, medication compliance, and keeping scheduled follow up appointments with me and any other providers. Encouraged patient to have appointment for complete physical, fasting labs, appropriate screenings, and immunizations on an annual basis.      Jo-Ann Clark PA-C

## 2021-09-17 ENCOUNTER — TREATMENT (OUTPATIENT)
Dept: PHYSICAL THERAPY | Facility: CLINIC | Age: 46
End: 2021-09-17

## 2021-09-17 DIAGNOSIS — R42 DIZZINESS: Primary | ICD-10-CM

## 2021-09-17 PROCEDURE — 97112 NEUROMUSCULAR REEDUCATION: CPT | Performed by: PHYSICAL THERAPIST

## 2021-09-17 PROCEDURE — 97110 THERAPEUTIC EXERCISES: CPT | Performed by: PHYSICAL THERAPIST

## 2021-09-17 NOTE — PROGRESS NOTES
Physical Therapy Daily Progress Note    TOTAL TIME: 30 MINUTES    Raul Welch reports: patient states he is not feeling well this week; had a few episodes of dizziness while lying down in bed, woke up with vertigo; had a blood sugar spike on Tuesday, still elevated but going down; 'MD has changed my medicines around lately'       Objective   See Exercise, Manual, and Modality Logs for complete treatment.     THERAPEUTIC EXERCISES/ACTIVITIES ADDED TODAY: tandem stance, tandem stance with head turns, SLS, MIP with slow / controlled movement; walking with tandem stance, walking in 'deep snow' with slow / controlled gait; see previous hep and flow sheets  Unable to do supine exc today- became dizzy upon lying down- felt good with upright    Assessment/Plan  Patient making some mild progress with dynamic and static balance; improved smooth pursuit ability ; dizziness does not seem to correlate with BPPV at this time    Progress per Plan of Care           Manual Therapy:         mins  10471;  Therapeutic Exercise:    15     mins  67849;     Neuromuscular Pamela:    15    mins  69974;    Therapeutic Activity:          mins  46190;     Gait Training:           mins  13050;     Ultrasound:          mins  13527;    Electrical Stimulation:         mins  38085 ( );  Dry Needling          mins self-pay    Timed Treatment:   30   mins   Total Treatment:     30   mins    RENETTA Alford PT  Physical Therapist

## 2021-09-27 ENCOUNTER — TELEPHONE (OUTPATIENT)
Dept: PHYSICAL THERAPY | Facility: CLINIC | Age: 46
End: 2021-09-27

## 2021-09-29 ENCOUNTER — OFFICE VISIT (OUTPATIENT)
Dept: GASTROENTEROLOGY | Facility: CLINIC | Age: 46
End: 2021-09-29

## 2021-09-29 VITALS
SYSTOLIC BLOOD PRESSURE: 122 MMHG | TEMPERATURE: 97.5 F | WEIGHT: 315 LBS | HEIGHT: 76 IN | DIASTOLIC BLOOD PRESSURE: 70 MMHG | BODY MASS INDEX: 38.36 KG/M2

## 2021-09-29 DIAGNOSIS — D75.1 POLYCYTHEMIA: ICD-10-CM

## 2021-09-29 DIAGNOSIS — K75.81 NASH (NONALCOHOLIC STEATOHEPATITIS): Primary | ICD-10-CM

## 2021-09-29 DIAGNOSIS — K21.9 GASTROESOPHAGEAL REFLUX DISEASE WITHOUT ESOPHAGITIS: ICD-10-CM

## 2021-09-29 DIAGNOSIS — R19.4 CHANGE IN BOWEL HABITS: ICD-10-CM

## 2021-09-29 PROCEDURE — 99213 OFFICE O/P EST LOW 20 MIN: CPT | Performed by: INTERNAL MEDICINE

## 2021-09-29 NOTE — PROGRESS NOTES
Follow Up Note     Date: 2021   Patient Name: Raul Welch  MRN: [id number]  : 1975     Referring Physician: Domi Strickland APRN    Chief Complaint:    Chief Complaint   Patient presents with   • Follow-up   • RUIZ (nonalcoholic steatohepatitis)       Interval History:   2021  Raul Welch is a 46 y.o. male who is here today for follow up for follow-up of his fatty liver disease.  He states that he occasionally gets right-sided flank pain and bloating sensation and gets better after burping.  Otherwise he could not lose any weight at this time.     2021  Raul Welch is a 45 y.o. male who is here today for follow up for elevated LFTS.  He states that he lost about 35 pounds over 1 year now.   He denies any abdominal pain.  His diarrhea has improved now most passing solid stool, not use any loperamide.  His reflux symptoms also well controlled with the Protonix.  He did follow-up with the bariatrics however his insurance did not cover his bariatric surgery and could not proceed with that.  He is here to discuss his lab work done and further follow-up and management.     10/27/2020  Raul Welch is a 45 y.o. male who is here today to establish care with Gastroenterology for evaluation of elevated liver enzymes, fatty liver disease, splenomegaly.   Had recent lab work done and the ultrasound of the abdomen which revealed fatty liver disease and also splenomegaly and he is here to get further work-up on that.      He has been having issues with intermittent nausea vomiting and diarrhea since about 6 moths. He will have BM anywhere 3-4 times per day. Loose to soft. Also has associated Left sided abdominal pain.   This patient deny any hematochezia or melena.  Weight is stable. Pt has nausea vomiting intermittently but no or odynophagia or dysphagia. There is a history of acid reflux,m takes OTC antacids, not helping him much. There is no history of anemia. No prior  history of EGD. Last colonoscopy was 2018, polyp removed . No family history of colon cancer or any GI malignancy. No history of any abdominal surgery. Denies alcohol abuse or cigarette smoking.     Subjective      Past Medical History:   Past Medical History:   Diagnosis Date   • Arthritis    • Asthma     SPOUSE REPORTS ISSUES AS A CHILD   • Back pain    • Bell's palsy    • Blood in stool    • Depression    • Diabetes (CMS/HCC)    • Diarrhea     WITH ABDOMINAL CRAMPING   • Diverticulitis    • Elevated cholesterol    • GERD (gastroesophageal reflux disease)    • Headache    • High cholesterol    • History of being tatooed    • History of bronchitis    • History of fracture     HX OF FOOT (SPOUSE UNSURE LATERALITY)   • History of gout    • Hypertension    • Myocardial infarction (CMS/HCC) 1997   • Sleep apnea     USES BIPAP HS - TO BRING IN THE DOS   • Tattoos    • Wears glasses      Past Surgical History:   Past Surgical History:   Procedure Laterality Date   • APPENDECTOMY  1990   • CARDIAC CATHETERIZATION     • CARDIAC CATHETERIZATION N/A 9/30/2017    Procedure: Coronary angiography;  Surgeon: Zander Bobo MD;  Location: [facility] CATH INVASIVE LOCATION;  Service:    • COLONOSCOPY N/A 6/25/2018    Procedure: COLONOSCOPY with hot snare polypectomy,  cold snare polypectomy, cold biopsy polypectomies, and biopsies;  Surgeon: Sandro Potter MD;  Location: [facility] ENDOSCOPY;  Service: Gastroenterology   • KNEE SURGERY Left     removed infection/mass in knee   • OTHER SURGICAL HISTORY      SPOUSE REPORTS GROWTH REMOVED FROM THROAT WHEN PATIENT WAS A YOUNG CHILD       Family History:   Family History   Problem Relation Age of Onset   • Diabetes Mother    • Alcohol abuse Father    • Diabetes Father    • Heart disease Father    • Colon cancer Neg Hx    • Cirrhosis Neg Hx    • Liver cancer Neg Hx    • Liver disease Neg Hx        Social History:   Social History     Socioeconomic History   • Marital status:       "Spouse name: Not on file   • Number of children: Not on file   • Years of education: Not on file   • Highest education level: Not on file   Tobacco Use   • Smoking status: Former Smoker     Packs/day: 0.25     Years: 0.50     Pack years: 0.12     Types: Cigarettes     Quit date:      Years since quittin.7   • Smokeless tobacco: Never Used   • Tobacco comment: SPOUSE REPORTS PATIENT SMOKED VERY BRIEFLY WITH NO ADDICTIVE HABIT AS A TEENAGER. QUIT OVER 25 YEARS AGO.   Vaping Use   • Vaping Use: Never used   Substance and Sexual Activity   • Alcohol use: Never   • Drug use: Never   • Sexual activity: Defer       Medications:     Current Outpatient Medications:   •  atorvastatin (LIPITOR) 40 MG tablet, TAKE 1 TABLET BY MOUTH EVERY DAY , Disp: 90 tablet, Rfl: 1  •  B-D UF III MINI PEN NEEDLES 31G X 5 MM misc, USE ONCE DAILY WITH INSULIN INJECTIONS AS DIRECTED, Disp: , Rfl: 11  •  Blood Glucose Monitoring Suppl device, Use as directed to check blood sugar, Disp: 1 each, Rfl: 0  •  Canagliflozin (Invokana) 300 MG tablet tablet, Take 1 tablet by mouth Daily., Disp: 90 tablet, Rfl: 1  •  carvedilol (COREG) 25 MG tablet, Take 25 mg by mouth 2 (Two) Times a Day. Take with food, Disp: , Rfl:   •  cetirizine (ZyrTEC) 10 MG tablet, Take 12 mg by mouth Daily., Disp: , Rfl:   •  citalopram (CeleXA) 20 MG tablet, Take 20 mg by mouth Daily., Disp: , Rfl:   •  glucose blood test strip, Use as directed to check blood sugar, Disp: 100 each, Rfl: 11  •  glyburide (DIAbeta) 5 MG tablet, Take 2 tablets by mouth Daily With Breakfast., Disp: 180 tablet, Rfl: 1  •  hydrochlorothiazide (HYDRODIURIL) 25 MG tablet, Take 25 mg by mouth Daily., Disp: , Rfl:   •  icosapent ethyl (Vascepa) 1 g capsule capsule, Take 2 g by mouth 2 (Two) Times a Day With Meals., Disp: 360 capsule, Rfl: 1  •  Insulin Pen Needle (PEN NEEDLES 1/2\") 29G X 12MM misc, Use as directed to inject insulin, Disp: 100 each, Rfl: 11  •  Insulin Pen Needle 32G X 6 MM misc, " Use daily with insulin, Disp: 100 each, Rfl: 11  •  Lantus SoloStar 100 UNIT/ML injection pen, Inject 70 Units under the skin into the appropriate area as directed Every Night., Disp: 72 mL, Rfl: 1  •  lisinopril (PRINIVIL,ZESTRIL) 40 MG tablet, Take 40 mg by mouth Daily., Disp: , Rfl:   •  metFORMIN (GLUCOPHAGE) 1000 MG tablet, TAKE 1 TABLET BY MOUTH TWO TIMES A DAY WITH MEALS, Disp: 180 tablet, Rfl: 1  •  modafinil (PROVIGIL) 100 MG tablet, TAKE 1 TABLET BY MOUTH ONE TIME A DAY , Disp: 30 tablet, Rfl: 0  •  nystatin-triamcinolone (MYCOLOG) 432055-4.1 UNIT/GM-% ointment, Apply to affected area 2 times daily, Disp: 30 g, Rfl: 1  •  Omega 3 1200 MG capsule, Take 1,000 mg by mouth 2 (Two) Times a Day., Disp: , Rfl:   •  ondansetron ODT (ZOFRAN-ODT) 4 MG disintegrating tablet, Place 1 tablet on the tongue Every 8 (Eight) Hours As Needed for Nausea or Vomiting., Disp: 12 tablet, Rfl: 0  •  pantoprazole (PROTONIX) 20 MG EC tablet, TAKE 1 TABLET BY MOUTH EVERY DAY , Disp: 30 tablet, Rfl: 0  •  Trulicity 1.5 MG/0.5ML solution pen-injector, INJECT 1.5MG UNDER THE SKIN INTO THE APPROPRIATE AREA AS DIRECTED ONE TIME PER WEEK , Disp: 6 mL, Rfl: 1  •  meclizine (ANTIVERT) 25 MG tablet, Take 1 tablet by mouth 3 (Three) Times a Day As Needed for Dizziness., Disp: 20 tablet, Rfl: 2    Allergies:   No Known Allergies    Review of Systems:   Review of Systems   Constitutional: Negative for appetite change, fatigue, fever and unexpected weight loss.   HENT: Negative for trouble swallowing.    Gastrointestinal: Positive for abdominal pain. Negative for abdominal distention, anal bleeding, blood in stool, constipation, diarrhea, nausea, rectal pain, vomiting, GERD and indigestion.       The following portions of the patient's history were reviewed and updated as appropriate: allergies, current medications, past family history, past medical history, past social history, past surgical history and problem list.    Objective     Physical  "Exam:  Vital Signs:   Vitals:    09/29/21 1524   BP: 122/70   Temp: 97.5 °F (36.4 °C)   TempSrc: Infrared   Weight: (!) 170 kg (373 lb 12.8 oz)   Height: 193 cm (76\")       Physical Exam  Constitutional:       Appearance: He is obese.   HENT:      Head: Normocephalic and atraumatic.   Eyes:      Conjunctiva/sclera: Conjunctivae normal.   Abdominal:      General: Abdomen is flat. There is no distension.      Palpations: There is no mass.      Tenderness: There is no abdominal tenderness. There is no guarding or rebound.      Hernia: No hernia is present.   Musculoskeletal:      Cervical back: Normal range of motion and neck supple.   Neurological:      Mental Status: He is alert.         Results Review:   I reviewed the patient's new clinical results.    Office Visit on 09/15/2021   Component Date Value Ref Range Status   • Glucose 09/15/2021 272* 70 - 130 mg/dL Final   • Hemoglobin A1C 09/15/2021 10.1  % Final      No radiology results for the last 90 days.    Assessment / Plan      1.  Nonalcoholic steatohepatitis  Grey fibrosure; F0-F1 fibrosis, N1 steatohepatitis, S2 steatosis  2. Morbid obesity (CMS/HCC)  3. Splenomegaly/suspected mild portal hypertension versus relative enlargement with this morbid obesity  9/29/2021  He could not lose any more weight this time.  His lab studies done in August 2021 reviewed which reveal a normal liver enzymes.  Normal hemoglobin with normal platelets.  Previously declined bariatric surgery by insurance  Encouraged to lose at least 20 to 30 pounds for the next 1 year time.   Follow-up labs in 1 year with a CBC CMP PT/INR    2/1/2021  He is not immune to hepatitis A and B.  Hep C antibody is negative.  ABEL anti-smooth muscle antibody, antimitochondrial antibody were negative.  Iron studies not consistent with hemochromatosis with a normal iron saturation.  Alpha-1 antitrypsin deficiency level and genotype were normal.  Ceruloplasmin levels normal.  His elevation of liver enzymes " is secondary to nonalcoholic steatohepatitis   He lost about 35 pounds since 1 year.. His insurance did not cover for his bariatric surgery and he did not proceed with that.  His recent CMP revealed normal liver enzymes, expected result from his 35 pound weight loss.  We will repeat CMP in 6 months time  Advised to continue to lose weight at least 30 to 40 pounds in 4 months time  Prescription given for hepatitis A and hepatitis B combined vaccine with a booster     10/27/2020   patient has a borderline elevated liver enzymes with a normal total bilirubin normal alkaline phosphatase and normal platelets and albumin.  Recent ultrasound abdomen revealed fatty liver disease with splenomegaly suggesting development of portal hypertension.  He is morbidly obese metabolic syndrome  Some of his medication also elevated liver enzymes including the statins and Celexa.  He is nonalcoholic non-smoker.  This patient is extremely high risk for progression of the liver disease and cirrhosis.  He states that he Lost about 25 pouds last 6 months but he is nowhere near the target.  His recent ultrasound abdomen CT scan abdomen pelvis and recent lab works reviewed.  We had a question on Mediterranean diet  He also need basic liver work-up to rule out other cause of elevated liver enzymes  He also has a significant issues with obstructive sleep apnea , diabetes mellitus , joint pains, he will likely need bariatric surgery and weight reduction.   We will repeat up to bariatrics for possible gastric sleeve       4. Polycythemia  9/29/2021  Recent hemoglobin this year between 15 to 16 g/dL.  We will monitor  10/27/2020   his splenomegaly appears to be secondary to mild portal hypertension from his fatty liver disease possible Grey.  However he also has a polycythemia with a hemoglobin of 18.9 g/dL.  I am not entirely clear whether there is any other hematological process going on causing polycythemia splenomegaly.   We will repeat CBC  today and if it is still high will refer him to allergy /oncology     5. Gastroesophageal reflux disease without esophagitis  6. Change in bowel habits  9/29/2021  Patient change in bowel habit in the form of alternating loose stools with a normal stool is mostly associated with a diabetes mellitus on Metformin use.  History significant abdominal bloating is also partly related with Metformin use.  As far as the reflux symptoms concerned he does not have any current reflux symptoms   Advised to continue Protonix 20 mg p.o. as needed    2/1/2021  He changed his diet significantly, then his bowel movements have improved now he is almost passing solid stools daily.  His reflux symptoms are under control with the Protonix 40 g p.o. daily.  No more nausea or vomiting.  We will reduce his Protonix dose to 20 mg p.o. daily for now, will consider discontinuing and make it as needed next visit if his symptoms completely under control.  Prescription given with the 5-month refill     10/27/2020  This is mostly associated with his diabetes mellitus.  He also has a history and clinical examination findings suggestive of irritable bowel syndrome with the diarrhea.  He always has left sided abdominal pain with the bloating.  This is associated with nausea and vomiting intermittently with the loose stools.   His Metformin and diabetes definitely contributing to his some of the symptoms.  He had a colonoscopy done in 2018 with random colon biopsies and terminal biopsies were normal.  He is managing these with the Zofran and he thinks that he does not need any medication at this time  His not taking any prescription medication for his acid reflux, states that over-the-counter antacids is not helping.  We will start him on Prilosec 40 mils p.o. daily and discontinue after 8 weeks if his symptoms improve  He may need an EGD if his symptoms does not improve     Prior history  7. Personal history of colonic polyps  Colonoscopy done in  2018 revealed multiple colon polyps however only one of the polyp was tubular adenoma  He is due for surveillance colonoscopy in 2023    Follow Up:   No follow-ups on file.    Jhoana Snider MD  [facility] [place]  9/29/2021  15:30 EDT     Please note that portions of this note may have been completed with a voice recognition program.

## 2021-10-04 ENCOUNTER — TREATMENT (OUTPATIENT)
Dept: PHYSICAL THERAPY | Facility: CLINIC | Age: 46
End: 2021-10-04

## 2021-10-04 ENCOUNTER — TELEPHONE (OUTPATIENT)
Dept: NEUROLOGY | Facility: CLINIC | Age: 46
End: 2021-10-04

## 2021-10-04 DIAGNOSIS — R42 VERTIGO: Primary | ICD-10-CM

## 2021-10-04 DIAGNOSIS — R42 DIZZINESS: ICD-10-CM

## 2021-10-04 DIAGNOSIS — R42 DIZZINESS: Primary | ICD-10-CM

## 2021-10-04 PROCEDURE — 97112 NEUROMUSCULAR REEDUCATION: CPT | Performed by: PHYSICAL THERAPIST

## 2021-10-04 PROCEDURE — 97110 THERAPEUTIC EXERCISES: CPT | Performed by: PHYSICAL THERAPIST

## 2021-10-04 NOTE — PROGRESS NOTES
Physical Therapy Daily Progress Note / Re-assessment / Discharge Summary     TOTAL TIME: 40 MINUTES    Raul Welch reports: feeling a lot better, do not really get dizzy anymore; feel a whole lot more stable; no c/o dizziness with transfers; work is easier to do       Objective   See Exercise, Manual, and Modality Logs for complete treatment.     THERAPEUTIC EXERCISES/ACTIVITIES ADDED TODAY: green tband lat pulls and rows    Cervical ROM is WFL, no pain c/o and no dizziness      SHORT TERM GOALS:     3 weeks  1. Pt independent with HEP  2. Pt to demonstrate cervical AROM 50-75% of expected norms to allow for improved ability to perform ADL's  3. Pt to report not having any headaches related to neck pain for the past 3 days    LONG TERM GOALS:   6 weeks  1. Pt to demonstrate cervical AROM % of expected norms to allow for improved safety when driving  2. Pt to demonstrate ability perform all transfers with no dizziness.   3. Pt to report being able to work full shift or work in the home without increase in pain in the neck or head and with no dizziness.     Assessment/Plan  Patient has made good progress towards IE goals; he is doing well at this time and will call back if major symptoms return; will consider discharged at this time    Other  Discharged ; may re-evaluate in near future if symptoms return          Manual Therapy:         mins  99822;  Therapeutic Exercise:    20     mins  34540;     Neuromuscular Pamela:    20    mins  18029;    Therapeutic Activity:          mins  47796;     Gait Training:           mins  91132;     Ultrasound:          mins  29032;    Electrical Stimulation:         mins  71914 ( );  Dry Needling          mins self-pay    Timed Treatment:   40   mins   Total Treatment:     40   mins    RENETTA Alford PT  Physical Therapist

## 2021-10-10 DIAGNOSIS — E11.42 WELL CONTROLLED TYPE 2 DIABETES MELLITUS WITH PERIPHERAL NEUROPATHY (HCC): ICD-10-CM

## 2021-10-10 DIAGNOSIS — E78.2 MIXED HYPERLIPIDEMIA: ICD-10-CM

## 2021-10-11 RX ORDER — PANTOPRAZOLE SODIUM 20 MG/1
TABLET, DELAYED RELEASE ORAL
Qty: 30 TABLET | Refills: 0 | Status: SHIPPED | OUTPATIENT
Start: 2021-10-11 | End: 2021-11-09

## 2021-10-11 RX ORDER — ATORVASTATIN CALCIUM 40 MG/1
TABLET, FILM COATED ORAL
Qty: 90 TABLET | Refills: 1 | Status: SHIPPED | OUTPATIENT
Start: 2021-10-11 | End: 2022-02-07

## 2021-10-19 ENCOUNTER — PRIOR AUTHORIZATION (OUTPATIENT)
Dept: ENDOCRINOLOGY | Facility: CLINIC | Age: 46
End: 2021-10-19

## 2021-11-09 ENCOUNTER — OFFICE VISIT (OUTPATIENT)
Dept: NEUROLOGY | Facility: CLINIC | Age: 46
End: 2021-11-09

## 2021-11-09 VITALS
BODY MASS INDEX: 38.36 KG/M2 | HEIGHT: 76 IN | HEART RATE: 73 BPM | OXYGEN SATURATION: 95 % | WEIGHT: 315 LBS | TEMPERATURE: 97.3 F | DIASTOLIC BLOOD PRESSURE: 70 MMHG | SYSTOLIC BLOOD PRESSURE: 120 MMHG

## 2021-11-09 DIAGNOSIS — G47.33 OSA TREATED WITH BIPAP: Primary | ICD-10-CM

## 2021-11-09 PROCEDURE — 99213 OFFICE O/P EST LOW 20 MIN: CPT | Performed by: NURSE PRACTITIONER

## 2021-11-09 RX ORDER — PANTOPRAZOLE SODIUM 20 MG/1
TABLET, DELAYED RELEASE ORAL
Qty: 30 TABLET | Refills: 0 | Status: SHIPPED | OUTPATIENT
Start: 2021-11-09 | End: 2021-12-09

## 2021-11-09 NOTE — PROGRESS NOTES
Follow Up Office Visit      Patient Name: Raul Welch  : 1975   MRN: [id number]     Chief Complaint:    Chief Complaint   Patient presents with   • Follow-up     patient in office to follow up on gonzález.        History of Present Illness: Raul Welch is a 46 y.o. male who is here today to follow up with GONZÁLEZ and was last seen on 2021 for vertigo.  He is currently on Bipap therapy-a current compliance report has been requested for review.  He feels he is tolerating his pressures pretty well.  He is sleeping well.  He is sleeping about 5-7 hours per night.  He is using a nasal mask and [employer].  Additional risk factors- BMI 44, dyslipidemia, diabetes, HTN, GERD.     Pertinent Medical History:  Raul Welch is a 45 y.o. male who is here today to follow up with vertigo.  He was last seen on 2021.  He has not been to PT yet because his appointment had to be rescheduled.  He has PT scheduled for next Tuesday.  He has not had to take the Meclizine.  He has not had any spinning episodes happen since his last visit.  EEG on 2021 was normal.  He says he had his MRI brain done at an outside facility-those results are not available for review today.      He asks if anyone has sent over paperwork to his medical supply company (was [employer]).  He says he is getting multiple calls from them or someone claiming to be one of their representatives.       Subjective      Review of Systems:   Review of Systems   Constitutional: Negative for chills, fatigue and fever.   HENT: Negative for facial swelling, hearing loss, sore throat, tinnitus and trouble swallowing.    Eyes: Negative for blurred vision, double vision, photophobia and visual disturbance.   Respiratory: Positive for apnea. Negative for cough, chest tightness and shortness of breath.    Cardiovascular: Negative for chest pain, palpitations and leg swelling.   Gastrointestinal: Negative for abdominal pain, nausea and vomiting.    Endocrine: Negative for cold intolerance and heat intolerance.   Musculoskeletal: Negative for gait problem, neck pain and neck stiffness.   Skin: Negative for color change and rash.   Allergic/Immunologic: Negative for environmental allergies and food allergies.   Neurological: Negative for dizziness, syncope, speech difficulty, weakness, light-headedness, numbness, headache and memory problem.   Psychiatric/Behavioral: Negative for behavioral problems, sleep disturbance and depressed mood. The patient is not nervous/anxious.        I have reviewed and the following portions of the patient's history were updated as appropriate: past family history, past medical history, past social history, past surgical history and problem list.    Medications:     Current Outpatient Medications:   •  atorvastatin (LIPITOR) 40 MG tablet, TAKE 1 TABLET BY MOUTH EVERY DAY, Disp: 90 tablet, Rfl: 1  •  B-D UF III MINI PEN NEEDLES 31G X 5 MM misc, USE ONCE DAILY WITH INSULIN INJECTIONS AS DIRECTED, Disp: , Rfl: 11  •  Blood Glucose Monitoring Suppl device, Use as directed to check blood sugar, Disp: 1 each, Rfl: 0  •  Canagliflozin (Invokana) 300 MG tablet tablet, Take 1 tablet by mouth Daily., Disp: 90 tablet, Rfl: 1  •  carvedilol (COREG) 25 MG tablet, Take 25 mg by mouth 2 (Two) Times a Day. Take with food, Disp: , Rfl:   •  cetirizine (ZyrTEC) 10 MG tablet, Take 12 mg by mouth Daily., Disp: , Rfl:   •  citalopram (CeleXA) 20 MG tablet, Take 20 mg by mouth Daily., Disp: , Rfl:   •  Ertugliflozin L-PyroglutamicAc (Steglatro) 15 MG tablet, Take  by mouth Every Morning., Disp: , Rfl:   •  glyburide (DIAbeta) 5 MG tablet, Take 2 tablets by mouth Daily With Breakfast., Disp: 180 tablet, Rfl: 1  •  hydrochlorothiazide (HYDRODIURIL) 25 MG tablet, Take 25 mg by mouth Daily., Disp: , Rfl:   •  icosapent ethyl (Vascepa) 1 g capsule capsule, Take 2 g by mouth 2 (Two) Times a Day With Meals., Disp: 360 capsule, Rfl: 1  •  Insulin Pen Needle  "(PEN NEEDLES 1/2\") 29G X 12MM misc, Use as directed to inject insulin, Disp: 100 each, Rfl: 11  •  Insulin Pen Needle 32G X 6 MM misc, Use daily with insulin, Disp: 100 each, Rfl: 11  •  Lantus SoloStar 100 UNIT/ML injection pen, Inject 70 Units under the skin into the appropriate area as directed Every Night., Disp: 72 mL, Rfl: 1  •  lisinopril (PRINIVIL,ZESTRIL) 40 MG tablet, Take 40 mg by mouth Daily., Disp: , Rfl:   •  meclizine (ANTIVERT) 25 MG tablet, Take 1 tablet by mouth 3 (Three) Times a Day As Needed for Dizziness., Disp: 20 tablet, Rfl: 2  •  metFORMIN (GLUCOPHAGE) 1000 MG tablet, TAKE 1 TABLET BY MOUTH TWO TIMES A DAY WITH MEALS, Disp: 180 tablet, Rfl: 1  •  modafinil (PROVIGIL) 100 MG tablet, TAKE 1 TABLET BY MOUTH ONE TIME A DAY , Disp: 30 tablet, Rfl: 0  •  nystatin-triamcinolone (MYCOLOG) 376984-2.1 UNIT/GM-% ointment, Apply to affected area 2 times daily, Disp: 30 g, Rfl: 1  •  Omega 3 1200 MG capsule, Take 1,000 mg by mouth 2 (Two) Times a Day., Disp: , Rfl:   •  ondansetron ODT (ZOFRAN-ODT) 4 MG disintegrating tablet, Place 1 tablet on the tongue Every 8 (Eight) Hours As Needed for Nausea or Vomiting., Disp: 12 tablet, Rfl: 0  •  pantoprazole (PROTONIX) 20 MG EC tablet, TAKE 1 TABLET BY MOUTH EVERY DAY, Disp: 30 tablet, Rfl: 0  •  Trulicity 1.5 MG/0.5ML solution pen-injector, INJECT 1.5MG UNDER THE SKIN INTO THE APPROPRIATE AREA AS DIRECTED ONE TIME PER WEEK , Disp: 6 mL, Rfl: 1  •  glucose blood test strip, Use as directed to check blood sugar, Disp: 100 each, Rfl: 11    Allergies:   No Known Allergies    Objective     Physical Exam:  Vital Signs:   Vitals:    11/09/21 1452   BP: 120/70   BP Location: Left arm   Patient Position: Sitting   Cuff Size: Adult   Pulse: 73   Temp: 97.3 °F (36.3 °C)   SpO2: 95%   Weight: (!) 166 kg (366 lb 6.4 oz)   Height: 193 cm (76\")   PainSc:   3   PainLoc: Head  Comment: headaches     Body mass index is 44.6 kg/m².    Physical Exam  Vitals and nursing note " reviewed.   Constitutional:       General: He is not in acute distress.     Appearance: He is well-developed. He is obese. He is not diaphoretic.   HENT:      Head: Normocephalic and atraumatic.   Eyes:      Conjunctiva/sclera: Conjunctivae normal.      Pupils: Pupils are equal, round, and reactive to light.   Neck:      Trachea: Trachea normal.   Pulmonary:      Effort: Pulmonary effort is normal. No respiratory distress.   Skin:     General: Skin is dry.      Findings: No rash.   Neurological:      Mental Status: He is alert and oriented to person, place, and time.   Psychiatric:         Mood and Affect: Mood normal.         Behavior: Behavior normal.         Thought Content: Thought content normal.         Judgment: Judgment normal.         Neurologic Exam     Mental Status   Oriented to person, place, and time.     Cranial Nerves     CN III, IV, VI   Pupils are equal, round, and reactive to light.       Assessment / Plan      Assessment/Plan:   Diagnoses and all orders for this visit:    1. GONZÁLEZ treated with BiPAP (Primary)    2. BMI 40.0-44.9, adult (HCC)    *Will call patient and discuss compliance report once it is received from DME.    *Encouraged patient to use his BiPap for at least 5 hours every night.     Follow Up:   Return in about 1 year (around 11/9/2022) for F/U Obstructive Sleep Apnea.    HUMPHREY Michele, FNP-C  [facility]       Please note that portions of this note may have been completed with a voice recognition program. Efforts were made to edit the dictations, but occasionally words are mistranscribed.

## 2021-12-09 DIAGNOSIS — E11.649 CONTROLLED TYPE 2 DIABETES MELLITUS WITH HYPOGLYCEMIA, WITH LONG-TERM CURRENT USE OF INSULIN (HCC): ICD-10-CM

## 2021-12-09 DIAGNOSIS — Z79.4 CONTROLLED TYPE 2 DIABETES MELLITUS WITH HYPOGLYCEMIA, WITH LONG-TERM CURRENT USE OF INSULIN (HCC): ICD-10-CM

## 2021-12-09 RX ORDER — PANTOPRAZOLE SODIUM 20 MG/1
TABLET, DELAYED RELEASE ORAL
Qty: 30 TABLET | Refills: 0 | Status: SHIPPED | OUTPATIENT
Start: 2021-12-09

## 2021-12-09 RX ORDER — INSULIN GLARGINE 100 [IU]/ML
INJECTION, SOLUTION SUBCUTANEOUS
Qty: 30 ML | Refills: 0 | Status: SHIPPED | OUTPATIENT
Start: 2021-12-09 | End: 2022-01-10

## 2021-12-10 ENCOUNTER — TELEPHONE (OUTPATIENT)
Dept: ENDOCRINOLOGY | Facility: CLINIC | Age: 46
End: 2021-12-10

## 2022-01-08 DIAGNOSIS — E11.649 CONTROLLED TYPE 2 DIABETES MELLITUS WITH HYPOGLYCEMIA, WITH LONG-TERM CURRENT USE OF INSULIN: ICD-10-CM

## 2022-01-08 DIAGNOSIS — Z79.4 CONTROLLED TYPE 2 DIABETES MELLITUS WITH HYPOGLYCEMIA, WITH LONG-TERM CURRENT USE OF INSULIN: ICD-10-CM

## 2022-01-08 DIAGNOSIS — E11.65 UNCONTROLLED TYPE 2 DIABETES MELLITUS WITH HYPERGLYCEMIA: Chronic | ICD-10-CM

## 2022-01-10 RX ORDER — CANAGLIFLOZIN 300 MG/1
TABLET, FILM COATED ORAL
Qty: 90 TABLET | Refills: 0 | Status: SHIPPED | OUTPATIENT
Start: 2022-01-10 | End: 2022-04-08

## 2022-01-10 RX ORDER — INSULIN GLARGINE 100 [IU]/ML
INJECTION, SOLUTION SUBCUTANEOUS
Qty: 72 ML | Refills: 0 | Status: SHIPPED | OUTPATIENT
Start: 2022-01-10

## 2022-01-10 NOTE — TELEPHONE ENCOUNTER
LOv 9/15/2021    Pt scheduled a follow up appt with the front office , however he is trying to find a new doctor closer to [place] due to transportation issues. He is aware that appt needs to be kept or he needs to get another doctor to write his prescriptions.

## 2022-02-07 DIAGNOSIS — Z79.4 CONTROLLED TYPE 2 DIABETES MELLITUS WITH HYPOGLYCEMIA, WITH LONG-TERM CURRENT USE OF INSULIN: ICD-10-CM

## 2022-02-07 DIAGNOSIS — E11.42 WELL CONTROLLED TYPE 2 DIABETES MELLITUS WITH PERIPHERAL NEUROPATHY: ICD-10-CM

## 2022-02-07 DIAGNOSIS — E11.65 UNCONTROLLED TYPE 2 DIABETES MELLITUS WITH HYPERGLYCEMIA: Chronic | ICD-10-CM

## 2022-02-07 DIAGNOSIS — E11.649 CONTROLLED TYPE 2 DIABETES MELLITUS WITH HYPOGLYCEMIA, WITH LONG-TERM CURRENT USE OF INSULIN: ICD-10-CM

## 2022-02-07 DIAGNOSIS — E78.2 MIXED HYPERLIPIDEMIA: ICD-10-CM

## 2022-02-07 RX ORDER — GLYBURIDE 5 MG/1
10 TABLET ORAL
Qty: 180 TABLET | Refills: 1 | Status: SHIPPED | OUTPATIENT
Start: 2022-02-07 | End: 2022-07-11

## 2022-02-07 RX ORDER — ATORVASTATIN CALCIUM 40 MG/1
TABLET, FILM COATED ORAL
Qty: 90 TABLET | Refills: 1 | Status: SHIPPED | OUTPATIENT
Start: 2022-02-07 | End: 2022-07-11

## 2022-02-07 RX ORDER — DULAGLUTIDE 1.5 MG/.5ML
INJECTION, SOLUTION SUBCUTANEOUS
Qty: 6 ML | Refills: 1 | Status: SHIPPED | OUTPATIENT
Start: 2022-02-07 | End: 2022-10-26

## 2022-03-08 ENCOUNTER — OFFICE VISIT (OUTPATIENT)
Dept: ENDOCRINOLOGY | Facility: CLINIC | Age: 47
End: 2022-03-08

## 2022-03-08 VITALS
WEIGHT: 315 LBS | HEIGHT: 76 IN | DIASTOLIC BLOOD PRESSURE: 72 MMHG | BODY MASS INDEX: 38.36 KG/M2 | OXYGEN SATURATION: 95 % | SYSTOLIC BLOOD PRESSURE: 140 MMHG | HEART RATE: 79 BPM

## 2022-03-08 DIAGNOSIS — E11.65 UNCONTROLLED TYPE 2 DIABETES MELLITUS WITH HYPERGLYCEMIA: Primary | Chronic | ICD-10-CM

## 2022-03-08 DIAGNOSIS — E78.2 MIXED HYPERLIPIDEMIA: Chronic | ICD-10-CM

## 2022-03-08 LAB
EXPIRATION DATE: NORMAL
GLUCOSE BLDC GLUCOMTR-MCNC: 157 MG/DL (ref 70–130)
HBA1C MFR BLD: 7.5 %
Lab: NORMAL

## 2022-03-08 PROCEDURE — 99214 OFFICE O/P EST MOD 30 MIN: CPT | Performed by: PHYSICIAN ASSISTANT

## 2022-03-08 PROCEDURE — 82947 ASSAY GLUCOSE BLOOD QUANT: CPT | Performed by: PHYSICIAN ASSISTANT

## 2022-03-08 PROCEDURE — 83036 HEMOGLOBIN GLYCOSYLATED A1C: CPT | Performed by: PHYSICIAN ASSISTANT

## 2022-03-08 PROCEDURE — 3051F HG A1C>EQUAL 7.0%<8.0%: CPT | Performed by: PHYSICIAN ASSISTANT

## 2022-03-08 NOTE — PROGRESS NOTES
Chief Complaint  F/u for Diabetes Mellitus.    HPI   Raul Welch is a 46 y.o. male who is here today for f/u of Diabetes Mellitus type 2. The initial diagnosis of diabetes was made in his 20s.     Will be making appt with PCP in the next week or so for labs.     A1C- 7.5 (3/8/2022), 10.1 (9/15/2021), 6.4 (5/24/2021), 6.3 (1/22/2021), 6.1 (9/21/2020), 9 (6/10/2020), > 11 (10/30/19), 10.3 (9/5/19)    Diabetic complications: peripheral neuropathy  Eye exam current (within one year): updated 11/2021 - pt reports no retinopathy   Foot care and dental care: discussed    Current diabetic medications include:  Metformin 1000mg BID  Invokana 300 mg daily  Glybride 5 mg 2 pill daily AC  Trulicity 1.5 mg weekly  Lantus 70u qhs    Statin: lipitor 40, changed from zocor 20 6/2020, vascepa started 6/2020    Past medications: ozempic 0.5 mg (changed to trulicity due to insurance preference), basaglar, Steglatro, glyburide    Diabetic Monitoring  -   Meter readings reviewed, he is testing 1-2 times per day, readings range from 123-371 (down from mostly 200-300s last visit), no hypoglycemia      Freestyle Jose was expensive.     The following portions of the patient's history were reviewed and updated by me as appropriate: allergies, current medications, past family history, past social history, past surgical history and problem list.      Past Medical History:   Diagnosis Date   • Arthritis    • Asthma     SPOUSE REPORTS ISSUES AS A CHILD   • Back pain    • Bell's palsy    • Blood in stool    • Depression    • Diabetes (HCC)    • Diarrhea     WITH ABDOMINAL CRAMPING   • Diverticulitis    • Elevated cholesterol    • GERD (gastroesophageal reflux disease)    • Headache    • High cholesterol    • History of being tatooed    • History of bronchitis    • History of fracture     HX OF FOOT (SPOUSE UNSURE LATERALITY)   • History of gout    • Hypertension    • Myocardial infarction ([facility]) 1997   • Sleep apnea     USES BIPAP HS - TO  "BRING IN THE DOS   • Tattoos    • Wears glasses        Medications    Current Outpatient Medications:   •  atorvastatin (LIPITOR) 40 MG tablet, TAKE 1 TABLET BY MOUTH EVERY DAY, Disp: 90 tablet, Rfl: 1  •  B-D UF III MINI PEN NEEDLES 31G X 5 MM misc, USE ONCE DAILY WITH INSULIN INJECTIONS AS DIRECTED, Disp: , Rfl: 11  •  Blood Glucose Monitoring Suppl device, Use as directed to check blood sugar, Disp: 1 each, Rfl: 0  •  carvedilol (COREG) 25 MG tablet, Take 25 mg by mouth 2 (Two) Times a Day. Take with food, Disp: , Rfl:   •  cetirizine (ZyrTEC) 10 MG tablet, Take 12 mg by mouth Daily., Disp: , Rfl:   •  citalopram (CeleXA) 20 MG tablet, Take 20 mg by mouth Daily., Disp: , Rfl:   •  glucose blood test strip, Use as directed to check blood sugar, Disp: 100 each, Rfl: 11  •  glyburide (DIAbeta) 5 MG tablet, TAKE 2 TABLETS BY MOUTH DAILY WITH BREAKFAST, Disp: 180 tablet, Rfl: 1  •  hydrochlorothiazide (HYDRODIURIL) 25 MG tablet, Take 25 mg by mouth Daily., Disp: , Rfl:   •  icosapent ethyl (Vascepa) 1 g capsule capsule, Take 2 g by mouth 2 (Two) Times a Day With Meals., Disp: 360 capsule, Rfl: 1  •  Insulin Pen Needle (PEN NEEDLES 1/2\") 29G X 12MM misc, Use as directed to inject insulin, Disp: 100 each, Rfl: 11  •  Insulin Pen Needle 32G X 6 MM misc, Use daily with insulin, Disp: 100 each, Rfl: 11  •  Invokana 300 MG tablet tablet, TAKE 1 TABLET BY MOUTH EVERY DAY, Disp: 90 tablet, Rfl: 0  •  Lantus SoloStar 100 UNIT/ML injection pen, inject 80 units under the skin into the appropriate area as directed every night, Disp: 72 mL, Rfl: 0  •  lisinopril (PRINIVIL,ZESTRIL) 40 MG tablet, Take 40 mg by mouth Daily., Disp: , Rfl:   •  meclizine (ANTIVERT) 25 MG tablet, Take 1 tablet by mouth 3 (Three) Times a Day As Needed for Dizziness., Disp: 20 tablet, Rfl: 2  •  metFORMIN (GLUCOPHAGE) 1000 MG tablet, TAKE 1 TABLET BY MOUTH TWO TIMES A DAY WITH MEALS, Disp: 180 tablet, Rfl: 1  •  nystatin-triamcinolone (MYCOLOG) 981990-0.1 " "UNIT/GM-% ointment, Apply to affected area 2 times daily, Disp: 30 g, Rfl: 1  •  Omega 3 1200 MG capsule, Take 1,000 mg by mouth 2 (Two) Times a Day., Disp: , Rfl:   •  ondansetron ODT (ZOFRAN-ODT) 4 MG disintegrating tablet, Place 1 tablet on the tongue Every 8 (Eight) Hours As Needed for Nausea or Vomiting., Disp: 12 tablet, Rfl: 0  •  pantoprazole (PROTONIX) 20 MG EC tablet, TAKE 1 TABLET BY MOUTH EVERY DAY, Disp: 30 tablet, Rfl: 0  •  Trulicity 1.5 MG/0.5ML solution pen-injector, INJECT 1.5MG UNDER THE SKIN INTO THE APPROPRIATE AREA AS DIRECTED ONE TIME PER WEEK, Disp: 6 mL, Rfl: 1    Review of Systems  Review of Systems   All other systems reviewed and are negative.       Physical Exam    /72   Pulse 79   Ht 193 cm (76\")   Wt (!) 169 kg (372 lb)   SpO2 95%   BMI 45.28 kg/m² Body mass index is 45.28 kg/m².  Physical Exam   Constitutional: He is oriented to person, place, and time. He appears well-developed. No distress.   HENT:   Head: Normocephalic.   Right Ear: External ear normal.   Left Ear: External ear normal.   Nose: Nose normal.   Eyes: Conjunctivae are normal. Right eye exhibits no discharge. Left eye exhibits no discharge. No scleral icterus.   Neck: No JVD present. No tracheal deviation present. No thyromegaly present.   Cardiovascular: Normal rate, regular rhythm and normal heart sounds.   No murmur heard.  Pulmonary/Chest: Effort normal and breath sounds normal. No respiratory distress. He has no wheezes.   Abdominal: Soft. Bowel sounds are normal. There is no abdominal tenderness.   Musculoskeletal: No tenderness.   Neurological: He is alert and oriented to person, place, and time.   Skin: Skin is warm and dry. No rash noted. He is not diaphoretic. No erythema.   Psychiatric: His behavior is normal. Judgment and thought content normal.       Labs and Imaging   Lab Results   Component Value Date    HGBA1C 7.5 03/08/2022    HGBA1C 10.1 09/15/2021    HGBA1C 6.4 05/24/2021     Office Visit " on 03/08/2022   Component Date Value Ref Range Status   • Glucose 03/08/2022 157 (A) 70 - 130 mg/dL Final   • Hemoglobin A1C 03/08/2022 7.5  % Final   • Lot Number 03/08/2022 [id number]   Final   • Expiration Date 03/08/2022 [id number]   Final     External labs from 7/30/2021 reviewed: Triglycerides 466, direct LDL 33, microalbumin/creatinine ratio 7, , AST 21, ALT 33, hemoglobin 16.2, hematocrit 49.3, TSH 0.89, free T4 1.1, free T3 3.2, vitamin B12 635    Assessment / Plan   Diagnoses and all orders for this visit:    1. Uncontrolled type 2 diabetes mellitus with hyperglycemia (HCC) (Primary)  -     POC Glucose, Blood  -     POC Glycosylated Hemoglobin (Hb A1C)    2. Mixed hyperlipidemia        Diabetes Mellitus 2 is under poor control  -A1c 7.5, down from 10.1 last visit  -Meter readings reviewed, he is testing 1-2 times per day, readings range from 123-371 (down from mostly 200-300s last visit), no hypoglycemia   -continue trulicity 1.5 mg weekly  -continue Invokana 300 mg daily  -continue glyburide 5 mg 2 pills daily AC  -cont metformin 1000mg BID  -continue lantus 70 u daily  -continue to work on diet  -eye exam due - encouraged to schedule  -Labs updated 7/2021  -Foot exam updated 5/2021      1.  Diet: 3-4 carb servings per meal for females, 4-5 carb servings per meal for males  Spread carb intake throughout the day  Increase lean protein and vegetable intake  Avoid sugary drinks and processed carbs including crackers, cookies, cakes  2.  Exercise: Recommend at least 30 minutes of exercise daily, at least 5 days per week. Increase exercise gradually.   3.  Blood Glucose Goal: Blood glucose goal <150 fasting, <180 2 hr postprandial  4.  Microalbumin due 5/2022  5.  Education performed during this visit: long term diabetic complications discussed. , annual eye examinations at Ophthalmology discussed, dental hygiene discussed  and foot care reviewed., home glucose monitoring emphasized, all medications,  side effects and compliance discussed carefully and Hypoglycemia management and prevention reviewed. Reviewed ‘ABCs’ of diabetes management (respective goals in parentheses):  A1C (<7), blood pressure (<130/80), and cholesterol (LDL <100, if CVD <70).    Mixed hyperlipidemia  -labs updated with pcp 7/2021 - pt also plans to see pcp soon for repeat labs  -continue lipitor 40mg qhs, continue vascepa    There are no Patient Instructions on file for this visit.    Follow up: Return in about 3 months (around 6/8/2022).    Discussed the nature of the disease including, risks, complications, implications, management, safe and proper use of medications. Encouraged therapeutic lifestyle changes including low calorie diet with plenty of fruits and vegetables, daily exercise, medication compliance, and keeping scheduled follow up appointments with me and any other providers. Encouraged patient to have appointment for complete physical, fasting labs, appropriate screenings, and immunizations on an annual basis.      Jo-Ann Clark PA-C

## 2022-04-08 DIAGNOSIS — E11.65 UNCONTROLLED TYPE 2 DIABETES MELLITUS WITH HYPERGLYCEMIA: Chronic | ICD-10-CM

## 2022-04-08 RX ORDER — CANAGLIFLOZIN 300 MG/1
TABLET, FILM COATED ORAL
Qty: 90 TABLET | Refills: 0 | Status: SHIPPED | OUTPATIENT
Start: 2022-04-08 | End: 2022-06-07

## 2022-04-12 ENCOUNTER — PRIOR AUTHORIZATION (OUTPATIENT)
Dept: ENDOCRINOLOGY | Facility: CLINIC | Age: 47
End: 2022-04-12

## 2022-04-12 NOTE — TELEPHONE ENCOUNTER
Approvedtoday  The request has been approved. The authorization is effective for a maximum of 12 fills from 04/12/2022 to 04/11/2023, as long as the member is enrolled in their current health plan. A written notification letter will follow with additional details.  Drug  Trulicity 1.5MG/0.5ML pen-injectors  Form  [employer] [place] [employer] ePA Form 2017 NCPDP

## 2022-05-02 DIAGNOSIS — E78.2 MIXED HYPERLIPIDEMIA: Chronic | ICD-10-CM

## 2022-05-03 RX ORDER — ICOSAPENT ETHYL 1000 MG/1
CAPSULE ORAL
Qty: 360 CAPSULE | Refills: 1 | Status: SHIPPED | OUTPATIENT
Start: 2022-05-03 | End: 2022-11-08

## 2022-05-18 ENCOUNTER — TELEPHONE (OUTPATIENT)
Dept: ENDOCRINOLOGY | Facility: CLINIC | Age: 47
End: 2022-05-18

## 2022-05-18 DIAGNOSIS — E11.65 UNCONTROLLED TYPE 2 DIABETES MELLITUS WITH HYPERGLYCEMIA: ICD-10-CM

## 2022-05-18 NOTE — TELEPHONE ENCOUNTER
PATIENT STATES THAT HE HAS LOST HIS ONE TOUCH ULTRA METER AND WOULD LIKE TO KNOW IF NIRALI WILL ORDER ANOTHER ONE.

## 2022-05-19 DIAGNOSIS — E11.65 UNCONTROLLED TYPE 2 DIABETES MELLITUS WITH HYPERGLYCEMIA: ICD-10-CM

## 2022-05-19 NOTE — TELEPHONE ENCOUNTER
PATIENT STATES THE TEST STRIPS HE HAS OPENED IS NOT WORKING. HE IS NOT SURE IF THE TEST STRIPS ARE BAD. HE NEEDS TO BE ADVISED ON WHAT TO DO ABOUT TEST STRIPS AND GET ANOTHER PRESCRIPTION. PHONE NUMBER [phone]. HE STATES THE LAST TEST HE USED BEFORE OPENING NEW BATCH OF TEST STRIPS WORKED WITH HIS MACHINE.

## 2022-05-19 NOTE — TELEPHONE ENCOUNTER
Returned pt call. He stated the strips are not working with his machine.  He confirmed that he is using a One Touch Ultra meter and is using Onetouch Ultra test strips.  They should both work together.l I advised that he return them to the pharmacy for a replacement, ands it sounds like he has a bad bath of test strips.   PT expressed understanding. Would call back if he has any questions.

## 2022-05-20 DIAGNOSIS — E11.65 UNCONTROLLED TYPE 2 DIABETES MELLITUS WITH HYPERGLYCEMIA: ICD-10-CM

## 2022-05-20 RX ORDER — BLOOD SUGAR DIAGNOSTIC
STRIP MISCELLANEOUS
Qty: 100 EACH | Refills: 3 | Status: SHIPPED | OUTPATIENT
Start: 2022-05-20

## 2022-06-07 DIAGNOSIS — E11.65 UNCONTROLLED TYPE 2 DIABETES MELLITUS WITH HYPERGLYCEMIA: Chronic | ICD-10-CM

## 2022-06-07 RX ORDER — CANAGLIFLOZIN 300 MG/1
TABLET, FILM COATED ORAL
Qty: 90 TABLET | Refills: 1 | Status: SHIPPED | OUTPATIENT
Start: 2022-06-07 | End: 2023-01-09

## 2022-07-09 DIAGNOSIS — E11.42 WELL CONTROLLED TYPE 2 DIABETES MELLITUS WITH PERIPHERAL NEUROPATHY: ICD-10-CM

## 2022-07-09 DIAGNOSIS — E78.2 MIXED HYPERLIPIDEMIA: ICD-10-CM

## 2022-07-09 DIAGNOSIS — E11.65 UNCONTROLLED TYPE 2 DIABETES MELLITUS WITH HYPERGLYCEMIA: Chronic | ICD-10-CM

## 2022-07-11 RX ORDER — ATORVASTATIN CALCIUM 40 MG/1
TABLET, FILM COATED ORAL
Qty: 90 TABLET | Refills: 0 | Status: SHIPPED | OUTPATIENT
Start: 2022-07-11 | End: 2022-10-07

## 2022-07-11 RX ORDER — GLYBURIDE 5 MG/1
TABLET ORAL
Qty: 180 TABLET | Refills: 0 | Status: SHIPPED | OUTPATIENT
Start: 2022-07-11 | End: 2022-10-07

## 2022-07-13 ENCOUNTER — OFFICE VISIT (OUTPATIENT)
Dept: ENDOCRINOLOGY | Facility: CLINIC | Age: 47
End: 2022-07-13

## 2022-07-13 VITALS
BODY MASS INDEX: 38.36 KG/M2 | HEART RATE: 72 BPM | DIASTOLIC BLOOD PRESSURE: 72 MMHG | WEIGHT: 315 LBS | OXYGEN SATURATION: 96 % | HEIGHT: 76 IN | SYSTOLIC BLOOD PRESSURE: 116 MMHG

## 2022-07-13 DIAGNOSIS — E11.65 UNCONTROLLED TYPE 2 DIABETES MELLITUS WITH HYPERGLYCEMIA: Primary | Chronic | ICD-10-CM

## 2022-07-13 LAB
EXPIRATION DATE: ABNORMAL
EXPIRATION DATE: NORMAL
GLUCOSE BLDC GLUCOMTR-MCNC: 176 MG/DL (ref 70–130)
HBA1C MFR BLD: 7.5 %
Lab: ABNORMAL
Lab: NORMAL

## 2022-07-13 PROCEDURE — 99214 OFFICE O/P EST MOD 30 MIN: CPT | Performed by: PHYSICIAN ASSISTANT

## 2022-07-13 PROCEDURE — 83036 HEMOGLOBIN GLYCOSYLATED A1C: CPT | Performed by: PHYSICIAN ASSISTANT

## 2022-07-13 PROCEDURE — 3051F HG A1C>EQUAL 7.0%<8.0%: CPT | Performed by: PHYSICIAN ASSISTANT

## 2022-07-13 PROCEDURE — 82947 ASSAY GLUCOSE BLOOD QUANT: CPT | Performed by: PHYSICIAN ASSISTANT

## 2022-07-13 RX ORDER — ERGOCALCIFEROL 1.25 MG/1
50000 CAPSULE ORAL WEEKLY
COMMUNITY
Start: 2022-06-15

## 2022-07-13 NOTE — PROGRESS NOTES
Chief Complaint  F/u for Diabetes Mellitus.    HPI   Raul Welch is a 46 y.o. male who is here today for f/u of Diabetes Mellitus type 2. The initial diagnosis of diabetes was made in his 20s.     Sometimes has trouble remembering to take his medication, has been missing meds up to 3 times per week.     Diabetic complications: peripheral neuropathy  Eye exam current (within one year): updated 11/2021 - pt reports no retinopathy     Current diabetic medications include:  Metformin 1000mg BID  Invokana 300 mg daily  Glybride 5 mg 2 pill daily AC  Trulicity 1.5 mg weekly  Lantus 70u qhs    Statin: lipitor 40, changed from zocor 20 6/2020, vascepa started 6/2020    Past medications: ozempic 0.5 mg (changed to trulicity due to insurance preference), basaglar, Steglatro, glyburide    Diabetic Monitoring  -   Meter readings reviewed- 3 readings from the past week (178-203), no hypoglycemia      Freestyle Jose was expensive.     The following portions of the patient's history were reviewed and updated by me as appropriate: allergies, current medications, past family history, past social history, past surgical history and problem list.      Past Medical History:   Diagnosis Date   • Arthritis    • Asthma     SPOUSE REPORTS ISSUES AS A CHILD   • Back pain    • Bell's palsy    • Blood in stool    • Depression    • Diabetes (HCC)    • Diarrhea     WITH ABDOMINAL CRAMPING   • Diverticulitis    • Elevated cholesterol    • GERD (gastroesophageal reflux disease)    • Headache    • High cholesterol    • History of being tatooed    • History of bronchitis    • History of fracture     HX OF FOOT (SPOUSE UNSURE LATERALITY)   • History of gout    • Hypertension    • Myocardial infarction (HCC) 1997   • Sleep apnea     USES BIPAP HS - TO BRING IN THE DOS   • Tattoos    • Wears glasses        Medications    Current Outpatient Medications:   •  atorvastatin (LIPITOR) 40 MG tablet, TAKE 1 TABLET BY MOUTH EVERY DAY, Disp: 90 tablet,  "Rfl: 0  •  B-D UF III MINI PEN NEEDLES 31G X 5 MM misc, USE ONCE DAILY WITH INSULIN INJECTIONS AS DIRECTED, Disp: , Rfl: 11  •  Blood Glucose Monitoring Suppl device, Use as directed to check blood sugar, Disp: 1 each, Rfl: 0  •  carvedilol (COREG) 25 MG tablet, Take 25 mg by mouth 2 (Two) Times a Day. Take with food, Disp: , Rfl:   •  cetirizine (ZyrTEC) 10 MG tablet, Take 12 mg by mouth Daily., Disp: , Rfl:   •  citalopram (CeleXA) 20 MG tablet, Take 20 mg by mouth Daily., Disp: , Rfl:   •  glucose blood (OneTouch Ultra) test strip, Test Three times daily, Disp: 100 each, Rfl: 3  •  glyburide (DIAbeta) 5 MG tablet, TAKE 2 TABLETS BY MOUTH EVERY DAY WITH BREAKFAST, Disp: 180 tablet, Rfl: 0  •  hydrochlorothiazide (HYDRODIURIL) 25 MG tablet, Take 25 mg by mouth Daily., Disp: , Rfl:   •  icosapent ethyl (VASCEPA) 1 g capsule capsule, TAKE 2 CAPSULES BY MOUTH TWO TIMES A DAY WITH MEALS, Disp: 360 capsule, Rfl: 1  •  Insulin Pen Needle (PEN NEEDLES 1/2\") 29G X 12MM misc, Use as directed to inject insulin, Disp: 100 each, Rfl: 11  •  Insulin Pen Needle 32G X 6 MM misc, Use daily with insulin, Disp: 100 each, Rfl: 11  •  Invokana 300 MG tablet tablet, TAKE 1 TABLET BY MOUTH EVERY DAY, Disp: 90 tablet, Rfl: 1  •  Lantus SoloStar 100 UNIT/ML injection pen, inject 80 units under the skin into the appropriate area as directed every night, Disp: 72 mL, Rfl: 0  •  lisinopril (PRINIVIL,ZESTRIL) 40 MG tablet, Take 40 mg by mouth Daily., Disp: , Rfl:   •  meclizine (ANTIVERT) 25 MG tablet, Take 1 tablet by mouth 3 (Three) Times a Day As Needed for Dizziness., Disp: 20 tablet, Rfl: 2  •  metFORMIN (GLUCOPHAGE) 1000 MG tablet, TAKE 1 TABLET BY MOUTH TWO TIMES A DAY WITH MEALS, Disp: 180 tablet, Rfl: 0  •  nystatin-triamcinolone (MYCOLOG) 768421-9.1 UNIT/GM-% ointment, Apply to affected area 2 times daily, Disp: 30 g, Rfl: 1  •  Omega 3 1200 MG capsule, Take 1,000 mg by mouth 2 (Two) Times a Day., Disp: , Rfl:   •  ondansetron ODT " "(ZOFRAN-ODT) 4 MG disintegrating tablet, Place 1 tablet on the tongue Every 8 (Eight) Hours As Needed for Nausea or Vomiting., Disp: 12 tablet, Rfl: 0  •  pantoprazole (PROTONIX) 20 MG EC tablet, TAKE 1 TABLET BY MOUTH EVERY DAY, Disp: 30 tablet, Rfl: 0  •  Trulicity 1.5 MG/0.5ML solution pen-injector, INJECT 1.5MG UNDER THE SKIN INTO THE APPROPRIATE AREA AS DIRECTED ONE TIME PER WEEK, Disp: 6 mL, Rfl: 1  •  vitamin D (ERGOCALCIFEROL) 1.25 MG (27015 UT) capsule capsule, Take 50,000 Units by mouth 1 (One) Time Per Week., Disp: , Rfl:     Review of Systems  Review of Systems   All other systems reviewed and are negative.       Physical Exam    /72   Pulse 72   Ht 193 cm (76\")   Wt (!) 170 kg (374 lb)   SpO2 96%   BMI 45.52 kg/m² Body mass index is 45.52 kg/m².  Physical Exam   Constitutional: He is oriented to person, place, and time. He appears well-developed. No distress.   HENT:   Head: Normocephalic.   Right Ear: External ear normal.   Left Ear: External ear normal.   Nose: Nose normal.   Eyes: Conjunctivae are normal. Right eye exhibits no discharge. Left eye exhibits no discharge. No scleral icterus.   Neck: No JVD present. No tracheal deviation present. No thyromegaly present.   Cardiovascular: Normal rate, regular rhythm and normal heart sounds.   No murmur heard.  Pulmonary/Chest: Effort normal and breath sounds normal. No respiratory distress. He has no wheezes.   Musculoskeletal: No tenderness.    Raul had a diabetic foot exam performed today.   During the foot exam he had a monofilament test performed.    Neurological Sensory Findings - Altered hot/cold right ankle/foot discrimination and altered hot/cold left ankle/foot discrimination. Altered sharp/dull right ankle/foot discrimination and altered sharp/dull left ankle/foot discrimination. Right ankle/foot altered proprioception and left ankle/foot altered proprioception.  Vascular Status -  His right foot exhibits normal foot vasculature  " and no edema. His left foot exhibits normal foot vasculature  and no edema.  Skin Integrity  -  His right foot skin is intact. He has right foot onychomycosis and callous right foot.  He has no right foot ulcer.His left foot skin is intact.He has left foot onychomycosis and callous left foot. He has no left foot ulcer..  Neurological: He is alert and oriented to person, place, and time.   Skin: Skin is warm and dry. No rash noted. He is not diaphoretic. No erythema.   Psychiatric: His behavior is normal. Judgment and thought content normal.       Labs and Imaging   Lab Results   Component Value Date    HGBA1C 7.5 07/13/2022    HGBA1C 7.5 03/08/2022    HGBA1C 10.1 09/15/2021     Office Visit on 07/13/2022   Component Date Value Ref Range Status   • Hemoglobin A1C 07/13/2022 7.5  % Final   • Lot Number 07/13/2022 [id number]   Final   • Expiration Date 07/13/2022 03/01/2024   Final   • Glucose 07/13/2022 176 (A) 70 - 130 mg/dL Final   • Lot Number 07/13/2022 [id number]   Final   • Expiration Date 07/13/2022 1/28/2023   Final     External labs from 3/11/2022 reviewed: A1c 7.6, urine albumin less than 0.2, triglycerides 252, LDL 74, TSH 0.53, vitamin B12 475    Assessment / Plan   Diagnoses and all orders for this visit:    1. Uncontrolled type 2 diabetes mellitus with hyperglycemia (HCC) (Primary)  -     POC Glycosylated Hemoglobin (Hb A1C)  -     POC Glucose, Blood        Diabetes Mellitus 2 is under inadequate control  -A1c 7.5  -has missed some doses of medication but is working on trying to take meds regularly  -continue trulicity 1.5 mg weekly  -continue Invokana 300 mg daily  -continue glyburide 5 mg 2 pills daily AC  -cont metformin 1000mg BID  -continue lantus 70 u daily  -continue to work on diet  -eye exam updated 11/2021  -Labs updated 3/2022  -Foot exam updated today    Mixed hyperlipidemia  -lipids updated 3/2022  -continue lipitor 40mg qhs, continue vascepa    There are no Patient Instructions on file for  this visit.    Follow up: Return in about 3 months (around 10/13/2022).      Jo-Ann Clark PA-C

## 2022-08-01 ENCOUNTER — APPOINTMENT (OUTPATIENT)
Dept: GENERAL RADIOLOGY | Facility: HOSPITAL | Age: 47
End: 2022-08-01
Payer: COMMERCIAL

## 2022-08-01 ENCOUNTER — HOSPITAL ENCOUNTER (EMERGENCY)
Facility: HOSPITAL | Age: 47
Discharge: HOME | End: 2022-08-01
Attending: EMERGENCY MEDICINE | Admitting: EMERGENCY MEDICINE
Payer: COMMERCIAL

## 2022-08-01 VITALS
SYSTOLIC BLOOD PRESSURE: 131 MMHG | BODY MASS INDEX: 38.36 KG/M2 | WEIGHT: 315 LBS | TEMPERATURE: 98.1 F | RESPIRATION RATE: 22 BRPM | HEIGHT: 76 IN | DIASTOLIC BLOOD PRESSURE: 80 MMHG | HEART RATE: 87 BPM | OXYGEN SATURATION: 99 %

## 2022-08-01 DIAGNOSIS — S92.912A: Primary | ICD-10-CM

## 2022-08-01 PROCEDURE — 73660 X-RAY EXAM OF TOE(S): CPT

## 2022-08-01 PROCEDURE — 0 LIDOCAINE 1 % SOLUTION: Performed by: EMERGENCY MEDICINE

## 2022-08-01 PROCEDURE — 99282 EMERGENCY DEPT VISIT SF MDM: CPT

## 2022-08-01 RX ORDER — LIDOCAINE HYDROCHLORIDE 10 MG/ML
10 INJECTION, SOLUTION INFILTRATION; PERINEURAL ONCE
Status: COMPLETED | OUTPATIENT
Start: 2022-08-01 | End: 2022-08-01

## 2022-08-01 RX ORDER — HYDROCODONE BITARTRATE AND ACETAMINOPHEN 5; 325 MG/1; MG/1
1 TABLET ORAL EVERY 6 HOURS PRN
Qty: 12 TABLET | Refills: 0 | Status: SHIPPED | OUTPATIENT
Start: 2022-08-01 | End: 2022-11-01

## 2022-08-01 RX ADMIN — LIDOCAINE HYDROCHLORIDE 10 ML: 10 INJECTION, SOLUTION INFILTRATION; PERINEURAL at 12:54

## 2022-08-01 NOTE — ED PROVIDER NOTES
Subjective   History of Present Illness    Chief Complaint: Toe pain and swelling  History of Present Illness: 46-year-old male presents with above complaint, got it caught in a dog kennel last night.  History of diabetes, states it feels numb, noticed a blood blister over none  Onset: Last night  Duration: Single episode persistent symptoms  Exacerbating / Alleviating factors: Worse with ambulation  Associated symptoms: None      Nurses Notes reviewed and agree, including vitals, allergies, social history and prior medical history.     REVIEW OF SYSTEMS: All systems reviewed and not pertinent unless noted.    Positive for: Left third toe pain and swelling, contusion    Negative for: Punctures or lacerations  Review of Systems    Past Medical History:   Diagnosis Date   • Arthritis    • Asthma     SPOUSE REPORTS ISSUES AS A CHILD   • Back pain    • Bell's palsy    • Blood in stool    • Depression    • Diabetes (HCC)    • Diarrhea     WITH ABDOMINAL CRAMPING   • Diverticulitis    • Elevated cholesterol    • GERD (gastroesophageal reflux disease)    • Headache    • High cholesterol    • History of being tatooed    • History of bronchitis    • History of fracture     HX OF FOOT (SPOUSE UNSURE LATERALITY)   • History of gout    • Hypertension    • Myocardial infarction (HCC) 1997   • Sleep apnea     USES BIPAP HS - TO BRING IN THE DOS   • Tattoos    • Wears glasses        No Known Allergies    Past Surgical History:   Procedure Laterality Date   • APPENDECTOMY  1990   • CARDIAC CATHETERIZATION     • CARDIAC CATHETERIZATION N/A 9/30/2017    Procedure: Coronary angiography;  Surgeon: Zander Bobo MD;  Location: [facility] CATH INVASIVE LOCATION;  Service:    • COLONOSCOPY N/A 6/25/2018    Procedure: COLONOSCOPY with hot snare polypectomy,  cold snare polypectomy, cold biopsy polypectomies, and biopsies;  Surgeon: Sandro Potter MD;  Location: [facility] ENDOSCOPY;  Service: Gastroenterology   • KNEE SURGERY Left     removed  "infection/mass in knee   • OTHER SURGICAL HISTORY      SPOUSE REPORTS GROWTH REMOVED FROM THROAT WHEN PATIENT WAS A YOUNG CHILD       Family History   Problem Relation Age of Onset   • Diabetes Mother    • Alcohol abuse Father    • Diabetes Father    • Heart disease Father    • Colon cancer Neg Hx    • Cirrhosis Neg Hx    • Liver cancer Neg Hx    • Liver disease Neg Hx        Social History     Socioeconomic History   • Marital status:    Tobacco Use   • Smoking status: Former Smoker     Packs/day: 0.25     Years: 0.50     Pack years: 0.12     Types: Cigarettes     Quit date:      Years since quittin.6   • Smokeless tobacco: Never Used   • Tobacco comment: SPOUSE REPORTS PATIENT SMOKED VERY BRIEFLY WITH NO ADDICTIVE HABIT AS A TEENAGER. QUIT OVER 25 YEARS AGO.   Vaping Use   • Vaping Use: Never used   Substance and Sexual Activity   • Alcohol use: Never   • Drug use: Never   • Sexual activity: Defer           Objective   Physical Exam  /86 (BP Location: Left arm, Patient Position: Sitting)   Pulse 90   Temp 98 °F (36.7 °C) (Oral)   Resp 22   Ht 193 cm (76\")   Wt (!) 165 kg (363 lb)   SpO2 98%   BMI 44.19 kg/m²     CONSTITUTIONAL: Well developed, obese nontoxic 46-year-old male,  in no acute distress.  VITAL SIGNS: per nursing, reviewed and noted  SKIN: exposed skin with diffuse contusion of the left third toe as well as surrounding blister  EYES: Grossly EOMI, no icterus  ENT: Normal voice.  Patient maintained wearing a mask throughout patient encounter due to coronavirus pandemic  RESPIRATORY:  No increased work of breathing. No retractions.   CARDIOVASCULAR:  regular rate and rhythm, no murmurs.  Good Peripheral pulses. Good cap refill to extremities.   MUSCULOSKELETAL: No obvious deformity.  Decreased range of motion at the left third toe.  Otherwise strength and tone grossly normal.  no spasms. no neck or back tenderness or spasm.   NEUROLOGIC: Alert, oriented x 3. No gross deficits. " GCS 15.   PSYCH: appropriate affect.  : no bladder tenderness or distention, no CVA tenderness      Procedures     Dislocation reduction the third PIP left foot  Consent obtained risks and benefits discussed patient like to continue  Lidocaine 1% digital block,  Traction and manipulation with persistent subluxation.  Tolerated well without any other complications      Patient was splinted at the recommendation of orthopedics with a dorsal splint covered a tongue depressor and then buddy tape with 2 inch Kerlix to the second toe.  Tolerated well without complication.    ED Course      XR Toe 2+ View Left    Result Date: 8/1/2022  PROCEDURE: XR TOE 2+ VW LEFT-  THREE VIEW  HISTORY: post reduction attempt. , dislocation.  COMPARISON: Exam done 2.5 hours earlier.  FINDINGS:  Three views show persistent dorsal dislocation of the third PIP joint.  Fracture is again noted at the base of the third middle phalanx best appreciated on oblique views. Other digits are stable.      Impression: No significant change dorsal dislocation third PIP joint    This report was signed and finalized on 8/1/2022 2:50 PM by Clarke Alanis MD.    XR Toe 2+ View Left    Result Date: 8/1/2022  PROCEDURE: XR TOE 2+ VW LEFT-  THREE VIEW  HISTORY: 3rd toe blunt injury., Pain  FINDINGS:  Three views show fracture of the base of the third proximal phalanx.  There is mild displacement.  There is associated dislocation of the third PIP joint.  No other fracture or dislocation is seen.       Impression: Fracture dislocation of the third middle phalanx involving the PIP joint    This report was signed and finalized on 8/1/2022 12:16 PM by Clarke Alanis MD.         Discussed with Dr. Luque, orthopedist on-call, requested dorsal splint and buddy tape, requested referral to podiatry outpatient for management.     Discussed with the office of Dr. Ramos, foot and ankle surgeon, can see the patient in [place] office at 1045 on Wednesday.                    ROBERT reviewed by Hieu De La Cruz,        MDM    Final diagnoses:   Closed fracture dislocation of interphalangeal joint of single toe of left foot, initial encounter       ED Disposition  ED Disposition     ED Disposition   Discharge    Condition   Stable    Comment   --             Morales Ramos, DPM  [address] [facility] [address] [phone]      This Wednesday at 10:45 am         Medication List      New Prescriptions    HYDROcodone-acetaminophen 5-325 MG per tablet  Commonly known as: NORCO  Take 1 tablet by mouth Every 6 (Six) Hours As Needed for Severe Pain .           Where to Get Your Medications      These medications were sent to [id number] - [place], [place] - [address] - [phone]  - [phone] FX  [address] [facility] [address]    Phone: [phone]   · HYDROcodone-acetaminophen 5-325 MG per tablet          Hieu De La Cruz DO  08/01/22 1534

## 2022-10-07 DIAGNOSIS — E11.42 WELL CONTROLLED TYPE 2 DIABETES MELLITUS WITH PERIPHERAL NEUROPATHY: ICD-10-CM

## 2022-10-07 DIAGNOSIS — E11.65 UNCONTROLLED TYPE 2 DIABETES MELLITUS WITH HYPERGLYCEMIA: Chronic | ICD-10-CM

## 2022-10-07 DIAGNOSIS — E78.2 MIXED HYPERLIPIDEMIA: ICD-10-CM

## 2022-10-07 RX ORDER — ATORVASTATIN CALCIUM 40 MG/1
TABLET, FILM COATED ORAL
Qty: 90 TABLET | Refills: 0 | Status: SHIPPED | OUTPATIENT
Start: 2022-10-07 | End: 2023-01-09

## 2022-10-07 RX ORDER — GLYBURIDE 5 MG/1
TABLET ORAL
Qty: 180 TABLET | Refills: 0 | Status: SHIPPED | OUTPATIENT
Start: 2022-10-07 | End: 2023-01-09

## 2022-10-26 DIAGNOSIS — Z79.4 CONTROLLED TYPE 2 DIABETES MELLITUS WITH HYPOGLYCEMIA, WITH LONG-TERM CURRENT USE OF INSULIN: ICD-10-CM

## 2022-10-26 DIAGNOSIS — E11.649 CONTROLLED TYPE 2 DIABETES MELLITUS WITH HYPOGLYCEMIA, WITH LONG-TERM CURRENT USE OF INSULIN: ICD-10-CM

## 2022-10-26 RX ORDER — DULAGLUTIDE 1.5 MG/.5ML
INJECTION, SOLUTION SUBCUTANEOUS
Qty: 6 ML | Refills: 1 | Status: SHIPPED | OUTPATIENT
Start: 2022-10-26 | End: 2022-11-01 | Stop reason: DRUGHIGH

## 2022-11-01 ENCOUNTER — OFFICE VISIT (OUTPATIENT)
Dept: ENDOCRINOLOGY | Facility: CLINIC | Age: 47
End: 2022-11-01

## 2022-11-01 ENCOUNTER — APPOINTMENT (OUTPATIENT)
Dept: LAB | Facility: HOSPITAL | Age: 47
End: 2022-11-01

## 2022-11-01 VITALS
OXYGEN SATURATION: 97 % | HEART RATE: 87 BPM | SYSTOLIC BLOOD PRESSURE: 128 MMHG | WEIGHT: 315 LBS | BODY MASS INDEX: 38.36 KG/M2 | HEIGHT: 76 IN | DIASTOLIC BLOOD PRESSURE: 78 MMHG

## 2022-11-01 DIAGNOSIS — E11.649 CONTROLLED TYPE 2 DIABETES MELLITUS WITH HYPOGLYCEMIA, WITH LONG-TERM CURRENT USE OF INSULIN: Primary | Chronic | ICD-10-CM

## 2022-11-01 DIAGNOSIS — Z79.4 CONTROLLED TYPE 2 DIABETES MELLITUS WITH HYPOGLYCEMIA, WITH LONG-TERM CURRENT USE OF INSULIN: Primary | Chronic | ICD-10-CM

## 2022-11-01 DIAGNOSIS — E78.2 MIXED HYPERLIPIDEMIA: Chronic | ICD-10-CM

## 2022-11-01 DIAGNOSIS — E55.9 VITAMIN D DEFICIENCY: Chronic | ICD-10-CM

## 2022-11-01 LAB
EXPIRATION DATE: NORMAL
EXPIRATION DATE: NORMAL
GLUCOSE BLDC GLUCOMTR-MCNC: 93 MG/DL (ref 70–130)
HBA1C MFR BLD: 7.9 %
Lab: NORMAL
Lab: NORMAL

## 2022-11-01 PROCEDURE — 82947 ASSAY GLUCOSE BLOOD QUANT: CPT | Performed by: PHYSICIAN ASSISTANT

## 2022-11-01 PROCEDURE — 3051F HG A1C>EQUAL 7.0%<8.0%: CPT | Performed by: PHYSICIAN ASSISTANT

## 2022-11-01 PROCEDURE — 80061 LIPID PANEL: CPT | Performed by: PHYSICIAN ASSISTANT

## 2022-11-01 PROCEDURE — 83036 HEMOGLOBIN GLYCOSYLATED A1C: CPT | Performed by: PHYSICIAN ASSISTANT

## 2022-11-01 PROCEDURE — 80053 COMPREHEN METABOLIC PANEL: CPT | Performed by: PHYSICIAN ASSISTANT

## 2022-11-01 PROCEDURE — 82306 VITAMIN D 25 HYDROXY: CPT | Performed by: PHYSICIAN ASSISTANT

## 2022-11-01 PROCEDURE — 84443 ASSAY THYROID STIM HORMONE: CPT | Performed by: PHYSICIAN ASSISTANT

## 2022-11-01 PROCEDURE — 99214 OFFICE O/P EST MOD 30 MIN: CPT | Performed by: PHYSICIAN ASSISTANT

## 2022-11-01 RX ORDER — DULAGLUTIDE 3 MG/.5ML
3 INJECTION, SOLUTION SUBCUTANEOUS WEEKLY
Qty: 6 ML | Refills: 1 | Status: SHIPPED | OUTPATIENT
Start: 2022-11-01 | End: 2023-03-15 | Stop reason: SDUPTHER

## 2022-11-01 RX ORDER — PROCHLORPERAZINE 25 MG/1
1 SUPPOSITORY RECTAL
Qty: 1 EACH | Refills: 3 | Status: SHIPPED | OUTPATIENT
Start: 2022-11-01 | End: 2022-12-12 | Stop reason: SDUPTHER

## 2022-11-01 RX ORDER — PROCHLORPERAZINE 25 MG/1
SUPPOSITORY RECTAL
Qty: 3 EACH | Refills: 11 | Status: SHIPPED | OUTPATIENT
Start: 2022-11-01

## 2022-11-01 RX ORDER — PROCHLORPERAZINE 25 MG/1
1 SUPPOSITORY RECTAL CONTINUOUS
Qty: 1 EACH | Refills: 0 | Status: SHIPPED | OUTPATIENT
Start: 2022-11-01

## 2022-11-01 NOTE — PROGRESS NOTES
Chief Complaint  F/u for Diabetes Mellitus.    HPI   Raul Welch is a 47 y.o. male who is here today for f/u of Diabetes Mellitus type 2. The initial diagnosis of diabetes was made in his 20s.     Has been taking meds more regularly.   More food excursions with Halloween candy.   On weekly vit d supplement.    Diabetic complications: peripheral neuropathy  Eye exam current (within one year): [facility]    Current diabetic medications include:  Metformin 1000mg BID  Invokana 300 mg daily  Glybride 5 mg 2 pill daily AC  Trulicity 1.5 mg weekly  Lantus 70u qhs    Statin: lipitor 40, changed from zocor 20 6/2020, vascepa started 6/2020    Past medications: ozempic 0.5 mg (changed to trulicity due to insurance preference), basaglar, Steglatro, glyburide    Diabetic Monitoring  -   Meter readings reviewed- testing 1x/day, fasting bg 126-304, during the day 149-315, no hypoglycemia    Freestyle Jose was expensive.     The following portions of the patient's history were reviewed and updated by me as appropriate: allergies, current medications, past family history, past social history, past surgical history and problem list.    Past Medical History:   Diagnosis Date   • Arthritis    • Asthma     SPOUSE REPORTS ISSUES AS A CHILD   • Back pain    • Bell's palsy    • Blood in stool    • Depression    • Diabetes (HCC)    • Diarrhea     WITH ABDOMINAL CRAMPING   • Diverticulitis    • Elevated cholesterol    • GERD (gastroesophageal reflux disease)    • Headache    • High cholesterol    • History of being tatooed    • History of bronchitis    • History of fracture     HX OF FOOT (SPOUSE UNSURE LATERALITY)   • History of gout    • Hypertension    • Myocardial infarction (HCC) 1997   • Sleep apnea     USES BIPAP HS - TO BRING IN THE DOS   • Tattoos    • Type 2 diabetes mellitus (HCC)    • Wears glasses        Medications    Current Outpatient Medications:   •  atorvastatin (LIPITOR) 40 MG tablet, TAKE 1  TABLET BY MOUTH EVERY DAY, Disp: 90 tablet, Rfl: 0  •  B-D UF III MINI PEN NEEDLES 31G X 5 MM misc, USE ONCE DAILY WITH INSULIN INJECTIONS AS DIRECTED, Disp: , Rfl: 11  •  Blood Glucose Monitoring Suppl device, Use as directed to check blood sugar, Disp: 1 each, Rfl: 0  •  cetirizine (ZyrTEC) 10 MG tablet, Take 12 mg by mouth Daily., Disp: , Rfl:   •  citalopram (CeleXA) 20 MG tablet, Take 20 mg by mouth Daily., Disp: , Rfl:   •  glucose blood (OneTouch Ultra) test strip, Test Three times daily, Disp: 100 each, Rfl: 3  •  glyburide (DIAbeta) 5 MG tablet, TAKE 2 TABLETS BY MOUTH EVERY DAY WITH breakfast, Disp: 180 tablet, Rfl: 0  •  hydrochlorothiazide (HYDRODIURIL) 25 MG tablet, Take 25 mg by mouth Daily., Disp: , Rfl:   •  icosapent ethyl (VASCEPA) 1 g capsule capsule, TAKE 2 CAPSULES BY MOUTH TWO TIMES A DAY WITH MEALS, Disp: 360 capsule, Rfl: 1  •  Insulin Pen Needle 32G X 6 MM misc, Use daily with insulin, Disp: 100 each, Rfl: 11  •  Invokana 300 MG tablet tablet, TAKE 1 TABLET BY MOUTH EVERY DAY, Disp: 90 tablet, Rfl: 1  •  Lantus SoloStar 100 UNIT/ML injection pen, inject 80 units under the skin into the appropriate area as directed every night, Disp: 72 mL, Rfl: 0  •  lisinopril (PRINIVIL,ZESTRIL) 40 MG tablet, Take 40 mg by mouth Daily., Disp: , Rfl:   •  meclizine (ANTIVERT) 25 MG tablet, Take 1 tablet by mouth 3 (Three) Times a Day As Needed for Dizziness., Disp: 20 tablet, Rfl: 2  •  metFORMIN (GLUCOPHAGE) 1000 MG tablet, TAKE 1 TABLET BY MOUTH TWO TIMES A DAY WITH MEALS, Disp: 180 tablet, Rfl: 0  •  nystatin-triamcinolone (MYCOLOG) 940703-2.1 UNIT/GM-% ointment, Apply to affected area 2 times daily, Disp: 30 g, Rfl: 1  •  Omega 3 1200 MG capsule, Take 1,000 mg by mouth 2 (Two) Times a Day., Disp: , Rfl:   •  ondansetron ODT (ZOFRAN-ODT) 4 MG disintegrating tablet, Place 1 tablet on the tongue Every 8 (Eight) Hours As Needed for Nausea or Vomiting., Disp: 12 tablet, Rfl: 0  •  pantoprazole (PROTONIX) 20  "MG EC tablet, TAKE 1 TABLET BY MOUTH EVERY DAY, Disp: 30 tablet, Rfl: 0  •  vitamin D (ERGOCALCIFEROL) 1.25 MG (36879 UT) capsule capsule, Take 50,000 Units by mouth 1 (One) Time Per Week., Disp: , Rfl:   •  Continuous Blood Gluc  (Dexcom G6 ) device, 1 each Continuous., Disp: 1 each, Rfl: 0  •  Continuous Blood Gluc Sensor (Dexcom G6 Sensor), Every 10 (Ten) Days., Disp: 3 each, Rfl: 11  •  Continuous Blood Gluc Transmit (Dexcom G6 Transmitter) misc, 1 each Every 3 (Three) Months., Disp: 1 each, Rfl: 3  •  Dulaglutide (Trulicity) 3 MG/0.5ML solution pen-injector, Inject 0.5 mL under the skin into the appropriate area as directed 1 (One) Time Per Week. Dose increase, Disp: 6 mL, Rfl: 1    Review of Systems  Review of Systems   All other systems reviewed and are negative.       Physical Exam    /78   Pulse 87   Ht 193 cm (76\")   Wt (!) 166 kg (365 lb)   SpO2 97%   BMI 44.43 kg/m² Body mass index is 44.43 kg/m².  Physical Exam   Constitutional: He is oriented to person, place, and time. He appears well-developed. No distress.   HENT:   Head: Normocephalic.   Right Ear: External ear normal.   Left Ear: External ear normal.   Nose: Nose normal.   Eyes: Conjunctivae are normal. Right eye exhibits no discharge. Left eye exhibits no discharge. No scleral icterus.   Neck: No JVD present. No tracheal deviation present. No thyromegaly present.   Cardiovascular: Normal rate, regular rhythm, normal heart sounds and intact distal pulses.   No murmur heard.  Pulmonary/Chest: Effort normal and breath sounds normal. No respiratory distress. He has no wheezes.   Musculoskeletal: No tenderness or edema.   Neurological: He is alert and oriented to person, place, and time.   Skin: Skin is warm and dry. No rash noted. He is not diaphoretic. No erythema.   Psychiatric: He has a normal mood and affect. His behavior is normal. Judgment and thought content normal.       Labs and Imaging   Lab Results   Component " Value Date    HGBA1C 7.9 11/01/2022    HGBA1C 7.5 07/13/2022    HGBA1C 7.5 03/08/2022     Office Visit on 11/01/2022   Component Date Value Ref Range Status   • Hemoglobin A1C 11/01/2022 7.9  % Final   • Lot Number 11/01/2022 [id number]   Final   • Expiration Date 11/01/2022 07-   Final   • Glucose 11/01/2022 93  70 - 130 mg/dL Final   • Lot Number 11/01/2022 [id number]   Final   • Expiration Date 11/01/2022 05-   Final     External labs from 3/11/2022 reviewed: A1c 7.6, urine albumin less than 0.2, triglycerides 252, LDL 74, TSH 0.53, vitamin B12 475    Assessment / Plan   Diagnoses and all orders for this visit:    1. Controlled type 2 diabetes mellitus with hypoglycemia, with long-term current use of insulin (HCC) (Primary)  -     POC Glycosylated Hemoglobin (Hb A1C)  -     POC Glucose, Blood  -     Continuous Blood Gluc Transmit (Dexcom G6 Transmitter) misc; 1 each Every 3 (Three) Months.  Dispense: 1 each; Refill: 3  -     Continuous Blood Gluc  (Dexcom G6 ) device; 1 each Continuous.  Dispense: 1 each; Refill: 0  -     Continuous Blood Gluc Sensor (Dexcom G6 Sensor); Every 10 (Ten) Days.  Dispense: 3 each; Refill: 11  -     Dulaglutide (Trulicity) 3 MG/0.5ML solution pen-injector; Inject 0.5 mL under the skin into the appropriate area as directed 1 (One) Time Per Week. Dose increase  Dispense: 6 mL; Refill: 1  -     Comprehensive Metabolic Panel  -     Lipid Panel  -     TSH    2. Mixed hyperlipidemia  -     Comprehensive Metabolic Panel  -     Lipid Panel    3. Vitamin D deficiency  -     Vitamin D,25-Hydroxy        Diabetes Mellitus 2 is under inadequate control  -A1c 7.9, up from 7.5 last visit  -increase trulicity to 3 mg weekly  -continue Invokana 300 mg daily  -continue glyburide 5 mg 2 pills daily AC  -cont metformin 1000mg BID  -continue lantus 70 u daily  -continue to work on diet  -rx dexcom   -eye exam utd  -Labs updated today, urine albumin due 3/2023  -Foot exam  updated 7/2022    Mixed hyperlipidemia  -lipids today  -continue lipitor 40mg qhs, continue vascepa    There are no Patient Instructions on file for this visit.    Follow up: Return in about 3 months (around 2/1/2023).      J-oAnn Clark PA-C

## 2022-11-02 ENCOUNTER — PRIOR AUTHORIZATION (OUTPATIENT)
Dept: ENDOCRINOLOGY | Facility: CLINIC | Age: 47
End: 2022-11-02

## 2022-11-02 LAB
25(OH)D3 SERPL-MCNC: 19.5 NG/ML (ref 30–100)
ALBUMIN SERPL-MCNC: 4.5 G/DL (ref 3.5–5.2)
ALBUMIN/GLOB SERPL: 1.5 G/DL
ALP SERPL-CCNC: 83 U/L (ref 39–117)
ALT SERPL W P-5'-P-CCNC: 38 U/L (ref 1–41)
ANION GAP SERPL CALCULATED.3IONS-SCNC: 15 MMOL/L (ref 5–15)
AST SERPL-CCNC: 28 U/L (ref 1–40)
BILIRUB SERPL-MCNC: 0.5 MG/DL (ref 0–1.2)
BUN SERPL-MCNC: 23 MG/DL (ref 6–20)
BUN/CREAT SERPL: 20 (ref 7–25)
CALCIUM SPEC-SCNC: 10.7 MG/DL (ref 8.6–10.5)
CHLORIDE SERPL-SCNC: 101 MMOL/L (ref 98–107)
CHOLEST SERPL-MCNC: 152 MG/DL (ref 0–200)
CO2 SERPL-SCNC: 22 MMOL/L (ref 22–29)
CREAT SERPL-MCNC: 1.15 MG/DL (ref 0.76–1.27)
EGFRCR SERPLBLD CKD-EPI 2021: 78 ML/MIN/1.73
GLOBULIN UR ELPH-MCNC: 3 GM/DL
GLUCOSE SERPL-MCNC: 108 MG/DL (ref 65–99)
HDLC SERPL-MCNC: 32 MG/DL (ref 40–60)
LDLC SERPL CALC-MCNC: 57 MG/DL (ref 0–100)
LDLC/HDLC SERPL: 1.17 {RATIO}
POTASSIUM SERPL-SCNC: 4.2 MMOL/L (ref 3.5–5.2)
PROT SERPL-MCNC: 7.5 G/DL (ref 6–8.5)
SODIUM SERPL-SCNC: 138 MMOL/L (ref 136–145)
TRIGL SERPL-MCNC: 413 MG/DL (ref 0–150)
TSH SERPL DL<=0.05 MIU/L-ACNC: 0.95 UIU/ML (ref 0.27–4.2)
VLDLC SERPL-MCNC: 63 MG/DL (ref 5–40)

## 2022-11-02 NOTE — TELEPHONE ENCOUNTER
PA for Dexcom sensors and reader device has been approved until 11/02/23. Approval letters placed to be scanned into chart.

## 2022-11-08 ENCOUNTER — HOSPITAL ENCOUNTER (OUTPATIENT)
Dept: CT IMAGING | Facility: HOSPITAL | Age: 47
Discharge: HOME | End: 2022-11-08
Admitting: NURSE PRACTITIONER

## 2022-11-08 ENCOUNTER — OFFICE VISIT (OUTPATIENT)
Dept: NEUROLOGY | Facility: CLINIC | Age: 47
End: 2022-11-08

## 2022-11-08 VITALS
BODY MASS INDEX: 38.36 KG/M2 | HEART RATE: 88 BPM | WEIGHT: 315 LBS | SYSTOLIC BLOOD PRESSURE: 150 MMHG | HEIGHT: 76 IN | TEMPERATURE: 98 F | DIASTOLIC BLOOD PRESSURE: 90 MMHG | OXYGEN SATURATION: 96 %

## 2022-11-08 DIAGNOSIS — R51.9 NEW ONSET HEADACHE: ICD-10-CM

## 2022-11-08 DIAGNOSIS — G47.33 OSA TREATED WITH BIPAP: Primary | ICD-10-CM

## 2022-11-08 DIAGNOSIS — E78.2 MIXED HYPERLIPIDEMIA: Chronic | ICD-10-CM

## 2022-11-08 DIAGNOSIS — R41.0 CONFUSION: ICD-10-CM

## 2022-11-08 DIAGNOSIS — I10 ESSENTIAL HYPERTENSION: ICD-10-CM

## 2022-11-08 PROCEDURE — 70450 CT HEAD/BRAIN W/O DYE: CPT

## 2022-11-08 PROCEDURE — 99214 OFFICE O/P EST MOD 30 MIN: CPT | Performed by: NURSE PRACTITIONER

## 2022-11-08 RX ORDER — ICOSAPENT ETHYL 1000 MG/1
CAPSULE ORAL
Qty: 360 CAPSULE | Refills: 1 | Status: SHIPPED | OUTPATIENT
Start: 2022-11-08 | End: 2023-03-15 | Stop reason: SDUPTHER

## 2022-11-08 NOTE — PROGRESS NOTES
"     Follow Up Office Visit      Patient Name: Raul Welch  : 1975   MRN: [id number]     Chief Complaint:    Chief Complaint   Patient presents with   • Follow-up     Patient in office to follow up on gonzález. Patient c/o feeling confused and frequent migraines. Patient states he doesn't feel like he's getting the \"full effect\" from his cpap machine.       History of Present Illness: Raul Welch is a 47 y.o. male who is here today to follow up with GONZÁLEZ and was last seen in 2021.  Currently on AutoBipap max IPAP 25cm and min EPAP 15cm, PS 2-8.  He is tolerating his pressures well.  He does feel no matter how tight he gets his nasal mask, it continues to leak and not seal well.  He states, \"I feel so sleepy all the time\".  He complains of having frequent headaches and feels they are getting worse.  He has been having these headaches for about 3 weeks.  He also complains of having episodes of confusion and states, \"Right now it's hard for me to concentrate on what you are saying\"- this has been going on for about 3 weeks.  He has had 21/21 headache days in the past 3 weeks, describes headaches as pressure to the top of his head, aching, accompanied by nausea, lasting less than 4 hours.  His blood pressure is elevated today but he says he has been checking his blood pressure and it hasn't been elevated at home.  He denies any history of head injury.  He denies any history of migraine disorder.  Additional risk factors- BMI , dyslipidemia, diabetes, HTN, GERD.   *Current compliance report requested for review from [employer].   *Recent labs reviewed.   *Normal EEG on 2021.     Following taken from previous visit note:  Raul Welch is a 46 y.o. male who is here today to follow up with GONZÁLEZ and was last seen on 2021 for vertigo.  He is currently on Bipap therapy-a current compliance report has been requested for review.  He feels he is tolerating his pressures pretty well.  He is " sleeping well.  He is sleeping about 5-7 hours per night.  He is using a nasal mask and [employer].  Additional risk factors- BMI 44, dyslipidemia, diabetes, HTN, GERD.      Subjective      Review of Systems:   Review of Systems   Constitutional: Negative for chills, fatigue and fever.   HENT: Negative for facial swelling, hearing loss, sore throat, tinnitus and trouble swallowing.    Eyes: Negative for blurred vision, double vision, photophobia and visual disturbance.   Respiratory: Positive for apnea. Negative for cough, chest tightness and shortness of breath.    Cardiovascular: Negative for chest pain, palpitations and leg swelling.   Gastrointestinal: Negative for abdominal pain, nausea and vomiting.   Endocrine: Negative for cold intolerance and heat intolerance.   Musculoskeletal: Negative for gait problem, neck pain and neck stiffness.   Skin: Negative for color change and rash.   Allergic/Immunologic: Negative for environmental allergies and food allergies.   Neurological: Positive for headache and confusion. Negative for dizziness, syncope, speech difficulty, weakness, light-headedness, numbness and memory problem.   Psychiatric/Behavioral: Positive for sleep disturbance. Negative for behavioral problems and depressed mood. The patient is not nervous/anxious.        I have reviewed and the following portions of the patient's history were updated as appropriate: past family history, past medical history, past social history, past surgical history and problem list.    Medications:     Current Outpatient Medications:   •  atorvastatin (LIPITOR) 40 MG tablet, TAKE 1 TABLET BY MOUTH EVERY DAY, Disp: 90 tablet, Rfl: 0  •  B-D UF III MINI PEN NEEDLES 31G X 5 MM misc, USE ONCE DAILY WITH INSULIN INJECTIONS AS DIRECTED, Disp: , Rfl: 11  •  Blood Glucose Monitoring Suppl device, Use as directed to check blood sugar, Disp: 1 each, Rfl: 0  •  cetirizine (ZyrTEC) 10 MG tablet, Take 12 mg by mouth Daily., Disp: , Rfl:    •  citalopram (CeleXA) 20 MG tablet, Take 20 mg by mouth Daily., Disp: , Rfl:   •  Continuous Blood Gluc  (Dexcom G6 ) device, 1 each Continuous., Disp: 1 each, Rfl: 0  •  Continuous Blood Gluc Sensor (Dexcom G6 Sensor), Every 10 (Ten) Days., Disp: 3 each, Rfl: 11  •  Continuous Blood Gluc Transmit (Dexcom G6 Transmitter) misc, 1 each Every 3 (Three) Months., Disp: 1 each, Rfl: 3  •  Dulaglutide (Trulicity) 3 MG/0.5ML solution pen-injector, Inject 0.5 mL under the skin into the appropriate area as directed 1 (One) Time Per Week. Dose increase, Disp: 6 mL, Rfl: 1  •  glucose blood (OneTouch Ultra) test strip, Test Three times daily, Disp: 100 each, Rfl: 3  •  glyburide (DIAbeta) 5 MG tablet, TAKE 2 TABLETS BY MOUTH EVERY DAY WITH breakfast, Disp: 180 tablet, Rfl: 0  •  hydrochlorothiazide (HYDRODIURIL) 25 MG tablet, Take 25 mg by mouth Daily., Disp: , Rfl:   •  icosapent ethyl (VASCEPA) 1 g capsule capsule, TAKE 2 CAPSULES BY MOUTH TWO TIMES A DAY WITH MEALS, Disp: 360 capsule, Rfl: 1  •  Insulin Pen Needle 32G X 6 MM misc, Use daily with insulin, Disp: 100 each, Rfl: 11  •  Invokana 300 MG tablet tablet, TAKE 1 TABLET BY MOUTH EVERY DAY, Disp: 90 tablet, Rfl: 1  •  Lantus SoloStar 100 UNIT/ML injection pen, inject 80 units under the skin into the appropriate area as directed every night, Disp: 72 mL, Rfl: 0  •  lisinopril (PRINIVIL,ZESTRIL) 40 MG tablet, Take 40 mg by mouth Daily., Disp: , Rfl:   •  meclizine (ANTIVERT) 25 MG tablet, Take 1 tablet by mouth 3 (Three) Times a Day As Needed for Dizziness., Disp: 20 tablet, Rfl: 2  •  metFORMIN (GLUCOPHAGE) 1000 MG tablet, TAKE 1 TABLET BY MOUTH TWO TIMES A DAY WITH MEALS, Disp: 180 tablet, Rfl: 0  •  nystatin-triamcinolone (MYCOLOG) 157879-4.1 UNIT/GM-% ointment, Apply to affected area 2 times daily, Disp: 30 g, Rfl: 1  •  Omega 3 1200 MG capsule, Take 1,000 mg by mouth 2 (Two) Times a Day., Disp: , Rfl:   •  ondansetron ODT (ZOFRAN-ODT) 4 MG  "disintegrating tablet, Place 1 tablet on the tongue Every 8 (Eight) Hours As Needed for Nausea or Vomiting., Disp: 12 tablet, Rfl: 0  •  pantoprazole (PROTONIX) 20 MG EC tablet, TAKE 1 TABLET BY MOUTH EVERY DAY, Disp: 30 tablet, Rfl: 0  •  vitamin D (ERGOCALCIFEROL) 1.25 MG (81848 UT) capsule capsule, Take 50,000 Units by mouth 1 (One) Time Per Week., Disp: , Rfl:     Allergies:   No Known Allergies    Objective     Physical Exam:  Vital Signs:   Vitals:    11/08/22 1527   BP: 150/90   BP Location: Right arm   Patient Position: Sitting   Cuff Size: Adult   Pulse: 88   Temp: 98 °F (36.7 °C)   SpO2: 96%   Weight: (!) 167 kg (369 lb)   Height: 193 cm (76\")   PainSc: 0-No pain     Body mass index is 44.92 kg/m².    Physical Exam  Vitals and nursing note reviewed.   Constitutional:       General: He is not in acute distress.     Appearance: He is well-developed. He is obese. He is not diaphoretic.   HENT:      Head: Normocephalic and atraumatic.   Eyes:      Extraocular Movements: Extraocular movements intact.      Conjunctiva/sclera: Conjunctivae normal.      Pupils: Pupils are equal, round, and reactive to light.   Pulmonary:      Effort: Pulmonary effort is normal. No respiratory distress.   Musculoskeletal:         General: Normal range of motion.   Skin:     General: Skin is warm and dry.      Findings: No rash.   Neurological:      Mental Status: He is alert and oriented to person, place, and time.      Cranial Nerves: Cranial nerves 2-12 are intact.   Psychiatric:         Mood and Affect: Mood normal.         Behavior: Behavior normal.         Thought Content: Thought content normal.         Judgment: Judgment normal.         Neurologic Exam     Mental Status   Oriented to person, place, and time.     Cranial Nerves   Cranial nerves II through XII intact.     CN III, IV, VI   Pupils are equal, round, and reactive to light.  No facial droop        Assessment / Plan      Assessment/Plan:   Diagnoses and all orders " for this visit:    1. GONZÁLEZ treated with BiPAP (Primary)    2. Confusion  -     CT Head Without Contrast; Future    3. New onset headache  -     CT Head Without Contrast; Future    4. Essential hypertension  -     CT Head Without Contrast; Future    5. BMI 32.0-32.9,adult    *Stat CT head without contrast to rule out acute CVA.   *Will review compliance report when it is received.    *Indications and possible SEs of Topiramate discussed with patient.  *I have advised patient to go to the ED immediately for any complaints of the worst headache of his life or for any signs or symptoms of stroke.   *Patient education on CVA, stroke prevention, migraine disorder, and Topiramate provided today.     Follow Up:   Return in about 2 weeks (around 11/22/2022) for Follow Up.    HUMPHREY Michele, FNP-C  [facility] Neurology and Sleep Medicine       Please note that portions of this note may have been completed with a voice recognition program. Efforts were made to edit the dictations, but occasionally words are mistranscribed.

## 2022-11-09 RX ORDER — TOPIRAMATE 25 MG/1
TABLET ORAL
Qty: 49 TABLET | Refills: 0 | Status: SHIPPED | OUTPATIENT
Start: 2022-11-09 | End: 2022-11-21

## 2022-11-21 ENCOUNTER — OFFICE VISIT (OUTPATIENT)
Dept: NEUROLOGY | Facility: CLINIC | Age: 47
End: 2022-11-21

## 2022-11-21 VITALS
DIASTOLIC BLOOD PRESSURE: 92 MMHG | TEMPERATURE: 98.6 F | WEIGHT: 315 LBS | HEART RATE: 70 BPM | SYSTOLIC BLOOD PRESSURE: 142 MMHG | HEIGHT: 76 IN | OXYGEN SATURATION: 98 % | BODY MASS INDEX: 38.36 KG/M2

## 2022-11-21 DIAGNOSIS — R51.9 NONINTRACTABLE EPISODIC HEADACHE, UNSPECIFIED HEADACHE TYPE: Primary | ICD-10-CM

## 2022-11-21 PROCEDURE — 99213 OFFICE O/P EST LOW 20 MIN: CPT | Performed by: NURSE PRACTITIONER

## 2022-11-21 NOTE — PROGRESS NOTES
"     Follow Up Office Visit      Patient Name: Raul Welch  : 1975   MRN: [id number]     Chief Complaint:    Chief Complaint   Patient presents with   • Follow-up     Feels like new medication isnt working well for him       History of Present Illness: Raul Welch is a 47 y.o. male who is here today to follow up and was last seen on 2022.  He is taking Topiramate 50mg BID and he feels that has not helped with his headaches but has made him \"feel bad\".  He continues to have \"random headaches throughout the day\" but nothing severe.  Nothing seems to make the headaches better or worse.  He says his memory is really bad and he asks if he could be \"heading towards dementia\".   *CT head without contrast was unremarkable on 2022.        Following taken from previous visit note:  Raul Welch is a 47 y.o. male who is here today to follow up with [facility]ON[facility] and was last seen in 2021.  Currently on AutoBipap max IPAP 25cm and min EPAP 15cm, PS 2-8.  He is tolerating his pressures well.  He does feel no matter how tight he gets his nasal mask, it continues to leak and not seal well.  He states, \"I feel so sleepy all the time\".  He complains of having frequent headaches and feels they are getting worse.  He has been having these headaches for about 3 weeks.  He also complains of having episodes of confusion and states, \"Right now it's hard for me to concentrate on what you are saying\"- this has been going on for about 3 weeks.  He has had 21/21 headache days in the past 3 weeks, describes headaches as pressure to the top of his head, aching, accompanied by nausea, lasting less than 4 hours.  His blood pressure is elevated today but he says he has been checking his blood pressure and it hasn't been elevated at home.  He denies any history of head injury.  He denies any history of migraine disorder.  Additional risk factors- BMI , dyslipidemia, diabetes, HTN, GERD.   *Current compliance " report requested for review from [facility].   *Recent labs reviewed.   *Normal EEG on 6/22/2021.     Subjective      Review of Systems:   Review of Systems   Constitutional: Negative for chills, fatigue and fever.   HENT: Negative for facial swelling, hearing loss, sore throat, tinnitus and trouble swallowing.    Eyes: Negative for blurred vision, double vision, photophobia and visual disturbance.   Respiratory: Negative for cough, chest tightness and shortness of breath.    Cardiovascular: Negative for chest pain, palpitations and leg swelling.   Gastrointestinal: Negative for abdominal pain, nausea and vomiting.   Endocrine: Negative for cold intolerance and heat intolerance.   Musculoskeletal: Negative for gait problem, neck pain and neck stiffness.   Skin: Negative for color change and rash.   Allergic/Immunologic: Negative for environmental allergies and food allergies.   Neurological: Positive for headache and memory problem. Negative for dizziness, syncope, speech difficulty, weakness, light-headedness and numbness.   Psychiatric/Behavioral: Negative for behavioral problems, sleep disturbance and depressed mood. The patient is not nervous/anxious.        I have reviewed and the following portions of the patient's history were updated as appropriate: past family history, past medical history, past social history, past surgical history and problem list.    Medications:     Current Outpatient Medications:   •  atorvastatin (LIPITOR) 40 MG tablet, TAKE 1 TABLET BY MOUTH EVERY DAY, Disp: 90 tablet, Rfl: 0  •  B-D UF III MINI PEN NEEDLES 31G X 5 MM misc, USE ONCE DAILY WITH INSULIN INJECTIONS AS DIRECTED, Disp: , Rfl: 11  •  Blood Glucose Monitoring Suppl device, Use as directed to check blood sugar, Disp: 1 each, Rfl: 0  •  cetirizine (ZyrTEC) 10 MG tablet, Take 12 mg by mouth Daily., Disp: , Rfl:   •  citalopram (CeleXA) 20 MG tablet, Take 20 mg by mouth Daily., Disp: , Rfl:   •  Continuous Blood Gluc   (Dexcom G6 ) device, 1 each Continuous., Disp: 1 each, Rfl: 0  •  Continuous Blood Gluc Sensor (Dexcom G6 Sensor), Every 10 (Ten) Days., Disp: 3 each, Rfl: 11  •  Continuous Blood Gluc Transmit (Dexcom G6 Transmitter) misc, 1 each Every 3 (Three) Months., Disp: 1 each, Rfl: 3  •  Dulaglutide (Trulicity) 3 MG/0.5ML solution pen-injector, Inject 0.5 mL under the skin into the appropriate area as directed 1 (One) Time Per Week. Dose increase, Disp: 6 mL, Rfl: 1  •  glucose blood (OneTouch Ultra) test strip, Test Three times daily, Disp: 100 each, Rfl: 3  •  glyburide (DIAbeta) 5 MG tablet, TAKE 2 TABLETS BY MOUTH EVERY DAY WITH breakfast, Disp: 180 tablet, Rfl: 0  •  hydrochlorothiazide (HYDRODIURIL) 25 MG tablet, Take 25 mg by mouth Daily., Disp: , Rfl:   •  icosapent ethyl (VASCEPA) 1 g capsule capsule, TAKE 2 CAPSULES BY MOUTH TWO TIMES A DAY WITH MEALS, Disp: 360 capsule, Rfl: 1  •  Insulin Pen Needle 32G X 6 MM misc, Use daily with insulin, Disp: 100 each, Rfl: 11  •  Invokana 300 MG tablet tablet, TAKE 1 TABLET BY MOUTH EVERY DAY, Disp: 90 tablet, Rfl: 1  •  Lantus SoloStar 100 UNIT/ML injection pen, inject 80 units under the skin into the appropriate area as directed every night, Disp: 72 mL, Rfl: 0  •  lisinopril (PRINIVIL,ZESTRIL) 40 MG tablet, Take 40 mg by mouth Daily., Disp: , Rfl:   •  meclizine (ANTIVERT) 25 MG tablet, Take 1 tablet by mouth 3 (Three) Times a Day As Needed for Dizziness., Disp: 20 tablet, Rfl: 2  •  metFORMIN (GLUCOPHAGE) 1000 MG tablet, TAKE 1 TABLET BY MOUTH TWO TIMES A DAY WITH MEALS, Disp: 180 tablet, Rfl: 0  •  nystatin-triamcinolone (MYCOLOG) 220924-9.1 UNIT/GM-% ointment, Apply to affected area 2 times daily, Disp: 30 g, Rfl: 1  •  Omega 3 1200 MG capsule, Take 1,000 mg by mouth 2 (Two) Times a Day., Disp: , Rfl:   •  ondansetron ODT (ZOFRAN-ODT) 4 MG disintegrating tablet, Place 1 tablet on the tongue Every 8 (Eight) Hours As Needed for Nausea or Vomiting., Disp: 12  "tablet, Rfl: 0  •  pantoprazole (PROTONIX) 20 MG EC tablet, TAKE 1 TABLET BY MOUTH EVERY DAY, Disp: 30 tablet, Rfl: 0  •  vitamin D (ERGOCALCIFEROL) 1.25 MG (71450 UT) capsule capsule, Take 50,000 Units by mouth 1 (One) Time Per Week., Disp: , Rfl:     Allergies:   No Known Allergies    Objective     Physical Exam:  Vital Signs:   Vitals:    11/21/22 1545   BP: 142/92   Pulse: 70   Temp: 98.6 °F (37 °C)   TempSrc: Temporal   SpO2: 98%   Weight: (!) 170 kg (374 lb 9.6 oz)   Height: 193 cm (76\")   PainSc:   6   PainLoc: Head     Body mass index is 45.6 kg/m².    Physical Exam  Vitals and nursing note reviewed.   Constitutional:       General: He is not in acute distress.     Appearance: He is well-developed. He is obese. He is not diaphoretic.   HENT:      Head: Normocephalic and atraumatic.   Eyes:      Extraocular Movements: Extraocular movements intact.      Conjunctiva/sclera: Conjunctivae normal.      Pupils: Pupils are equal, round, and reactive to light.   Cardiovascular:      Heart sounds:     No gallop.   Pulmonary:      Effort: Pulmonary effort is normal. No respiratory distress.   Musculoskeletal:         General: Normal range of motion.   Skin:     General: Skin is warm and dry.      Findings: No rash.   Neurological:      Mental Status: He is alert and oriented to person, place, and time.   Psychiatric:         Mood and Affect: Mood normal.         Behavior: Behavior normal.         Thought Content: Thought content normal.         Judgment: Judgment normal.         Neurologic Exam     Mental Status   Oriented to person, place, and time.     Cranial Nerves     CN III, IV, VI   Pupils are equal, round, and reactive to light.       Assessment / Plan      Assessment/Plan:   Diagnoses and all orders for this visit:    1. Nonintractable episodic headache, unspecified headache type (Primary)    2. BMI 45.0-49.9, adult (HCC)     *I have advised patient to discuss his headache and elevated blood pressure with his " PCP and he is agreeable. I have encouraged weight loss with a BMI goal of 24- he says he is going to get a referral, from his PCP, for weight loss surgery.   *Advised him to call 911 for any symptoms of stroke or complaints of the worst headache of his life.     Follow Up:   Return in about 1 year (around 11/21/2023) for F/U Obstructive Sleep Apnea.    HUMPHREY Michele, FNP-C  [facility] Neurology and Sleep Medicine       Please note that portions of this note may have been completed with a voice recognition program. Efforts were made to edit the dictations, but occasionally words are mistranscribed.

## 2022-12-12 ENCOUNTER — TELEPHONE (OUTPATIENT)
Dept: ENDOCRINOLOGY | Facility: CLINIC | Age: 47
End: 2022-12-12

## 2022-12-12 DIAGNOSIS — Z79.4 CONTROLLED TYPE 2 DIABETES MELLITUS WITH HYPOGLYCEMIA, WITH LONG-TERM CURRENT USE OF INSULIN: Chronic | ICD-10-CM

## 2022-12-12 DIAGNOSIS — E11.649 CONTROLLED TYPE 2 DIABETES MELLITUS WITH HYPOGLYCEMIA, WITH LONG-TERM CURRENT USE OF INSULIN: Chronic | ICD-10-CM

## 2022-12-12 RX ORDER — PROCHLORPERAZINE 25 MG/1
1 SUPPOSITORY RECTAL
Qty: 1 EACH | Refills: 3 | Status: SHIPPED | OUTPATIENT
Start: 2022-12-12

## 2022-12-12 NOTE — TELEPHONE ENCOUNTER
Please call to [facility] [place]   Pt needs his dexcom transmitters    pts number  [phone]   [Dear  ___] : Dear  [unfilled], [Consult Letter:] : I had the pleasure of evaluating your patient, [unfilled]. [Please see my note below.] : Please see my note below. [Sincerely,] : Sincerely, [Consult Closing:] : Thank you very much for allowing me to participate in the care of this patient.  If you have any questions, please do not hesitate to contact me. [Logan Means MD] : Logan Means MD [FreeTextEntry3] : Logan Means MD, FCCP, D. ABSM\par Pulmonary and Sleep Medicine\par [facility] [place]

## 2023-01-07 DIAGNOSIS — E11.42 WELL CONTROLLED TYPE 2 DIABETES MELLITUS WITH PERIPHERAL NEUROPATHY: ICD-10-CM

## 2023-01-07 DIAGNOSIS — E11.65 UNCONTROLLED TYPE 2 DIABETES MELLITUS WITH HYPERGLYCEMIA: Chronic | ICD-10-CM

## 2023-01-07 DIAGNOSIS — E78.2 MIXED HYPERLIPIDEMIA: ICD-10-CM

## 2023-01-09 ENCOUNTER — PRIOR AUTHORIZATION (OUTPATIENT)
Dept: ENDOCRINOLOGY | Facility: CLINIC | Age: 48
End: 2023-01-09
Payer: COMMERCIAL

## 2023-01-09 RX ORDER — GLYBURIDE 5 MG/1
TABLET ORAL
Qty: 180 TABLET | Refills: 1 | Status: SHIPPED | OUTPATIENT
Start: 2023-01-09 | End: 2023-02-14 | Stop reason: SDUPTHER

## 2023-01-09 RX ORDER — CANAGLIFLOZIN 300 MG/1
TABLET, FILM COATED ORAL
Qty: 90 TABLET | Refills: 1 | Status: SHIPPED | OUTPATIENT
Start: 2023-01-09

## 2023-01-09 RX ORDER — ATORVASTATIN CALCIUM 40 MG/1
TABLET, FILM COATED ORAL
Qty: 90 TABLET | Refills: 1 | Status: SHIPPED | OUTPATIENT
Start: 2023-01-09

## 2023-02-08 ENCOUNTER — TELEPHONE (OUTPATIENT)
Dept: ENDOCRINOLOGY | Facility: CLINIC | Age: 48
End: 2023-02-08
Payer: COMMERCIAL

## 2023-02-08 NOTE — TELEPHONE ENCOUNTER
PT CALLED STATING HE KNOCKED OFF ACOUPLE OF DEXCOM SENSORS AND ONE WAS DEFECTIVE. HE REQUESTED A CALL BACK ABOUT GETTING SAMPLES.

## 2023-02-08 NOTE — TELEPHONE ENCOUNTER
Spoke with patient and he said all is taken care of. He had refills at his pharmacy and did not know. Said he would let us know if he needed anything else.

## 2023-02-14 ENCOUNTER — OFFICE VISIT (OUTPATIENT)
Dept: ENDOCRINOLOGY | Facility: CLINIC | Age: 48
End: 2023-02-14
Payer: COMMERCIAL

## 2023-02-14 VITALS
BODY MASS INDEX: 38.36 KG/M2 | OXYGEN SATURATION: 95 % | WEIGHT: 315 LBS | HEART RATE: 92 BPM | SYSTOLIC BLOOD PRESSURE: 110 MMHG | DIASTOLIC BLOOD PRESSURE: 72 MMHG | HEIGHT: 76 IN

## 2023-02-14 DIAGNOSIS — E11.65 UNCONTROLLED TYPE 2 DIABETES MELLITUS WITH HYPERGLYCEMIA: Primary | ICD-10-CM

## 2023-02-14 DIAGNOSIS — E83.52 HYPERCALCEMIA: ICD-10-CM

## 2023-02-14 LAB
EXPIRATION DATE: NORMAL
EXPIRATION DATE: NORMAL
GLUCOSE BLDC GLUCOMTR-MCNC: 126 MG/DL (ref 70–130)
HBA1C MFR BLD: 6.9 %
Lab: NORMAL
Lab: NORMAL

## 2023-02-14 PROCEDURE — 82330 ASSAY OF CALCIUM: CPT | Performed by: PHYSICIAN ASSISTANT

## 2023-02-14 PROCEDURE — 99214 OFFICE O/P EST MOD 30 MIN: CPT | Performed by: PHYSICIAN ASSISTANT

## 2023-02-14 PROCEDURE — 83970 ASSAY OF PARATHORMONE: CPT | Performed by: PHYSICIAN ASSISTANT

## 2023-02-14 PROCEDURE — 3044F HG A1C LEVEL LT 7.0%: CPT | Performed by: PHYSICIAN ASSISTANT

## 2023-02-14 PROCEDURE — 82043 UR ALBUMIN QUANTITATIVE: CPT | Performed by: PHYSICIAN ASSISTANT

## 2023-02-14 PROCEDURE — 82570 ASSAY OF URINE CREATININE: CPT | Performed by: PHYSICIAN ASSISTANT

## 2023-02-14 PROCEDURE — 83036 HEMOGLOBIN GLYCOSYLATED A1C: CPT | Performed by: PHYSICIAN ASSISTANT

## 2023-02-14 PROCEDURE — 80053 COMPREHEN METABOLIC PANEL: CPT | Performed by: PHYSICIAN ASSISTANT

## 2023-02-14 PROCEDURE — 95251 CONT GLUC MNTR ANALYSIS I&R: CPT | Performed by: PHYSICIAN ASSISTANT

## 2023-02-14 PROCEDURE — 82306 VITAMIN D 25 HYDROXY: CPT | Performed by: PHYSICIAN ASSISTANT

## 2023-02-14 RX ORDER — GLYBURIDE 5 MG/1
5 TABLET ORAL
Qty: 90 TABLET | Refills: 1
Start: 2023-02-14

## 2023-02-14 NOTE — PROGRESS NOTES
Chief Complaint  F/u for Diabetes Mellitus.    HPI   Raul Welch is a 47 y.o. male who is here today for f/u of Diabetes Mellitus type 2. The initial diagnosis of diabetes was made in his 20s.     Has been using Dexcom since last visit. He likes it a lot.     Diabetic complications: peripheral neuropathy  Eye exam current (within one year): [facility] [employer] [place]    Current diabetic medications include:  Metformin 1000mg BID  Invokana 300 mg daily  Glybride 5 mg 2 pill daily AC  Trulicity 3 mg weekly  Lantus 70u qhs    Statin: lipitor 40, changed from zocor 20 6/2020, vascepa started 6/2020    Past medications: ozempic 0.5 mg (changed to trulicity due to insurance preference), basaglar, Steglatro, glyburide    Diabetic Monitoring  -   dexcom downloaded and reviewed (2/1/23-2/14/23): time in range 88%, fasting -180, most pp readings <180, occasional hypoglycemia during the day    Freestyle Jose was expensive.     The following portions of the patient's history were reviewed and updated by me as appropriate: allergies, current medications, past family history, past social history, past surgical history and problem list.    Past Medical History:   Diagnosis Date   • Arthritis    • Asthma     SPOUSE REPORTS ISSUES AS A CHILD   • Back pain    • Bell's palsy    • Blood in stool    • Depression    • Diabetes (HCC)    • Diarrhea     WITH ABDOMINAL CRAMPING   • Diverticulitis    • Elevated cholesterol    • GERD (gastroesophageal reflux disease)    • Headache    • High cholesterol    • History of being tatooed    • History of bronchitis    • History of fracture     HX OF FOOT (SPOUSE UNSURE LATERALITY)   • History of gout    • Hypertension    • Myocardial infarction (HCC) 1997   • Sleep apnea     USES BIPAP HS - TO BRING IN THE DOS   • Tattoos    • Type 2 diabetes mellitus (HCC)    • Wears glasses        Medications    Current Outpatient Medications:   •  atorvastatin (LIPITOR) 40 MG tablet, TAKE 1 TABLET BY  MOUTH EVERY DAY, Disp: 90 tablet, Rfl: 1  •  B-D UF III MINI PEN NEEDLES 31G X 5 MM misc, USE ONCE DAILY WITH INSULIN INJECTIONS AS DIRECTED, Disp: , Rfl: 11  •  Blood Glucose Monitoring Suppl device, Use as directed to check blood sugar, Disp: 1 each, Rfl: 0  •  cetirizine (ZyrTEC) 10 MG tablet, Take 12 mg by mouth Daily., Disp: , Rfl:   •  citalopram (CeleXA) 20 MG tablet, Take 20 mg by mouth Daily., Disp: , Rfl:   •  Continuous Blood Gluc  (Dexcom G6 ) device, 1 each Continuous., Disp: 1 each, Rfl: 0  •  Continuous Blood Gluc Sensor (Dexcom G6 Sensor), Every 10 (Ten) Days., Disp: 3 each, Rfl: 11  •  Continuous Blood Gluc Transmit (Dexcom G6 Transmitter) misc, 1 each Every 3 (Three) Months., Disp: 1 each, Rfl: 3  •  Dulaglutide (Trulicity) 3 MG/0.5ML solution pen-injector, Inject 0.5 mL under the skin into the appropriate area as directed 1 (One) Time Per Week. Dose increase, Disp: 6 mL, Rfl: 1  •  glucose blood (OneTouch Ultra) test strip, Test Three times daily, Disp: 100 each, Rfl: 3  •  glyburide (DIAbeta) 5 MG tablet, Take 1 tablet by mouth Daily With Breakfast., Disp: 90 tablet, Rfl: 1  •  hydrochlorothiazide (HYDRODIURIL) 25 MG tablet, Take 25 mg by mouth Daily., Disp: , Rfl:   •  icosapent ethyl (VASCEPA) 1 g capsule capsule, TAKE 2 CAPSULES BY MOUTH TWO TIMES A DAY WITH MEALS, Disp: 360 capsule, Rfl: 1  •  Insulin Pen Needle 32G X 6 MM misc, Use daily with insulin, Disp: 100 each, Rfl: 11  •  Invokana 300 MG tablet tablet, TAKE 1 TABLET BY MOUTH EVERY DAY, Disp: 90 tablet, Rfl: 1  •  Lantus SoloStar 100 UNIT/ML injection pen, inject 80 units under the skin into the appropriate area as directed every night, Disp: 72 mL, Rfl: 0  •  lisinopril (PRINIVIL,ZESTRIL) 40 MG tablet, Take 40 mg by mouth Daily., Disp: , Rfl:   •  meclizine (ANTIVERT) 25 MG tablet, Take 1 tablet by mouth 3 (Three) Times a Day As Needed for Dizziness., Disp: 20 tablet, Rfl: 2  •  metFORMIN (GLUCOPHAGE) 1000 MG tablet,  "TAKE 1 TABLET BY MOUTH TWO TIMES A DAY WITH MEALS, Disp: 180 tablet, Rfl: 1  •  nystatin-triamcinolone (MYCOLOG) 880847-5.1 UNIT/GM-% ointment, Apply to affected area 2 times daily, Disp: 30 g, Rfl: 1  •  Omega 3 1200 MG capsule, Take 1,000 mg by mouth 2 (Two) Times a Day., Disp: , Rfl:   •  ondansetron ODT (ZOFRAN-ODT) 4 MG disintegrating tablet, Place 1 tablet on the tongue Every 8 (Eight) Hours As Needed for Nausea or Vomiting., Disp: 12 tablet, Rfl: 0  •  pantoprazole (PROTONIX) 20 MG EC tablet, TAKE 1 TABLET BY MOUTH EVERY DAY, Disp: 30 tablet, Rfl: 0  •  vitamin D (ERGOCALCIFEROL) 1.25 MG (92399 UT) capsule capsule, Take 50,000 Units by mouth 1 (One) Time Per Week., Disp: , Rfl:     Review of Systems  Review of Systems   All other systems reviewed and are negative.       Physical Exam    /72   Pulse 92   Ht 193 cm (76\")   Wt (!) 164 kg (362 lb)   SpO2 95%   BMI 44.06 kg/m² Body mass index is 44.06 kg/m².  Physical Exam   Constitutional: He is oriented to person, place, and time. He appears well-developed. No distress.   HENT:   Head: Normocephalic.   Right Ear: External ear normal.   Left Ear: External ear normal.   Nose: Nose normal.   Eyes: Conjunctivae are normal. Right eye exhibits no discharge. Left eye exhibits no discharge. No scleral icterus.   Neck: No JVD present. No tracheal deviation present. No thyromegaly present.   Cardiovascular: Normal rate, regular rhythm and normal heart sounds.   No murmur heard.  Pulmonary/Chest: Effort normal and breath sounds normal. No respiratory distress. He has no wheezes.   Musculoskeletal: No tenderness.   Neurological: He is alert and oriented to person, place, and time.   Skin: Skin is warm and dry. No rash noted. He is not diaphoretic. No erythema.   Psychiatric: His behavior is normal. Judgment and thought content normal.       Labs and Imaging   Lab Results   Component Value Date    HGBA1C 6.9 02/14/2023    HGBA1C 7.9 11/01/2022    HGBA1C 7.5 " 07/13/2022     Office Visit on 02/14/2023   Component Date Value Ref Range Status   • Hemoglobin A1C 02/14/2023 6.9  % Final   • Lot Number 02/14/2023 [id number]   Final   • Expiration Date 02/14/2023 11-   Final   • Glucose 02/14/2023 126  70 - 130 mg/dL Final   • Lot Number 02/14/2023 [id number]   Final   • Expiration Date 02/14/2023 07-   Final     Vitamin D,25-Hydroxy (11/01/2022 16:00)  TSH (11/01/2022 16:00)  Lipid Panel (11/01/2022 16:00)  Comprehensive Metabolic Panel (11/01/2022 16:00)    Assessment / Plan   Diagnoses and all orders for this visit:    1. Uncontrolled type 2 diabetes mellitus with hyperglycemia (HCC) (Primary)  -     POC Glycosylated Hemoglobin (Hb A1C)  -     POC Glucose, Blood  -     glyburide (DIAbeta) 5 MG tablet; Take 1 tablet by mouth Daily With Breakfast.  Dispense: 90 tablet; Refill: 1  -     Microalbumin / Creatinine Urine Ratio - Urine, Clean Catch    2. Hypercalcemia  -     Comprehensive Metabolic Panel  -     Calcium, Ionized  -     PTH, Intact  -     Vitamin D,25-Hydroxy        Diabetes Mellitus 2 is under inadequate control  -A1c 6.9, down from 7.9 last visit  -88% time in range  -some day time hypoglycemia  -continue trulicity 3 mg weekly, he has some mild side effects so no dose increase  -continue Invokana 300 mg daily  -decrease glyburide 5 mg to just one pill daily AC to decrease risk of hypoglycemia  -cont metformin 1000mg BID  -continue lantus 70 u daily  -continue to work on diet  -eye exam utd  -Labs updated utd, check urine albumin today  -Foot exam updated 7/2022    Mixed hyperlipidemia  -lipids today  -continue lipitor 40mg qhs, continue vascepa    Hypercalcemia  -mild elevation in serum calcium on labs last visit  -he is on hctz, which is a possibly culprit  -repeat cmp, add ionized calcium, vit d, pth    There are no Patient Instructions on file for this visit.    Follow up: Return in about 3 months (around 5/14/2023).      Jo-Ann Clark,  HERBERT

## 2023-02-15 LAB
25(OH)D3 SERPL-MCNC: 24.7 NG/ML (ref 30–100)
ALBUMIN SERPL-MCNC: 4.6 G/DL (ref 3.5–5.2)
ALBUMIN UR-MCNC: <1.2 MG/DL
ALBUMIN/GLOB SERPL: 1.5 G/DL
ALP SERPL-CCNC: 79 U/L (ref 39–117)
ALT SERPL W P-5'-P-CCNC: 54 U/L (ref 1–41)
ANION GAP SERPL CALCULATED.3IONS-SCNC: 11.5 MMOL/L (ref 5–15)
AST SERPL-CCNC: 45 U/L (ref 1–40)
BILIRUB SERPL-MCNC: 0.5 MG/DL (ref 0–1.2)
BUN SERPL-MCNC: 22 MG/DL (ref 6–20)
BUN/CREAT SERPL: 21.2 (ref 7–25)
CA-I BLD-MCNC: 5.3 MG/DL (ref 4.6–5.4)
CA-I SERPL ISE-MCNC: 1.33 MMOL/L (ref 1.15–1.35)
CALCIUM SPEC-SCNC: 10.3 MG/DL (ref 8.6–10.5)
CHLORIDE SERPL-SCNC: 104 MMOL/L (ref 98–107)
CO2 SERPL-SCNC: 25.5 MMOL/L (ref 22–29)
CREAT SERPL-MCNC: 1.04 MG/DL (ref 0.76–1.27)
CREAT UR-MCNC: 49.5 MG/DL
EGFRCR SERPLBLD CKD-EPI 2021: 88 ML/MIN/1.73
GLOBULIN UR ELPH-MCNC: 3 GM/DL
GLUCOSE SERPL-MCNC: 98 MG/DL (ref 65–99)
MICROALBUMIN/CREAT UR: NORMAL MG/G{CREAT}
POTASSIUM SERPL-SCNC: 4.1 MMOL/L (ref 3.5–5.2)
PROT SERPL-MCNC: 7.6 G/DL (ref 6–8.5)
PTH-INTACT SERPL-MCNC: 23.4 PG/ML (ref 15–65)
SODIUM SERPL-SCNC: 141 MMOL/L (ref 136–145)

## 2023-03-15 DIAGNOSIS — Z79.4 CONTROLLED TYPE 2 DIABETES MELLITUS WITH HYPOGLYCEMIA, WITH LONG-TERM CURRENT USE OF INSULIN: Chronic | ICD-10-CM

## 2023-03-15 DIAGNOSIS — E78.2 MIXED HYPERLIPIDEMIA: Chronic | ICD-10-CM

## 2023-03-15 DIAGNOSIS — E11.649 CONTROLLED TYPE 2 DIABETES MELLITUS WITH HYPOGLYCEMIA, WITH LONG-TERM CURRENT USE OF INSULIN: Chronic | ICD-10-CM

## 2023-03-15 RX ORDER — ICOSAPENT ETHYL 1000 MG/1
2 CAPSULE ORAL 2 TIMES DAILY WITH MEALS
Qty: 360 CAPSULE | Refills: 1 | Status: SHIPPED | OUTPATIENT
Start: 2023-03-15

## 2023-03-15 RX ORDER — DULAGLUTIDE 3 MG/.5ML
3 INJECTION, SOLUTION SUBCUTANEOUS WEEKLY
Qty: 6 ML | Refills: 1 | Status: SHIPPED | OUTPATIENT
Start: 2023-03-15

## 2023-04-14 DIAGNOSIS — Z79.4 CONTROLLED TYPE 2 DIABETES MELLITUS WITH HYPOGLYCEMIA, WITH LONG-TERM CURRENT USE OF INSULIN: ICD-10-CM

## 2023-04-14 DIAGNOSIS — E11.649 CONTROLLED TYPE 2 DIABETES MELLITUS WITH HYPOGLYCEMIA, WITH LONG-TERM CURRENT USE OF INSULIN: ICD-10-CM

## 2023-04-17 RX ORDER — DULAGLUTIDE 1.5 MG/.5ML
INJECTION, SOLUTION SUBCUTANEOUS
Qty: 6 ML | Refills: 0 | OUTPATIENT
Start: 2023-04-17

## 2023-04-24 ENCOUNTER — TELEPHONE (OUTPATIENT)
Dept: ENDOCRINOLOGY | Facility: CLINIC | Age: 48
End: 2023-04-24
Payer: COMMERCIAL

## 2023-04-24 NOTE — TELEPHONE ENCOUNTER
Spoke with patient and advised for him to call dexcom and see if they can replace his . He voiced understanding and will let us know what they say.

## 2023-04-24 NOTE — TELEPHONE ENCOUNTER
October 10, 2019     Maya LUND Francois  [address]  [facility] [address]      Colonoscopy Instructions - Suprep with MAC sedation       Nisa Finn DO  [phone]    Date of Procedure: November 7, 2019  Arrive to register at: 5:45 AM  Place: [facility] - Day Surgery  [address] [place] [id number]  [phone]  Please enter through the main doors near the Physician's [place] and check in at Day Surgery Registration.       Pre-Admit :  Michelle MITTAL [phone]    Please read the entire instructions as soon as you receive them.    Patient Checklist  7 Days prior to your procedure  · If possible, try to avoid non-steroidal anti-inflammatory drugs (ibuprofen, Advil, Motrin, etc.).   · If you are on Plavix, please follow the instructions discussed at your clinic visit. If any questions call the office for more information.    · Stop taking iron supplements     5 Days prior to your procedure  · If you are on Coumadin, hold it as instructed at your visit. If any questions call the office for more information.  · Confirm you have a  to take you home following the procedure. You cannot take a taxi cab, bus, or public transport home alone.   · Obtain your Suprep from the pharmacy    2 Days before your procedure  · Avoid high fiber foods such as vegetables, popcorn, nuts etc.    1 Day before your procedure  · No solid food. No alcohol.   · Clear liquids ALL DAY. If you can see through it, you can drink it. Examples are clear broth or consommé, fruit juices without pulp (no orange or tomato), sports drinks (Gatorade, Propel etc.), Jell-o (no fruit added), coffee or tea (sweeteners are ok, no milk or cream), soda or non-alcoholic carbonated drinks, popsicles, water, and clear hard candies. Avoid red or purple colors.   · It is important to try and stay hydrated during the day by drinking fluids. A solution such as Gatorade or a similar sports drink will help you to stay  PATIENT IS RETURNING OUR CALL. PHONE NUMBER [phone]   hydrated.   · At 3:00 pm take 20 mg Dulcolax (over the counter) with 8 oz. of water (either two 10 mg tablets or four 5 mg tablets).   · At 5:00 PM, pour one (1) 6-ounce bottle of Suprep liquid into the mixing container. Add cool drinking water to the 16-ounce line on the container and mix. Drink all the liquid in the container. Then drink two (2) more 16-ounce containers of water over the next 1 hour.      Day of the procedure  · No solid food. No alcohol-water only until 3 hours prior to your procedure.  You should not drink or take anything by mouth 3 hours prior to your procedure.  · At 2:45 AM (5 hrs prior to the procedure), pour one (1) 6-ounce bottle of Suprep liquid into the mixing container. Add cool drinking water to the 16-ounce line on the container and mix. Drink all the liquid in the container. Then drink two (2) more 16-ounce containers of water over the next 1 hour.  · You can take your medications with a sip of water up to 3 hours from your scheduled procedure, except for diabetic medications or blood thinners as previously instructed.      If you have any questions about the process, please call the office.     Special instructions  Diabetics - Contact your physician or my office for instructions on your diabetic medications, including insulin. In general, hold your diabetic medications the day of the procedure. Check your blood sugars frequently the day you are on a clear liquid diet.      Blood Thinners - Including dabigatran (Pradaxa), clopidogril (Plavix), warfarin (Coumadin), dipyridamole ( Persantine), rivaroxaban (Xarelto). Please contact my office if you are on these medications for instructions.     Kidney disease - If you have any problems with kidney disease. You should not take this prep. Please call my office, and we will prescribe a safer alternative    Frequently Asked Questions    What is a colonoscopy?   A colonoscopy is a procedure where we can examine your large intestine for  abnormalities. A flexible tube that has a camera and light at the tip and as thick as your finger is used in the exam.     How long is the colon?   The average length of the colon is 4 to 5 feet.     Why do I need to take the preparation and clear liquid diet?  The most important part in a successful exam is the preparation. It is important to clean your colon completely prior to the exam. If there is still remaining stool in the colon, it can prolong your procedure, and we may not be able to visualize the entire colon and lesions could be missed. If you have a substantial amount of stool remaining, the procedure may be terminated and a repeat or alternative prep may be required.    We have you take the Suprep the evening prior and morning of the procedure since studies have shown that taking the prep in 2 separate doses help improve the cleansing of the colon.     Is there an alternative to the Suprep?  In the past, most preps were performed with Fleets Phospho-Soda. However recent studies have shown the risk of causing permanent kidney damage/failure. Due to the risk of permanent kidney damage with the Fleet Phospho-Soda, most gastroenterologists have stopped using it. There is a prep taking pills; however this has the same medication as the Fleets Phospho-Soda, so the risk of kidney damage is still present.     There are alternatives to Suprep that may cost less, however it is 2 to 4 liters of total volume. If you would like the alternative, then contact my office.      If people have problems taking the prep at home, there is an alternative where you can take the entire preparation in the hospital the day of the procedure.     Can I take all my medications?  Most medications can be continued without problems. However please contact my office if you take diabetic medications, blood thinners such as Plavix, Coumadin, Warfarin, arthritis medications, non-steroidal anti-inflammatory (ibuprofen, Motrin etc.), or iron  supplements    What can I expect the day of the procedure?  You will arrive at the facility where you are scheduled. We have you arrive early since you will need to fill out your information. An IV will be placed so we can give you medications. Once in the procedure room, I will talk to you and answer any questions you may have. The anesthesiologist will talk to you before the procedure.     You will usually lie on your left side. I will then advance the scope to the end of your large intestine. During the procedure it is normal to feel some pressure, bloating or cramping. Careful examination will be performed throughout your colon. Then entire procedure takes approximately 30 minutes, however this can be prolonged in complicated cases.    Once the procedure is complete, you will be brought to the recovery room until you are stable to leave. You should plan on being at the procedure site for 2 to 3 hours.     What if something abnormal is found during the colonoscopy?  Most polyps can be removed at the time of the colonoscopy. Biopsies, tissue samples, can be obtained during the exam.     What are polyps?  Polyps are abnormal growth of colon tissue. A majority are benign or non-cancerous. All polyps that are removed are sent to the lab for analysis. Some polyps have the potential of transforming into cancer. This is why it is important to remove them while the are small and non-cancerous. Polyps can range from a couple of millimeters to a couple of inches.     How are the polyps removed?  The polyps can be removed by biopsy instruments. Some polyps are removed with a wire snare and cautery. Cautery produces an electrical current to help burn and remove the polyps. You should not feel any pain with removal of the polyps. The polyps are then retrieved and sent to a lab to be analyzed.     What will happen after the procedure?  I will discuss the findings with you. If tissue samples/polyps are removed, I will give you  a call in approximately 1 week with the results. If you do not hear from me in 2 weeks, you can call my office for results. Your family history, your personal history, and the number and type of polyps you have will determine when you may need a repeat exam.     Even if you feel alert after the procedure, your judgment and reflexes may be impaired the rest of the day. You should not drive, work machinery or perform any other task that requires your full attention.    You may have bloating and cramping after the exam. This usually is improved with passing of gas.    Normally you may resume a regular diet after the procedure is completed. If you are on a blood thinner, this may be held if a large polyp is removed.    Why do I need some one to accompany me to the exam?  Because you are given a sedative, you need someone you know to drive you home after the exam. If you are taking a bus, taxi or van service a responsible adult you know must accompany you. If this is a problem, the colonoscopy can be attempted without any sedation.     What are the complications of the exam?  Colonoscopies are relatively safe, however complications can occur. Some but not all of the risk are discussed below.  Some patients may have an adverse reaction with the sedation or complications from heart and lung disease. There is also the risk of causing bleeding and perforation (poking a hole in the colon). If these complications do occur, they may need surgical treatment. There are also a lot of large folds in the colon, so all of the walls of the colon cannot be visualized, and lesions can be missed.     After the procedure, if you have any abdominal pain, fever, chills, or rectal bleeding it is important to notify me or seek immediate medical attention.  It is important to know that bleeding can occur even several days after the procedure.     Information above was obtained and revised from the American Society for Gastrointestinal  Endoscopy web site.   Http://www.asge.org/PatientInfoIndex.aspx?qt=470. April 21st 2009.    MAC sedation/Anesthesia assistance    This is additional information about the sedation. You will be undergoing sedation under the assistance of the Anesthesia department. They will decide about what anesthesia would be in your best interest.    Due to the increase in potency of the medication that they use, this can increase the the risk of side effects compared to the regular conscious sedation. Any questions about the sedation should be directed to your anesthesiologist. You will meet them on the day of your procedure.    The Anesthesia Department has requested the following discontinuation of the following medications prior to your procedure:    • Phentermine - hold 10 days prior to your procedure  • Selegiline patches - hold 10 days prior to your procedure  • Sildenafil, Verdenafil, Tadalafil (only if taken for erectile dysfunction)- hold 48 hours prior to procedure  • Short acting insulin - hold the day of the procedure

## 2023-04-24 NOTE — TELEPHONE ENCOUNTER
Patient called office, stated that his touch screen on his Dexcom G6 has cracked and he can no longer use it. He was wanting to know if there was a way he could replace it. He would like a call back. Thank you.

## 2023-05-17 DIAGNOSIS — E11.649 CONTROLLED TYPE 2 DIABETES MELLITUS WITH HYPOGLYCEMIA, WITH LONG-TERM CURRENT USE OF INSULIN: ICD-10-CM

## 2023-05-17 DIAGNOSIS — Z79.4 CONTROLLED TYPE 2 DIABETES MELLITUS WITH HYPOGLYCEMIA, WITH LONG-TERM CURRENT USE OF INSULIN: ICD-10-CM

## 2023-05-17 RX ORDER — INSULIN GLARGINE 100 [IU]/ML
80 INJECTION, SOLUTION SUBCUTANEOUS NIGHTLY
Qty: 72 ML | Refills: 1 | Status: SHIPPED | OUTPATIENT
Start: 2023-05-17

## 2023-05-17 NOTE — TELEPHONE ENCOUNTER
Patient needs refill on the lantus said he has no refills and thought he would have enough to make it to his appointment.

## 2023-05-18 ENCOUNTER — PRIOR AUTHORIZATION (OUTPATIENT)
Dept: ENDOCRINOLOGY | Facility: CLINIC | Age: 48
End: 2023-05-18
Payer: COMMERCIAL

## 2023-05-18 NOTE — TELEPHONE ENCOUNTER
Raul Welch (Martinez: [id number])  Rx #: [id number]  Lantus SoloStar 100UNIT/ML pen-injectors     Form  [facility] [place] Medicaid ePA Form 2017 NCPDP  Created  24 hours ago  Sent to Plan  14 minutes ago  Plan Response  13 minutes ago  Submit Clinical Questions  12 minutes ago  Determination  Favorable  12 minutes ago  COVID-19 Clinical Trial  VIEW OPPORTUNITY  A clinical trial for COVID-19 is looking for volunteers in your area. Ensure this clinical trial is representative of communities with the highest risk of COVID-19.    Message from plan: The request has been approved. The authorization is effective for a maximum of 12 fills from 05/18/2023 to 05/17/2024, as long as the member is enrolled in their current health plan

## 2023-05-30 ENCOUNTER — OFFICE VISIT (OUTPATIENT)
Dept: ENDOCRINOLOGY | Facility: CLINIC | Age: 48
End: 2023-05-30

## 2023-05-30 VITALS
WEIGHT: 315 LBS | HEIGHT: 76 IN | SYSTOLIC BLOOD PRESSURE: 132 MMHG | HEART RATE: 72 BPM | OXYGEN SATURATION: 97 % | BODY MASS INDEX: 38.36 KG/M2 | DIASTOLIC BLOOD PRESSURE: 80 MMHG

## 2023-05-30 DIAGNOSIS — E11.65 CONTROLLED TYPE 2 DIABETES MELLITUS WITH HYPERGLYCEMIA, WITH LONG-TERM CURRENT USE OF INSULIN: Primary | Chronic | ICD-10-CM

## 2023-05-30 DIAGNOSIS — Z79.4 CONTROLLED TYPE 2 DIABETES MELLITUS WITH HYPERGLYCEMIA, WITH LONG-TERM CURRENT USE OF INSULIN: Primary | Chronic | ICD-10-CM

## 2023-05-30 DIAGNOSIS — E55.9 VITAMIN D DEFICIENCY: Chronic | ICD-10-CM

## 2023-05-30 LAB
EXPIRATION DATE: ABNORMAL
EXPIRATION DATE: NORMAL
GLUCOSE BLDC GLUCOMTR-MCNC: 205 MG/DL (ref 70–130)
HBA1C MFR BLD: 7.3 %
Lab: ABNORMAL
Lab: NORMAL

## 2023-05-30 RX ORDER — INSULIN GLARGINE 100 [IU]/ML
80 INJECTION, SOLUTION SUBCUTANEOUS NIGHTLY
Qty: 72 ML | Refills: 1 | Status: SHIPPED | OUTPATIENT
Start: 2023-05-30

## 2023-05-30 RX ORDER — DULAGLUTIDE 4.5 MG/.5ML
4.5 INJECTION, SOLUTION SUBCUTANEOUS WEEKLY
Qty: 6 ML | Refills: 1 | Status: SHIPPED | OUTPATIENT
Start: 2023-05-30

## 2023-05-30 RX ORDER — ERGOCALCIFEROL 1.25 MG/1
50000 CAPSULE ORAL WEEKLY
Qty: 13 CAPSULE | Refills: 1 | Status: SHIPPED | OUTPATIENT
Start: 2023-05-30

## 2023-05-30 RX ORDER — CARVEDILOL 12.5 MG/1
1 TABLET ORAL EVERY 12 HOURS SCHEDULED
COMMUNITY
Start: 2023-05-07

## 2023-05-30 NOTE — PROGRESS NOTES
Chief Complaint  F/u for Diabetes Mellitus.    HPI   Raul Welch is a 47 y.o. male who is here today for f/u of Diabetes Mellitus type 2. The initial diagnosis of diabetes was made in his 20s.     Reports BG going up at night.     On weekly vit d supplement.    Diabetic complications: peripheral neuropathy  Eye exam current (within one year): [facility]    Current diabetic medications include:  Metformin 1000mg BID  Invokana 300 mg daily  Glybride 5 mg daily AC  Trulicity 3 mg weekly  Lantus 70u qhs    Statin: lipitor 40, changed from zocor 20 6/2020, vascepa started 6/2020    Past medications: ozempic 0.5 mg (changed to trulicity due to insurance preference), basaglar, Steglatro, glyburide    Diabetic Monitoring  -   dexcom downloaded and reviewed (5/17/23-5/30/23): time in range 63%, fasting BG ~100 (after being elevated during the night), post prandial hyperglycemia in the 200s, rare hypoglyecmia    Freestyle Jose was expensive.     The following portions of the patient's history were reviewed and updated by me as appropriate: allergies, current medications, past family history, past social history, past surgical history and problem list.    Past Medical History:   Diagnosis Date   • Arthritis    • Asthma     SPOUSE REPORTS ISSUES AS A CHILD   • Back pain    • Bell's palsy    • Blood in stool    • Depression    • Diabetes    • Diarrhea     WITH ABDOMINAL CRAMPING   • Diverticulitis    • Elevated cholesterol    • GERD (gastroesophageal reflux disease)    • Headache    • High cholesterol    • History of being tatooed    • History of bronchitis    • History of fracture     HX OF FOOT (SPOUSE UNSURE LATERALITY)   • History of gout    • Hypertension    • Myocardial infarction 1997   • Sleep apnea     USES BIPAP HS - TO BRING IN THE DOS   • Tattoos    • Type 2 diabetes mellitus    • Vitamin D deficiency 5/30/2023   • Wears glasses        Medications    Current Outpatient Medications:   •   atorvastatin (LIPITOR) 40 MG tablet, TAKE 1 TABLET BY MOUTH EVERY DAY, Disp: 90 tablet, Rfl: 1  •  B-D UF III MINI PEN NEEDLES 31G X 5 MM misc, USE ONCE DAILY WITH INSULIN INJECTIONS AS DIRECTED, Disp: , Rfl: 11  •  Blood Glucose Monitoring Suppl device, Use as directed to check blood sugar, Disp: 1 each, Rfl: 0  •  carvedilol (COREG) 12.5 MG tablet, Take 1 tablet by mouth Every 12 (Twelve) Hours., Disp: , Rfl:   •  cetirizine (ZyrTEC) 10 MG tablet, Take 12 mg by mouth Daily., Disp: , Rfl:   •  citalopram (CeleXA) 20 MG tablet, Take 1 tablet by mouth Daily., Disp: , Rfl:   •  Continuous Blood Gluc  (Dexcom G6 ) device, 1 each Continuous., Disp: 1 each, Rfl: 0  •  Continuous Blood Gluc Sensor (Dexcom G6 Sensor), Every 10 (Ten) Days., Disp: 3 each, Rfl: 11  •  Continuous Blood Gluc Transmit (Dexcom G6 Transmitter) misc, 1 each Every 3 (Three) Months., Disp: 1 each, Rfl: 3  •  glucose blood (OneTouch Ultra) test strip, Test Three times daily, Disp: 100 each, Rfl: 3  •  hydrochlorothiazide (HYDRODIURIL) 25 MG tablet, Take 1 tablet by mouth Daily., Disp: , Rfl:   •  icosapent ethyl (VASCEPA) 1 g capsule capsule, Take 2 g by mouth 2 (Two) Times a Day With Meals., Disp: 360 capsule, Rfl: 1  •  Insulin Pen Needle 32G X 6 MM misc, Use daily with insulin, Disp: 100 each, Rfl: 11  •  Invokana 300 MG tablet tablet, TAKE 1 TABLET BY MOUTH EVERY DAY, Disp: 90 tablet, Rfl: 1  •  Lantus SoloStar 100 UNIT/ML injection pen, Inject 80 Units under the skin into the appropriate area as directed Every Night., Disp: 72 mL, Rfl: 1  •  lisinopril (PRINIVIL,ZESTRIL) 40 MG tablet, Take 1 tablet by mouth Daily., Disp: , Rfl:   •  meclizine (ANTIVERT) 25 MG tablet, Take 1 tablet by mouth 3 (Three) Times a Day As Needed for Dizziness., Disp: 20 tablet, Rfl: 2  •  metFORMIN (GLUCOPHAGE) 1000 MG tablet, TAKE 1 TABLET BY MOUTH TWO TIMES A DAY WITH MEALS, Disp: 180 tablet, Rfl: 1  •  nystatin-triamcinolone (MYCOLOG) 241588-2.1  "UNIT/GM-% ointment, Apply to affected area 2 times daily, Disp: 30 g, Rfl: 1  •  Omega 3 1200 MG capsule, Take 1,000 mg by mouth 2 (Two) Times a Day., Disp: , Rfl:   •  ondansetron ODT (ZOFRAN-ODT) 4 MG disintegrating tablet, Place 1 tablet on the tongue Every 8 (Eight) Hours As Needed for Nausea or Vomiting., Disp: 12 tablet, Rfl: 0  •  pantoprazole (PROTONIX) 20 MG EC tablet, TAKE 1 TABLET BY MOUTH EVERY DAY, Disp: 30 tablet, Rfl: 0  •  vitamin D (ERGOCALCIFEROL) 1.25 MG (05771 UT) capsule capsule, Take 1 capsule by mouth 1 (One) Time Per Week., Disp: 13 capsule, Rfl: 1  •  Dulaglutide (Trulicity) 4.5 MG/0.5ML solution pen-injector, Inject 0.5 mL under the skin into the appropriate area as directed 1 (One) Time Per Week. Dose increase, Disp: 6 mL, Rfl: 1    Review of Systems  Review of Systems   All other systems reviewed and are negative.       Physical Exam    /80   Pulse 72   Ht 193 cm (76\")   Wt (!) 168 kg (371 lb)   SpO2 97%   BMI 45.16 kg/m² Body mass index is 45.16 kg/m².  Physical Exam   Constitutional: He is oriented to person, place, and time. He appears well-developed. No distress.   HENT:   Head: Normocephalic.   Right Ear: External ear normal.   Left Ear: External ear normal.   Nose: Nose normal.   Eyes: Conjunctivae are normal. Right eye exhibits no discharge. Left eye exhibits no discharge. No scleral icterus.   Neck: No JVD present. No tracheal deviation present. No thyromegaly present.   Cardiovascular: Normal rate, regular rhythm and normal heart sounds.   No murmur heard.  Pulmonary/Chest: Effort normal and breath sounds normal. No respiratory distress. He has no wheezes.   Musculoskeletal: No tenderness.   Neurological: He is alert and oriented to person, place, and time.   Skin: Skin is warm and dry. No rash noted. He is not diaphoretic. No erythema.   Psychiatric: His behavior is normal. Judgment and thought content normal.       Labs and Imaging   Lab Results   Component Value " Date    HGBA1C 7.3 05/30/2023    HGBA1C 6.9 02/14/2023    HGBA1C 7.9 11/01/2022     Office Visit on 05/30/2023   Component Date Value Ref Range Status   • Hemoglobin A1C 05/30/2023 7.3  % Final   • Lot Number 05/30/2023 [id number]   Final   • Expiration Date 05/30/2023 01-   Final   • Glucose 05/30/2023 205 (A)  70 - 130 mg/dL Final   • Lot Number 05/30/2023 [id number]   Final   • Expiration Date 05/30/2023 11-   Final       Assessment / Plan   Diagnoses and all orders for this visit:    1. Controlled type 2 diabetes mellitus with hyperglycemia, with long-term current use of insulin (Primary)  -     POC Glycosylated Hemoglobin (Hb A1C)  -     POC Glucose, Blood  -     Dulaglutide (Trulicity) 4.5 MG/0.5ML solution pen-injector; Inject 0.5 mL under the skin into the appropriate area as directed 1 (One) Time Per Week. Dose increase  Dispense: 6 mL; Refill: 1  -     Lantus SoloStar 100 UNIT/ML injection pen; Inject 80 Units under the skin into the appropriate area as directed Every Night.  Dispense: 72 mL; Refill: 1    2. Vitamin D deficiency  -     vitamin D (ERGOCALCIFEROL) 1.25 MG (02561 UT) capsule capsule; Take 1 capsule by mouth 1 (One) Time Per Week.  Dispense: 13 capsule; Refill: 1        Diabetes Mellitus 2 is under inadequate control  -A1c 7.3, up from 6.9 last visit  -63% time in range, down from 88% last visit  -some day time hypoglycemia  -increase trulicity to 4.5 mg weekly  -dc glyburide  -increase lantus to 80 u daily  -continue Invokana 300 mg daily, metformin 1000 mg BID  -continue to work on diet  -may need prandial insulin in the future  -eye exam utd  -Labs updated utd  -Foot exam updated 7/2022    Vit d deficiency  -improving  -continue weekly supplement    There are no Patient Instructions on file for this visit.    Follow up: Return in about 4 months (around 9/30/2023).      Jo-Ann Clark PA-C

## 2023-07-18 ENCOUNTER — HOSPITAL ENCOUNTER (EMERGENCY)
Facility: HOSPITAL | Age: 48
Discharge: HOME | End: 2023-07-18
Attending: EMERGENCY MEDICINE | Admitting: EMERGENCY MEDICINE
Payer: COMMERCIAL

## 2023-07-18 ENCOUNTER — APPOINTMENT (OUTPATIENT)
Dept: GENERAL RADIOLOGY | Facility: HOSPITAL | Age: 48
End: 2023-07-18
Payer: COMMERCIAL

## 2023-07-18 VITALS
HEART RATE: 88 BPM | BODY MASS INDEX: 38.36 KG/M2 | OXYGEN SATURATION: 97 % | TEMPERATURE: 98.1 F | SYSTOLIC BLOOD PRESSURE: 127 MMHG | RESPIRATION RATE: 20 BRPM | HEIGHT: 76 IN | WEIGHT: 315 LBS | DIASTOLIC BLOOD PRESSURE: 87 MMHG

## 2023-07-18 DIAGNOSIS — E11.65 POORLY CONTROLLED DIABETES MELLITUS: ICD-10-CM

## 2023-07-18 DIAGNOSIS — E11.628 DIABETIC FOOT INFECTION: ICD-10-CM

## 2023-07-18 DIAGNOSIS — L08.9 TOE INFECTION: Primary | ICD-10-CM

## 2023-07-18 DIAGNOSIS — L08.9 DIABETIC FOOT INFECTION: ICD-10-CM

## 2023-07-18 LAB
ALBUMIN SERPL-MCNC: 4.2 G/DL (ref 3.5–5.2)
ALBUMIN/GLOB SERPL: 1.4 G/DL
ALP SERPL-CCNC: 89 U/L (ref 39–117)
ALT SERPL W P-5'-P-CCNC: 43 U/L (ref 1–41)
ANION GAP SERPL CALCULATED.3IONS-SCNC: 15.3 MMOL/L (ref 5–15)
AST SERPL-CCNC: 29 U/L (ref 1–40)
BASOPHILS # BLD AUTO: 0.04 10*3/MM3 (ref 0–0.2)
BASOPHILS NFR BLD AUTO: 0.5 % (ref 0–1.5)
BILIRUB SERPL-MCNC: 0.5 MG/DL (ref 0–1.2)
BILIRUB UR QL STRIP: NEGATIVE
BUN SERPL-MCNC: 16 MG/DL (ref 6–20)
BUN/CREAT SERPL: 19.5 (ref 7–25)
CALCIUM SPEC-SCNC: 9.7 MG/DL (ref 8.6–10.5)
CHLORIDE SERPL-SCNC: 103 MMOL/L (ref 98–107)
CLARITY UR: CLEAR
CO2 SERPL-SCNC: 21.7 MMOL/L (ref 22–29)
COLOR UR: YELLOW
CREAT SERPL-MCNC: 0.82 MG/DL (ref 0.76–1.27)
DEPRECATED RDW RBC AUTO: 42.5 FL (ref 37–54)
EGFRCR SERPLBLD CKD-EPI 2021: 107.7 ML/MIN/1.73
EOSINOPHIL # BLD AUTO: 0.37 10*3/MM3 (ref 0–0.4)
EOSINOPHIL NFR BLD AUTO: 4.7 % (ref 0.3–6.2)
ERYTHROCYTE [DISTWIDTH] IN BLOOD BY AUTOMATED COUNT: 13.8 % (ref 12.3–15.4)
GLOBULIN UR ELPH-MCNC: 3.1 GM/DL
GLUCOSE SERPL-MCNC: 220 MG/DL (ref 65–99)
GLUCOSE UR STRIP-MCNC: ABNORMAL MG/DL
HBA1C MFR BLD: 8.7 % (ref 4.8–5.6)
HCT VFR BLD AUTO: 49.7 % (ref 37.5–51)
HGB BLD-MCNC: 17.1 G/DL (ref 13–17.7)
HGB UR QL STRIP.AUTO: NEGATIVE
IMM GRANULOCYTES # BLD AUTO: 0.05 10*3/MM3 (ref 0–0.05)
IMM GRANULOCYTES NFR BLD AUTO: 0.6 % (ref 0–0.5)
KETONES UR QL STRIP: NEGATIVE
LEUKOCYTE ESTERASE UR QL STRIP.AUTO: NEGATIVE
LYMPHOCYTES # BLD AUTO: 2.02 10*3/MM3 (ref 0.7–3.1)
LYMPHOCYTES NFR BLD AUTO: 25.9 % (ref 19.6–45.3)
MCH RBC QN AUTO: 29.5 PG (ref 26.6–33)
MCHC RBC AUTO-ENTMCNC: 34.4 G/DL (ref 31.5–35.7)
MCV RBC AUTO: 85.7 FL (ref 79–97)
MONOCYTES # BLD AUTO: 0.72 10*3/MM3 (ref 0.1–0.9)
MONOCYTES NFR BLD AUTO: 9.2 % (ref 5–12)
NEUTROPHILS NFR BLD AUTO: 4.61 10*3/MM3 (ref 1.7–7)
NEUTROPHILS NFR BLD AUTO: 59.1 % (ref 42.7–76)
NITRITE UR QL STRIP: NEGATIVE
NRBC BLD AUTO-RTO: 0 /100 WBC (ref 0–0.2)
PH UR STRIP.AUTO: 5.5 [PH] (ref 5–8)
PLATELET # BLD AUTO: 205 10*3/MM3 (ref 140–450)
PMV BLD AUTO: 10.3 FL (ref 6–12)
POTASSIUM SERPL-SCNC: 4.2 MMOL/L (ref 3.5–5.2)
PROT SERPL-MCNC: 7.3 G/DL (ref 6–8.5)
PROT UR QL STRIP: NEGATIVE
RBC # BLD AUTO: 5.8 10*6/MM3 (ref 4.14–5.8)
SODIUM SERPL-SCNC: 140 MMOL/L (ref 136–145)
SP GR UR STRIP: 1.03 (ref 1–1.03)
UROBILINOGEN UR QL STRIP: ABNORMAL
WBC NRBC COR # BLD: 7.81 10*3/MM3 (ref 3.4–10.8)

## 2023-07-18 PROCEDURE — 73630 X-RAY EXAM OF FOOT: CPT

## 2023-07-18 PROCEDURE — 81003 URINALYSIS AUTO W/O SCOPE: CPT | Performed by: PHYSICIAN ASSISTANT

## 2023-07-18 PROCEDURE — 99284 EMERGENCY DEPT VISIT MOD MDM: CPT

## 2023-07-18 PROCEDURE — 83036 HEMOGLOBIN GLYCOSYLATED A1C: CPT | Performed by: PHYSICIAN ASSISTANT

## 2023-07-18 PROCEDURE — 85025 COMPLETE CBC W/AUTO DIFF WBC: CPT | Performed by: PHYSICIAN ASSISTANT

## 2023-07-18 PROCEDURE — 80053 COMPREHEN METABOLIC PANEL: CPT | Performed by: PHYSICIAN ASSISTANT

## 2023-07-18 RX ORDER — SULFAMETHOXAZOLE AND TRIMETHOPRIM 800; 160 MG/1; MG/1
1 TABLET ORAL 2 TIMES DAILY
Qty: 14 TABLET | Refills: 0 | Status: SHIPPED | OUTPATIENT
Start: 2023-07-19 | End: 2023-07-26

## 2023-07-18 RX ORDER — SULFAMETHOXAZOLE AND TRIMETHOPRIM 800; 160 MG/1; MG/1
1 TABLET ORAL ONCE
Status: COMPLETED | OUTPATIENT
Start: 2023-07-18 | End: 2023-07-18

## 2023-07-18 RX ADMIN — SULFAMETHOXAZOLE AND TRIMETHOPRIM 1 TABLET: 800; 160 TABLET ORAL at 18:34

## 2023-07-18 NOTE — ED PROVIDER NOTES
Subjective   History of Present Illness  27-year-old male presents with a left second toe infection and discoloration, he states that he used a Dremel tool to take a callus off of his left toe, he did this a few weeks ago, he noticed a few days ago a wound on the inside of his left second toe and the bottom is now black he reports.  He does have a history of diabetes and his readings have been high lately.  Seen by his primary care physician who sent him to the emergency department for further evaluation.  Difficult past medical history for hypertension, hypercholesterol, coronary artery disease, diabetes, obesity.    History provided by:  Patient   used: No      Review of Systems   Musculoskeletal:         Left second toe infection   All other systems reviewed and are negative.    Past Medical History:   Diagnosis Date    Arthritis     Asthma     SPOUSE REPORTS ISSUES AS A CHILD    Back pain     Bell's palsy     Blood in stool     Depression     Diabetes     Diarrhea     WITH ABDOMINAL CRAMPING    Diverticulitis     Elevated cholesterol     GERD (gastroesophageal reflux disease)     Headache     High cholesterol     History of being tatooed     History of bronchitis     History of fracture     HX OF FOOT (SPOUSE UNSURE LATERALITY)    History of gout     Hypertension     Myocardial infarction 1997    Sleep apnea     USES BIPAP HS - TO BRING IN THE DOS    Tattoos     Type 2 diabetes mellitus     Vitamin D deficiency 5/30/2023    Wears glasses        No Known Allergies    Past Surgical History:   Procedure Laterality Date    APPENDECTOMY  1990    CARDIAC CATHETERIZATION      CARDIAC CATHETERIZATION N/A 09/30/2017    Procedure: Coronary angiography;  Surgeon: Zander Bobo MD;  Location: [facility] INVASIVE LOCATION;  Service:     COLONOSCOPY N/A 06/25/2018    Procedure: COLONOSCOPY with hot snare polypectomy,  cold snare polypectomy, cold biopsy polypectomies, and biopsies;  Surgeon: Sandro QUINONES  MD Tariq;  Location: [facility] ENDOSCOPY;  Service: Gastroenterology    KNEE SURGERY Left     removed infection/mass in knee    OTHER SURGICAL HISTORY      SPOUSE REPORTS GROWTH REMOVED FROM THROAT WHEN PATIENT WAS A YOUNG CHILD       Family History   Problem Relation Age of Onset    Diabetes Mother     Obesity Mother     Alcohol abuse Father     Diabetes Father     Heart disease Father     Colon cancer Neg Hx     Cirrhosis Neg Hx     Liver cancer Neg Hx     Liver disease Neg Hx        Social History     Socioeconomic History    Marital status:    Tobacco Use    Smoking status: Former     Packs/day: 0.25     Years: 0.50     Pack years: 0.13     Types: Cigarettes     Quit date: 1990     Years since quittin.5    Smokeless tobacco: Never    Tobacco comments:     Smoked a [employer] reviews school   Vaping Use    Vaping Use: Never used   Substance and Sexual Activity    Alcohol use: Never    Drug use: Never    Sexual activity: Not Currently     Partners: Female           Objective   Physical Exam  Vitals and nursing note reviewed.   Constitutional:       Appearance: He is well-developed.   HENT:      Head: Normocephalic and atraumatic.      Mouth/Throat:      Mouth: Mucous membranes are moist.   Eyes:      Extraocular Movements: Extraocular movements intact.   Cardiovascular:      Rate and Rhythm: Normal rate and regular rhythm.   Pulmonary:      Effort: Pulmonary effort is normal.      Breath sounds: Normal breath sounds.   Abdominal:      Palpations: Abdomen is soft.   Musculoskeletal:      Cervical back: Normal range of motion.   Neurological:      Mental Status: He is alert and oriented to person, place, and time.   Psychiatric:         Behavior: Behavior normal.         Thought Content: Thought content normal.         Judgment: Judgment normal.     Lab Results (last 24 hours)       Procedure Component Value Units Date/Time    CBC Auto Differential [[id number]]  (Abnormal) Collected: 23 4087     Specimen: Blood Updated: 07/18/23 1706     WBC 7.81 10*3/mm3      RBC 5.80 10*6/mm3      Hemoglobin 17.1 g/dL      Hematocrit 49.7 %      MCV 85.7 fL      MCH 29.5 pg      MCHC 34.4 g/dL      RDW 13.8 %      RDW-SD 42.5 fl      MPV 10.3 fL      Platelets 205 10*3/mm3      Neutrophil % 59.1 %      Lymphocyte % 25.9 %      Monocyte % 9.2 %      Eosinophil % 4.7 %      Basophil % 0.5 %      Immature Grans % 0.6 %      Neutrophils, Absolute 4.61 10*3/mm3      Lymphocytes, Absolute 2.02 10*3/mm3      Monocytes, Absolute 0.72 10*3/mm3      Eosinophils, Absolute 0.37 10*3/mm3      Basophils, Absolute 0.04 10*3/mm3      Immature Grans, Absolute 0.05 10*3/mm3      nRBC 0.0 /100 WBC     Comprehensive Metabolic Panel [[id number]]  (Abnormal) Collected: 07/18/23 1658    Specimen: Blood Updated: 07/18/23 1726     Glucose 220 mg/dL      Comment: Glucose >180, Hemoglobin A1C recommended.        BUN 16 mg/dL      Creatinine 0.82 mg/dL      Sodium 140 mmol/L      Potassium 4.2 mmol/L      Comment: Slight hemolysis detected by analyzer. Results may be affected.        Chloride 103 mmol/L      CO2 21.7 mmol/L      Calcium 9.7 mg/dL      Total Protein 7.3 g/dL      Albumin 4.2 g/dL      ALT (SGPT) 43 U/L      AST (SGOT) 29 U/L      Alkaline Phosphatase 89 U/L      Total Bilirubin 0.5 mg/dL      Globulin 3.1 gm/dL      A/G Ratio 1.4 g/dL      BUN/Creatinine Ratio 19.5     Anion Gap 15.3 mmol/L      eGFR 107.7 mL/min/1.73     Narrative:      GFR Normal >60  Chronic Kidney Disease <60  Kidney Failure <15      Hemoglobin A1c [[id number]]  (Abnormal) Collected: 07/18/23 1658    Specimen: Blood Updated: 07/18/23 1718     Hemoglobin A1C 8.70 %     Narrative:      Hemoglobin A1C Ranges:    Increased Risk for Diabetes  5.7% to 6.4%  Diabetes                     >= 6.5%  Diabetic Goal                < 7.0%    Urinalysis With Culture If Indicated - Urine, Clean Catch [[id number]]  (Abnormal) Collected: 07/18/23 1700    Specimen: Urine, Clean Catch  Updated: 07/18/23 1708     Color, UA Yellow     Appearance, UA Clear     pH, UA 5.5     Specific Gravity, UA 1.027     Glucose, UA >=1000 mg/dL (3+)     Ketones, UA Negative     Bilirubin, UA Negative     Blood, UA Negative     Protein, UA Negative     Leuk Esterase, UA Negative     Nitrite, UA Negative     Urobilinogen, UA 0.2 E.U./dL    Narrative:      In absence of clinical symptoms, the presence of pyuria, bacteria, and/or nitrites on the urinalysis result does not correlate with infection.  Urine microscopic not indicated.           XR Foot 3+ View Left   ED Interpretation   No osteomyelitis           Procedures           ED Course  ED Course as of 07/18/23 1828 Tue Jul 18, 2023 1807 Discussed the case with , patient does not have a white count, no osteomyelitis on x-ray, she can take care of wound care and recommend the patient come to the office tomorrow at 9 AM. [CS]      ED Course User Index  [CS] Santo Russell Jr., [place]                                           Medical Decision Making  47-year-old male with diabetes presents with a left second toe infection, differential would include foot infection, toe infection, osteomyelitis, necrosis.  On evaluation he had a discolored left second toe with necrosis of the skin on the bottom of the toe.  An IV was established labs were drawn, and a x-ray was performed.  I interpreted all labs and discussed with the patient plan with the bedside as well as interpretation of the foot x-ray.  I reviewed and summarized previous medical records.  His labs are unremarkable with exception of a high hemoglobin A1c.  I discussed the results in consultation with  podiatry, she agreed to see the patient tomorrow at 9 AM, he received a dose of oral antibiotics and was discharged on antibiotics and will follow-up for further care with podiatry he is stable for discharge    Problems Addressed:  Diabetic foot infection: complicated acute illness or  injury  Poorly controlled diabetes mellitus: complicated acute illness or injury  Toe infection: complicated acute illness or injury    Amount and/or Complexity of Data Reviewed  Labs:  Decision-making details documented in ED Course.  Radiology: ordered and independent interpretation performed. Decision-making details documented in ED Course.    Risk  Prescription drug management.        Final diagnoses:   Toe infection   Diabetic foot infection   Poorly controlled diabetes mellitus       ED Disposition  ED Disposition       ED Disposition   Discharge    Condition   Stable    Comment   --               Shante Leslie, ALIYAH  [address] [place] [address] [phone]    In 1 day  office visit at 9am         Medication List        New Prescriptions      sulfamethoxazole-trimethoprim 800-160 MG per tablet  Commonly known as: BACTRIM DS,SEPTRA DS  Take 1 tablet by mouth 2 (Two) Times a Day for 7 days.  Start taking on: July 19, 2023               Where to Get Your Medications        These medications were sent to [id number] - [place], [place] - [address] - [phone]  - [phone] FX  [address] [place] [address] [place] [address]      Phone: [phone]   sulfamethoxazole-trimethoprim 800-160 MG per tablet            Santo Russell Jr., PA-C  07/18/23 9968     Previous Accession (Optional): [id number] Previous Accession (Optional): [id number]

## 2023-09-14 ENCOUNTER — PREP FOR SURGERY (OUTPATIENT)
Dept: OTHER | Facility: HOSPITAL | Age: 48
End: 2023-09-14
Payer: COMMERCIAL

## 2023-09-14 DIAGNOSIS — M86.9 OSTEOMYELITIS OF SECOND TOE OF LEFT FOOT: Primary | ICD-10-CM

## 2023-09-14 RX ORDER — CEFAZOLIN SODIUM 2 G/50ML
2000 SOLUTION INTRAVENOUS ONCE
OUTPATIENT
Start: 2023-09-14 | End: 2023-09-14

## 2023-09-15 PROBLEM — M86.9 OSTEOMYELITIS OF SECOND TOE OF LEFT FOOT: Status: ACTIVE | Noted: 2023-09-15

## 2023-09-15 NOTE — PRE-PROCEDURE INSTRUCTIONS
PAT PASS GIVEN/REVIEWED WITH PT.  VERBALIZED UNDERSTANDING OF THE FOLLOWING:  DO NOT EAT, DRINK, SMOKE, USE SMOKELESS TOBACCO OR CHEW GUM AFTER MIDNIGHT THE NIGHT BEFORE SURGERY.  THIS ALSO INCLUDES HARD CANDIES AND MINTS.    DO NOT SHAVE THE AREA TO BE OPERATED ON AT LEAST 48 HOURS PRIOR TO THE PROCEDURE.  DO NOT WEAR MAKE UP OR NAIL POLISH.  DO NOT LEAVE IN ANY PIERCING OR WEAR JEWELRY THE DAY OF SURGERY.      DO NOT USE ADHESIVES IF YOU WEAR DENTURES.    DO NOT WEAR EYE CONTACTS; BRING IN YOUR GLASSES.    ONLY TAKE MEDICATION THE MORNING OF YOUR PROCEDURE IF INSTRUCTED BY YOUR SURGEON WITH ENOUGH WATER TO SWALLOW THE MEDICATION.  IF YOUR SURGEON DID NOT SPECIFY WHICH MEDICATIONS TO TAKE, YOU WILL NEED TO CALL THEIR OFFICE FOR FURTHER INSTRUCTIONS AND DO AS THEY INSTRUCT.    LEAVE ANYTHING YOU CONSIDER VALUABLE AT HOME.    YOU WILL NEED TO ARRANGE FOR SOMEONE TO DRIVE YOU HOME AFTER SURGERY.  IT IS RECOMMENDED THAT YOU DO NOT DRIVE, WORK, DRINK ALCOHOL OR MAKE MAJOR DECISIONS FOR AT LEAST 24 HOURS AFTER YOUR PROCEDURE IS COMPLETE.      THE DAY OF YOUR PROCEDURE, BRING IN THE FOLLOWING IF APPLICABLE:   PICTURE ID AND INSURANCE/MEDICARE OR MEDICAID CARDS/ANY CO-PAY THAT MAY BE DUE   COPY OF ADVANCED DIRECTIVE/LIVING WILL/POWER OR    CPAP/BIPAP/INHALERS   SKIN PREP SHEET   YOUR PREADMISSION TESTING PASS (IF NOT A PHONE HISTORY)    Medication instructions given to pt by RN per anesthesia protocol.  Pt referred back to surgeon for further instructions if he/she is on any blood thinners.

## 2023-09-18 ENCOUNTER — ANESTHESIA EVENT (OUTPATIENT)
Dept: PERIOP | Facility: HOSPITAL | Age: 48
End: 2023-09-18
Payer: COMMERCIAL

## 2023-09-18 NOTE — ANESTHESIA PREPROCEDURE EVALUATION
Anesthesia Evaluation     Patient summary reviewed and Nursing notes reviewed   no history of anesthetic complications:   NPO Solid Status: > 8 hours  NPO Liquid Status: > 6 hours           Airway   Mallampati: II  TM distance: >3 FB  Neck ROM: full  Possible difficult intubation, Difficult intubation highly probable and Large neck circumference  Dental - normal exam     Pulmonary    (+) a smoker Current, COPD, asthma,shortness of breath, sleep apnea, decreased breath sounds  Cardiovascular   Exercise tolerance: good (4-7 METS)    ECG reviewed  Patient on routine beta blocker  Rhythm: regular  Rate: normal    (+) hypertension, past MI (1996)  >12 months, angina at rest, FELDMAN, PVD, hyperlipidemia  CAD:  INC RISK OBESE, GONZÁLEZ, MI DMi.      Neuro/Psych  (+) neuromuscular disease, headaches, weakness, numbness, psychiatric history Anxiety and Depression  GI/Hepatic/Renal/Endo    (+) obesity, morbid obesity, GERD, GI bleeding , liver disease fatty liver disease, diabetes mellitus type 2 poorly controlled    Musculoskeletal     (+) arthralgias, back pain, chronic pain, myalgias  Abdominal   (+) obese   Substance History      OB/GYN          Other   arthritis,     ROS/Med Hx Other: H/o non compliance  Labs reviewed   10/17 echo · he study is technically difficult for diagnosis. The quality of the study is limited due to poor acoustic windows related to patient body habitus.  · Left ventricular systolic function is normal. Estimated EF = 60%.  · Left ventricular wall thickness is consistent with concentric hypertrophy.  · There was no significant valvular abnormality.  · Saline test results are negative for a PFO.  9/17 cath · Angiographically near normal coronary arteries  · Normal left ventricular systolic function  · No significant left-sided valvular abnormalities appreciated                      Anesthesia Plan    ASA 4     MAC     (Risks and benefits discussed including risk of aspiration, recall and dental damage.  Discussed risks with pt., pt extremely high intraop and postop risk for cv, resp, and neuro events, All patient questions answered.    Will continue with plan of care.)  intravenous induction     Anesthetic plan, risks, benefits, and alternatives have been provided, discussed and informed consent has been obtained with: patient.  Pre-procedure education provided

## 2023-09-19 ENCOUNTER — ANESTHESIA (OUTPATIENT)
Dept: PERIOP | Facility: HOSPITAL | Age: 48
End: 2023-09-19
Payer: COMMERCIAL

## 2023-09-19 ENCOUNTER — HOSPITAL ENCOUNTER (OUTPATIENT)
Facility: HOSPITAL | Age: 48
Setting detail: HOSPITAL OUTPATIENT SURGERY
Discharge: HOME | End: 2023-09-19
Attending: PODIATRIST | Admitting: PODIATRIST
Payer: COMMERCIAL

## 2023-09-19 VITALS
OXYGEN SATURATION: 97 % | WEIGHT: 315 LBS | TEMPERATURE: 98.1 F | DIASTOLIC BLOOD PRESSURE: 48 MMHG | BODY MASS INDEX: 38.36 KG/M2 | SYSTOLIC BLOOD PRESSURE: 119 MMHG | HEART RATE: 71 BPM | HEIGHT: 76 IN | RESPIRATION RATE: 18 BRPM

## 2023-09-19 DIAGNOSIS — M86.9 OSTEOMYELITIS OF SECOND TOE OF LEFT FOOT: ICD-10-CM

## 2023-09-19 LAB — GLUCOSE BLDC GLUCOMTR-MCNC: 143 MG/DL (ref 70–130)

## 2023-09-19 PROCEDURE — 25010000002 MIDAZOLAM PER 1MG: Performed by: NURSE ANESTHETIST, CERTIFIED REGISTERED

## 2023-09-19 PROCEDURE — 82948 REAGENT STRIP/BLOOD GLUCOSE: CPT

## 2023-09-19 PROCEDURE — 25010000002 DEXAMETHASONE PER 1 MG: Performed by: NURSE ANESTHETIST, CERTIFIED REGISTERED

## 2023-09-19 PROCEDURE — 25010000002 FENTANYL CITRATE (PF) 50 MCG/ML SOLUTION: Performed by: NURSE ANESTHETIST, CERTIFIED REGISTERED

## 2023-09-19 PROCEDURE — 25010000002 BUPIVACAINE 0.5 % SOLUTION: Performed by: PODIATRIST

## 2023-09-19 PROCEDURE — 25010000002 CEFAZOLIN PER 500 MG: Performed by: PODIATRIST

## 2023-09-19 PROCEDURE — 25010000002 METOCLOPRAMIDE PER 10 MG: Performed by: NURSE ANESTHETIST, CERTIFIED REGISTERED

## 2023-09-19 PROCEDURE — 25010000002 VANCOMYCIN 1 G RECONSTITUTED SOLUTION: Performed by: PODIATRIST

## 2023-09-19 PROCEDURE — 25010000002 ONDANSETRON PER 1 MG: Performed by: NURSE ANESTHETIST, CERTIFIED REGISTERED

## 2023-09-19 PROCEDURE — 25010000002 PROPOFOL 10 MG/ML EMULSION: Performed by: NURSE ANESTHETIST, CERTIFIED REGISTERED

## 2023-09-19 PROCEDURE — 0 LIDOCAINE 1 % SOLUTION: Performed by: PODIATRIST

## 2023-09-19 RX ORDER — SODIUM CHLORIDE 0.9 % (FLUSH) 0.9 %
10 SYRINGE (ML) INJECTION AS NEEDED
Status: DISCONTINUED | OUTPATIENT
Start: 2023-09-19 | End: 2023-09-19 | Stop reason: HOSPADM

## 2023-09-19 RX ORDER — METOCLOPRAMIDE HYDROCHLORIDE 5 MG/ML
INJECTION INTRAMUSCULAR; INTRAVENOUS AS NEEDED
Status: DISCONTINUED | OUTPATIENT
Start: 2023-09-19 | End: 2023-09-19 | Stop reason: SURG

## 2023-09-19 RX ORDER — CEFAZOLIN SODIUM IN 0.9 % NACL 3 G/100 ML
3000 INTRAVENOUS SOLUTION, PIGGYBACK (ML) INTRAVENOUS ONCE
Status: COMPLETED | OUTPATIENT
Start: 2023-09-19 | End: 2023-09-19

## 2023-09-19 RX ORDER — MAGNESIUM HYDROXIDE 1200 MG/15ML
LIQUID ORAL AS NEEDED
Status: DISCONTINUED | OUTPATIENT
Start: 2023-09-19 | End: 2023-09-19 | Stop reason: HOSPADM

## 2023-09-19 RX ORDER — LIDOCAINE HYDROCHLORIDE 10 MG/ML
INJECTION, SOLUTION INFILTRATION; PERINEURAL AS NEEDED
Status: DISCONTINUED | OUTPATIENT
Start: 2023-09-19 | End: 2023-09-19 | Stop reason: HOSPADM

## 2023-09-19 RX ORDER — SODIUM CHLORIDE, SODIUM LACTATE, POTASSIUM CHLORIDE, CALCIUM CHLORIDE 600; 310; 30; 20 MG/100ML; MG/100ML; MG/100ML; MG/100ML
1000 INJECTION, SOLUTION INTRAVENOUS CONTINUOUS
Status: DISCONTINUED | OUTPATIENT
Start: 2023-09-19 | End: 2023-09-19 | Stop reason: HOSPADM

## 2023-09-19 RX ORDER — MIDAZOLAM HYDROCHLORIDE 2 MG/2ML
INJECTION, SOLUTION INTRAMUSCULAR; INTRAVENOUS AS NEEDED
Status: DISCONTINUED | OUTPATIENT
Start: 2023-09-19 | End: 2023-09-19 | Stop reason: SURG

## 2023-09-19 RX ORDER — OXYCODONE HYDROCHLORIDE AND ACETAMINOPHEN 5; 325 MG/1; MG/1
1 TABLET ORAL EVERY 4 HOURS PRN
Qty: 10 TABLET | Refills: 0 | Status: SHIPPED | OUTPATIENT
Start: 2023-09-19

## 2023-09-19 RX ORDER — KETAMINE HCL IN NACL, ISO-OSM 100MG/10ML
SYRINGE (ML) INJECTION AS NEEDED
Status: DISCONTINUED | OUTPATIENT
Start: 2023-09-19 | End: 2023-09-19 | Stop reason: SURG

## 2023-09-19 RX ORDER — FENTANYL CITRATE 50 UG/ML
INJECTION, SOLUTION INTRAMUSCULAR; INTRAVENOUS AS NEEDED
Status: DISCONTINUED | OUTPATIENT
Start: 2023-09-19 | End: 2023-09-19 | Stop reason: SURG

## 2023-09-19 RX ORDER — DEXAMETHASONE SODIUM PHOSPHATE 4 MG/ML
INJECTION, SOLUTION INTRA-ARTICULAR; INTRALESIONAL; INTRAMUSCULAR; INTRAVENOUS; SOFT TISSUE AS NEEDED
Status: DISCONTINUED | OUTPATIENT
Start: 2023-09-19 | End: 2023-09-19 | Stop reason: SURG

## 2023-09-19 RX ORDER — SODIUM CHLORIDE, SODIUM LACTATE, POTASSIUM CHLORIDE, CALCIUM CHLORIDE 600; 310; 30; 20 MG/100ML; MG/100ML; MG/100ML; MG/100ML
INJECTION, SOLUTION INTRAVENOUS CONTINUOUS PRN
Status: DISCONTINUED | OUTPATIENT
Start: 2023-09-19 | End: 2023-09-19 | Stop reason: SURG

## 2023-09-19 RX ORDER — ONDANSETRON 2 MG/ML
INJECTION INTRAMUSCULAR; INTRAVENOUS AS NEEDED
Status: DISCONTINUED | OUTPATIENT
Start: 2023-09-19 | End: 2023-09-19 | Stop reason: SURG

## 2023-09-19 RX ORDER — VANCOMYCIN HYDROCHLORIDE 1 G/20ML
INJECTION, POWDER, LYOPHILIZED, FOR SOLUTION INTRAVENOUS AS NEEDED
Status: DISCONTINUED | OUTPATIENT
Start: 2023-09-19 | End: 2023-09-19 | Stop reason: HOSPADM

## 2023-09-19 RX ORDER — BUPIVACAINE HYDROCHLORIDE 5 MG/ML
INJECTION, SOLUTION PERINEURAL AS NEEDED
Status: DISCONTINUED | OUTPATIENT
Start: 2023-09-19 | End: 2023-09-19 | Stop reason: HOSPADM

## 2023-09-19 RX ORDER — DOXYCYCLINE HYCLATE 100 MG/1
100 CAPSULE ORAL 2 TIMES DAILY
Qty: 20 CAPSULE | Refills: 0 | Status: SHIPPED | OUTPATIENT
Start: 2023-09-19 | End: 2023-09-29

## 2023-09-19 RX ADMIN — Medication 3000 MG: at 12:42

## 2023-09-19 RX ADMIN — PROPOFOL 140 MCG/KG/MIN: 10 INJECTION, EMULSION INTRAVENOUS at 12:48

## 2023-09-19 RX ADMIN — Medication 10 MG: at 12:50

## 2023-09-19 RX ADMIN — SODIUM CHLORIDE, POTASSIUM CHLORIDE, SODIUM LACTATE AND CALCIUM CHLORIDE 1000 ML: 600; 310; 30; 20 INJECTION, SOLUTION INTRAVENOUS at 11:41

## 2023-09-19 RX ADMIN — DEXAMETHASONE SODIUM PHOSPHATE 8 MG: 4 INJECTION, SOLUTION INTRA-ARTICULAR; INTRALESIONAL; INTRAMUSCULAR; INTRAVENOUS; SOFT TISSUE at 13:05

## 2023-09-19 RX ADMIN — FENTANYL CITRATE 50 MCG: 50 INJECTION, SOLUTION INTRAMUSCULAR; INTRAVENOUS at 12:43

## 2023-09-19 RX ADMIN — ONDANSETRON 4 MG: 2 INJECTION INTRAMUSCULAR; INTRAVENOUS at 13:05

## 2023-09-19 RX ADMIN — FENTANYL CITRATE 50 MCG: 50 INJECTION, SOLUTION INTRAMUSCULAR; INTRAVENOUS at 13:05

## 2023-09-19 RX ADMIN — MIDAZOLAM HYDROCHLORIDE 2 MG: 1 INJECTION, SOLUTION INTRAMUSCULAR; INTRAVENOUS at 12:42

## 2023-09-19 RX ADMIN — Medication 10 MG: at 12:56

## 2023-09-19 RX ADMIN — GLYCOPYRROLATE 0.2 MCG: 0.2 INJECTION, SOLUTION INTRAMUSCULAR; INTRAVITREAL at 12:42

## 2023-09-19 RX ADMIN — Medication 10 MG: at 13:06

## 2023-09-19 RX ADMIN — SODIUM CHLORIDE, POTASSIUM CHLORIDE, SODIUM LACTATE AND CALCIUM CHLORIDE: 600; 310; 30; 20 INJECTION, SOLUTION INTRAVENOUS at 12:25

## 2023-09-19 RX ADMIN — Medication 10 MG: at 13:00

## 2023-09-19 RX ADMIN — METOCLOPRAMIDE 5 MG: 5 INJECTION, SOLUTION INTRAMUSCULAR; INTRAVENOUS at 12:42

## 2023-09-19 RX ADMIN — Medication 10 MG: at 13:11

## 2023-09-19 NOTE — DISCHARGE INSTRUCTIONS
POST OP DISCHARGE INSTRUCTIONS FOR DR. GEORGINA VANCE, PODIATRY        Keep your dressing clean, dry, and intact until your follow up appointment.  Do not remove the dressing.    Apply walking shoe/ boot.    Weight bearing as tolerated with walker/crutches.      The office will call you with your follow up appointment.No pushing, pulling, tugging,  heavy lifting, or strenuous activity.  No major decision making, driving, or drinking alcoholic beverages for 24 hours. ( due to the medications you have  received)  Always use good hand hygiene/washing techniques.  NO driving while taking pain medications.    * if you have an incision:  Check your incision area every day for signs of infection.   Check for:  * more redness, swelling, or pain  *more fluid or blood  *warmth  *pus or bad smellTo assist you in voiding:  Drink plenty of fluids  Listen to running water while attempting to void.    If you are unable to urinate and you have an uncomfortable urge to void or it has been   6 hours since you were discharged, return to the Emergency RoomRest today  No pushing,pulling,tugging,heavy lifting, or strenuous activity   No major decision making,driving,or drinking alcoholic beverages for 24 hours due to the sedation you received  Always use good hand hygiene/washing technique  No driving on pain medication.Rest today  No pushing,pulling,tugging,heavy lifting, or strenuous activity   No major decision making,driving,or drinking alcoholic beverages for 24 hours due to the sedation you received  Always use good hand hygiene/washing technique  No driving on pain medication.

## 2023-09-19 NOTE — ANESTHESIA POSTPROCEDURE EVALUATION
Patient: Raul Welch    Procedure Summary       Date: 09/19/23 Room / Location: [facility] OR 3 /  [facility]    Anesthesia Start: 1242 Anesthesia Stop:     Procedure: LEFT SECOND TOE AMPUTATION (Left: Toe Second) Diagnosis:       Osteomyelitis of second toe of left foot      (Osteomyelitis of second toe of left foot [M86.9])    Surgeons: Shante Leslie DPM Provider: Anil Kinsey CRNA    Anesthesia Type: MAC ASA Status: 4            Anesthesia Type: MAC    Vitals  No vitals data found for the desired time range.          Post Anesthesia Care and Evaluation    Patient location during evaluation: PHASE II  Patient participation: complete - patient participated  Level of consciousness: awake  Pain score: 0  Pain management: adequate    Airway patency: patent  Anesthetic complications: No anesthetic complications  PONV Status: none  Cardiovascular status: acceptable  Respiratory status: acceptable  Hydration status: acceptable    Comments: See R.N. note for postop vital signs.vsss resp spont, reflexes intact, responsive, report given to pacu nurse

## 2023-09-19 NOTE — H&P
PODIATRIC SURGERY  History and Physical      Principal problem: Osteomyelitis of second toe of left foot    Subjective .     History of present illness:    Mr. Raul Welch is a 48 y.o. years old male with past medical history significant for diabetes, COPD, obesity has been treated as an outpatient for a ulceration on the distal aspect of his left 2nd toe that has developed associated osteomyelitis to the distal phalanx. He elects to proceed with amputation of the toe after thorough discussion of all alternatives, risks, and benefits.     History taken from: patient    Case was discussed with patient    Review of Systems    Constitutional: no fever, chills and night sweats. No appetite change or unexpected weight change. No fatigue.  Eyes: no eye drainage, itching or redness.  HEENT: no mouth sores, dysphagia or nose bleed.  Respiratory: no for shortness of breath, cough or production of sputum.  Cardiovascular: no chest pain, no palpitations, no orthopnea.  Gastrointestinal: no nausea, vomiting or diarrhea. No abdominal pain, hematemesis or rectal bleeding.  Genitourinary: no dysuria or polyuria.  Hematologic/lymphatic: no lymph node abnormalities, no easy bruising or easy bleeding.  Musculoskeletal: no muscle or joint pain.  Skin: No rash and no itching.  Neurological: no loss of consciousness, no seizure, no headache.  Behavioral/Psych: no depression or suicidal ideation.  Endocrine: no hot flashes.  Immunologic: negative.    Past Medical History    Past Medical History:   Diagnosis Date    Arthritis     Asthma     SPOUSE REPORTS ISSUES AS A CHILD    Back pain     Bell's palsy     Blood in stool     Depression     Diabetes     Diarrhea     WITH ABDOMINAL CRAMPING    Diverticulitis     Elevated cholesterol     GERD (gastroesophageal reflux disease)     Headache     High cholesterol     History of being tatooed     History of bronchitis     History of fracture     HX OF FOOT (SPOUSE UNSURE LATERALITY)     "History of gout     Hypertension     Myocardial infarction     Sleep apnea     USES BIPAP HS - TO BRING IN THE DOS    Tattoos     Type 2 diabetes mellitus     Vitamin D deficiency 2023    Wears glasses        Past Surgical History    Past Surgical History:   Procedure Laterality Date    APPENDECTOMY      CARDIAC CATHETERIZATION      CARDIAC CATHETERIZATION N/A 2017    Procedure: Coronary angiography;  Surgeon: Zander Bobo MD;  Location: [facility] CATH INVASIVE LOCATION;  Service:     COLONOSCOPY N/A 2018    Procedure: COLONOSCOPY with hot snare polypectomy,  cold snare polypectomy, cold biopsy polypectomies, and biopsies;  Surgeon: Sandro Potter MD;  Location: [facility] ENDOSCOPY;  Service: Gastroenterology    KNEE SURGERY Left     removed infection/mass in knee    OTHER SURGICAL HISTORY      SPOUSE REPORTS GROWTH REMOVED FROM THROAT WHEN PATIENT WAS A YOUNG CHILD       Family History    Family History   Problem Relation Age of Onset    Diabetes Mother     Obesity Mother     Alcohol abuse Father     Diabetes Father     Heart disease Father     Colon cancer Neg Hx     Cirrhosis Neg Hx     Liver cancer Neg Hx     Liver disease Neg Hx        Social History    Social History     Tobacco Use    Smoking status: Former     Packs/day: 0.25     Years: 0.50     Pack years: 0.13     Types: Cigarettes     Quit date: 1990     Years since quittin.7    Smokeless tobacco: Never    Tobacco comments:     Smoked a [employer]   Vaping Use    Vaping Use: Never used   Substance Use Topics    Alcohol use: Never    Drug use: Never       Allergies    Patient has no known allergies.    Objective     /79 (BP Location: Right arm, Patient Position: Sitting)   Pulse 86   Temp 97 °F (36.1 °C) (Temporal)   Resp 14   Ht 193 cm (76\")   Wt (!) 164 kg (362 lb)   SpO2 97%   BMI 44.06 kg/m²     Temp:  [97 °F (36.1 °C)] 97 °F (36.1 °C)      No intake or output data in the 24 hours ending " 09/19/23 1238      Physical Exam:     General Appearance:    Alert, cooperative, in no acute distress   Head:    Normocephalic, without obvious abnormality, atraumatic    Heart:    Regular rhythm and normal rate     Chest Wall:    No abnormalities observed   Abdomen:    Soft non-tender, non-distended, no guarding, no rebound   tenderness   Extremities:   Moves all extremities well, no cyanosis. Distal left 2nd toe ulceration 0.7cm x 0.8cm, granular base, mild serosanguinous drainage, +probe to bone, erythema and moderate edema to distal toe.   Pulses:   Pulses palpable and equal bilaterally   Skin:   No bleeding, bruising or rash   Lymph nodes:   No palpable adenopathy                   Lab Results   Component Value Date    NEUTROABS 4.61 07/18/2023                   Imaging Results (Last 24 Hours)       ** No results found for the last 24 hours. **              Results Review:    I have personally reviewed laboratory data, culture results, radiology studies and antimicrobial therapy.    Hospital Medications (active)         Dose Frequency Start End    ceFAZolin in Sodium Chloride (ANCEF) IVPB solution 3,000 mg 3,000 mg Once 9/19/2023     Admin Instructions: Administer Within 1 Hour of Surgical Incision.  Redose 4 Hours From Pre-Op Dose if Procedure Ongoing or Blood Loss Greater  Than 1.5 L    Caution: Look alike/sound alike drug alert. Refrigerate    Route: Intravenous    lactated ringers infusion 1,000 mL 1,000 mL Continuous 9/19/2023 9/21/2023    Route: Intravenous    sodium chloride 0.9 % flush 10 mL 10 mL As Needed 9/19/2023     Route: Intravenous              PROBLEM LIST:    Patient Active Problem List   Diagnosis    Headache    Restless legs syndrome    GONZÁLEZ treated with BiPAP    Excessive daytime sleepiness    History of myocardial infarction    Other chest pain    Esophageal spasm    Reactive airway disease with wheezing, moderate persistent, uncomplicated    Essential hypertension    Uncontrolled type 2  diabetes mellitus with hyperglycemia    Depression    Facial droop    Weakness of left leg    Bell's palsy    Diarrhea    LUQ pain    Diabetic peripheral neuropathy associated with type 2 diabetes mellitus    Mixed hyperlipidemia    Vitamin D deficiency    Osteomyelitis of second toe of left foot       [unfilled]    ASSESSMENT:    Left foot second toe distal phalanx osteomyelitis with associated diabetic neuropathic ulceration    PLAN:    Proceeding with partial amputation left second toe after thorough discussion of all alternatives, risks, and benefits.      Patients findings and recommendations were discussed with patient    Code Status:   There are no questions and answers to display.       Shante Leslie DPM  09/19/23  12:38 EDT

## 2023-09-21 LAB — REF LAB TEST METHOD: NORMAL

## 2023-10-03 ENCOUNTER — OFFICE VISIT (OUTPATIENT)
Dept: ENDOCRINOLOGY | Facility: CLINIC | Age: 48
End: 2023-10-03
Payer: COMMERCIAL

## 2023-10-03 ENCOUNTER — PRIOR AUTHORIZATION (OUTPATIENT)
Dept: ENDOCRINOLOGY | Facility: CLINIC | Age: 48
End: 2023-10-03

## 2023-10-03 VITALS
BODY MASS INDEX: 38.36 KG/M2 | SYSTOLIC BLOOD PRESSURE: 126 MMHG | OXYGEN SATURATION: 96 % | HEIGHT: 76 IN | HEART RATE: 92 BPM | WEIGHT: 315 LBS | DIASTOLIC BLOOD PRESSURE: 70 MMHG

## 2023-10-03 DIAGNOSIS — E11.65 UNCONTROLLED TYPE 2 DIABETES MELLITUS WITH HYPERGLYCEMIA: Primary | Chronic | ICD-10-CM

## 2023-10-03 DIAGNOSIS — E55.9 VITAMIN D DEFICIENCY: Chronic | ICD-10-CM

## 2023-10-03 LAB
ALBUMIN SERPL-MCNC: 4.3 G/DL (ref 3.5–5.2)
ALBUMIN/GLOB SERPL: 1.4 G/DL
ALP SERPL-CCNC: 101 U/L (ref 39–117)
ALT SERPL W P-5'-P-CCNC: 39 U/L (ref 1–41)
ANION GAP SERPL CALCULATED.3IONS-SCNC: 13.6 MMOL/L (ref 5–15)
AST SERPL-CCNC: 28 U/L (ref 1–40)
BILIRUB SERPL-MCNC: 0.7 MG/DL (ref 0–1.2)
BUN SERPL-MCNC: 15 MG/DL (ref 6–20)
BUN/CREAT SERPL: 15 (ref 7–25)
CALCIUM SPEC-SCNC: 9.8 MG/DL (ref 8.6–10.5)
CHLORIDE SERPL-SCNC: 100 MMOL/L (ref 98–107)
CHOLEST SERPL-MCNC: 108 MG/DL (ref 0–200)
CO2 SERPL-SCNC: 23.4 MMOL/L (ref 22–29)
CREAT SERPL-MCNC: 1 MG/DL (ref 0.76–1.27)
EGFRCR SERPLBLD CKD-EPI 2021: 91.7 ML/MIN/1.73
EXPIRATION DATE: ABNORMAL
EXPIRATION DATE: NORMAL
GLOBULIN UR ELPH-MCNC: 3.1 GM/DL
GLUCOSE BLDC GLUCOMTR-MCNC: 162 MG/DL (ref 70–130)
GLUCOSE SERPL-MCNC: 137 MG/DL (ref 65–99)
HBA1C MFR BLD: 8 %
HDLC SERPL-MCNC: 28 MG/DL (ref 40–60)
LDLC SERPL CALC-MCNC: 31 MG/DL (ref 0–100)
LDLC/HDLC SERPL: 0.44 {RATIO}
Lab: ABNORMAL
Lab: NORMAL
POTASSIUM SERPL-SCNC: 4.3 MMOL/L (ref 3.5–5.2)
PROT SERPL-MCNC: 7.4 G/DL (ref 6–8.5)
SODIUM SERPL-SCNC: 137 MMOL/L (ref 136–145)
TRIGL SERPL-MCNC: 339 MG/DL (ref 0–150)
VLDLC SERPL-MCNC: 49 MG/DL (ref 5–40)

## 2023-10-03 PROCEDURE — 3078F DIAST BP <80 MM HG: CPT | Performed by: PHYSICIAN ASSISTANT

## 2023-10-03 PROCEDURE — 3052F HG A1C>EQUAL 8.0%<EQUAL 9.0%: CPT | Performed by: PHYSICIAN ASSISTANT

## 2023-10-03 PROCEDURE — 1160F RVW MEDS BY RX/DR IN RCRD: CPT | Performed by: PHYSICIAN ASSISTANT

## 2023-10-03 PROCEDURE — 99214 OFFICE O/P EST MOD 30 MIN: CPT | Performed by: PHYSICIAN ASSISTANT

## 2023-10-03 PROCEDURE — 1159F MED LIST DOCD IN RCRD: CPT | Performed by: PHYSICIAN ASSISTANT

## 2023-10-03 PROCEDURE — 3074F SYST BP LT 130 MM HG: CPT | Performed by: PHYSICIAN ASSISTANT

## 2023-10-03 PROCEDURE — 82306 VITAMIN D 25 HYDROXY: CPT | Performed by: PHYSICIAN ASSISTANT

## 2023-10-03 PROCEDURE — 83036 HEMOGLOBIN GLYCOSYLATED A1C: CPT | Performed by: PHYSICIAN ASSISTANT

## 2023-10-03 PROCEDURE — 80061 LIPID PANEL: CPT | Performed by: PHYSICIAN ASSISTANT

## 2023-10-03 PROCEDURE — 84443 ASSAY THYROID STIM HORMONE: CPT | Performed by: PHYSICIAN ASSISTANT

## 2023-10-03 PROCEDURE — 80053 COMPREHEN METABOLIC PANEL: CPT | Performed by: PHYSICIAN ASSISTANT

## 2023-10-03 PROCEDURE — 95251 CONT GLUC MNTR ANALYSIS I&R: CPT | Performed by: PHYSICIAN ASSISTANT

## 2023-10-03 RX ORDER — TIRZEPATIDE 10 MG/.5ML
10 INJECTION, SOLUTION SUBCUTANEOUS WEEKLY
Qty: 6 ML | Refills: 0 | Status: SHIPPED | OUTPATIENT
Start: 2023-10-03

## 2023-10-03 NOTE — TELEPHONE ENCOUNTER
PA for Mounjaro 10 mg has been submitted via [facility].

## 2023-10-03 NOTE — PROGRESS NOTES
Chief Complaint  F/u for Diabetes Mellitus.    HPI   Raul Welch is a 48 y.o. male who is here today for f/u of Diabetes Mellitus type 2. The initial diagnosis of diabetes was made in his 20s.     He had an ulcer on left second toe. Had to have surgery. Half the toe was removed. Following closely with podiatry.   Occasionally misses lantus dose.     On weekly vit d supplement.    Diabetic complications: peripheral neuropathy  Eye exam current (within one year): [facility]    Current diabetic medications include:  Metformin 1000mg BID  Invokana 300 mg daily  Trulicity 4.5 mg weekly  Lantus 80u qhs    Statin: lipitor 40, changed from zocor 20 6/2020, vascepa started 6/2020    Past medications: ozempic 0.5 mg (changed to trulicity due to insurance preference), basaglar, Steglatro, glyburide    Diabetic Monitoring  -   dexcom downloaded and reviewed (9/20/23-10/3/23/23): time in range 24%, BG mostly 170-250, comes down to ~100 when not eating for a long time, no hypoglycemia    Freestyle Jose was expensive.     The following portions of the patient's history were reviewed and updated by me as appropriate: allergies, current medications, past family history, past social history, past surgical history and problem list.      Past Medical History:   Diagnosis Date    Arthritis     Asthma     SPOUSE REPORTS ISSUES AS A CHILD    Back pain     Bell's palsy     Blood in stool     Depression     Diabetes     Diarrhea     WITH ABDOMINAL CRAMPING    Diverticulitis     Elevated cholesterol     GERD (gastroesophageal reflux disease)     Headache     High cholesterol     History of being tatooed     History of bronchitis     History of fracture     HX OF FOOT (SPOUSE UNSURE LATERALITY)    History of gout     Hypertension     Myocardial infarction 1997    Sleep apnea     USES BIPAP HS - TO BRING IN THE DOS    Tattoos     Type 2 diabetes mellitus     Vitamin D deficiency 05/30/2023    Wears glasses         Medications    Current Outpatient Medications:     B-D UF III MINI PEN NEEDLES 31G X 5 MM misc, USE ONCE DAILY WITH INSULIN INJECTIONS AS DIRECTED, Disp: , Rfl: 11    Blood Glucose Monitoring Suppl device, Use as directed to check blood sugar, Disp: 1 each, Rfl: 0    carvedilol (COREG) 12.5 MG tablet, Take 1 tablet by mouth Every 12 (Twelve) Hours., Disp: , Rfl:     cetirizine (ZyrTEC) 10 MG tablet, Take 12 mg by mouth Daily., Disp: , Rfl:     citalopram (CeleXA) 20 MG tablet, Take 1 tablet by mouth Daily., Disp: , Rfl:     Continuous Blood Gluc  (Dexcom G6 ) device, 1 each Continuous., Disp: 1 each, Rfl: 0    Continuous Blood Gluc Sensor (Dexcom G6 Sensor), Every 10 (Ten) Days., Disp: 3 each, Rfl: 11    Continuous Blood Gluc Transmit (Dexcom G6 Transmitter) misc, 1 each Every 3 (Three) Months., Disp: 1 each, Rfl: 3    glucose blood (OneTouch Ultra) test strip, Test Three times daily, Disp: 100 each, Rfl: 3    hydrochlorothiazide (HYDRODIURIL) 25 MG tablet, Take 1 tablet by mouth Daily., Disp: , Rfl:     icosapent ethyl (VASCEPA) 1 g capsule capsule, Take 2 g by mouth 2 (Two) Times a Day With Meals., Disp: 360 capsule, Rfl: 1    Insulin Pen Needle 32G X 6 MM misc, Use daily with insulin, Disp: 100 each, Rfl: 11    Invokana 300 MG tablet tablet, TAKE 1 TABLET BY MOUTH EVERY DAY, Disp: 90 tablet, Rfl: 1    Lantus SoloStar 100 UNIT/ML injection pen, Inject 80 Units under the skin into the appropriate area as directed Every Night., Disp: 72 mL, Rfl: 1    lisinopril (PRINIVIL,ZESTRIL) 40 MG tablet, Take 1 tablet by mouth Daily., Disp: , Rfl:     meclizine (ANTIVERT) 25 MG tablet, Take 1 tablet by mouth 3 (Three) Times a Day As Needed for Dizziness., Disp: 20 tablet, Rfl: 2    metFORMIN (GLUCOPHAGE) 1000 MG tablet, TAKE 1 TABLET BY MOUTH TWO TIMES A DAY WITH MEALS, Disp: 180 tablet, Rfl: 1    nystatin-triamcinolone (MYCOLOG) 077892-5.1 UNIT/GM-% ointment, Apply to affected area 2 times daily, Disp:  "30 g, Rfl: 1    Omega 3 1200 MG capsule, Take 1,000 mg by mouth 2 (Two) Times a Day., Disp: , Rfl:     oxyCODONE-acetaminophen (Percocet) 5-325 MG per tablet, Take 1 tablet by mouth Every 4 (Four) Hours As Needed for Moderate Pain, Disp: 10 tablet, Rfl: 0    pantoprazole (PROTONIX) 20 MG EC tablet, TAKE 1 TABLET BY MOUTH EVERY DAY (Patient taking differently: As Needed.), Disp: 30 tablet, Rfl: 0    vitamin D (ERGOCALCIFEROL) 1.25 MG (16286 UT) capsule capsule, Take 1 capsule by mouth 1 (One) Time Per Week., Disp: 13 capsule, Rfl: 1    Tirzepatide (Mounjaro) 10 MG/0.5ML solution pen-injector, Inject 0.5 mL under the skin into the appropriate area as directed 1 (One) Time Per Week. Change from trulicity, Disp: 6 mL, Rfl: 0    Review of Systems  Review of Systems   All other systems reviewed and are negative.     Physical Exam    /70   Pulse 92   Ht 193 cm (76\")   Wt (!) 158 kg (348 lb)   SpO2 96%   BMI 42.36 kg/m² Body mass index is 42.36 kg/m².  Physical Exam   Constitutional: He is oriented to person, place, and time. He appears well-developed. No distress.   HENT:   Head: Normocephalic.   Right Ear: External ear normal.   Left Ear: External ear normal.   Nose: Nose normal.   Eyes: Conjunctivae are normal. Right eye exhibits no discharge. Left eye exhibits no discharge. No scleral icterus.   Neck: No JVD present. No tracheal deviation present. No thyromegaly present.   Cardiovascular: Normal rate, regular rhythm and normal heart sounds.   No murmur heard.  Pulmonary/Chest: Effort normal and breath sounds normal. No respiratory distress. He has no wheezes.   Musculoskeletal: No tenderness.   Neurological: He is alert and oriented to person, place, and time.   Skin: Skin is warm and dry. No rash noted. He is not diaphoretic. No erythema.   Psychiatric: His behavior is normal. Judgment and thought content normal.     Labs and Imaging   Lab Results   Component Value Date    HGBA1C 8.0 10/03/2023    HGBA1C " 8.70 (H) 2023    HGBA1C 7.3 2023     Office Visit on 10/03/2023   Component Date Value Ref Range Status    Hemoglobin A1C 10/03/2023 8.0  % Final    Lot Number 10/03/2023 [id number]   Final    Expiration Date 10/03/2023 06-   Final    Glucose 10/03/2023 162 (A)  70 - 130 mg/dL Final    Lot Number 10/03/2023 [id number]   Final    Expiration Date 10/03/2023 03-   Final   Admission on 2023, Discharged on 2023   Component Date Value Ref Range Status    Glucose 2023 143 (H)  70 - 130 mg/dL Final    Serial Number: [id number]:  [id number]    Reference Lab Report 2023    Final                    Value:[facility]  [address] [place] [address]  Phone: [phone] or [phone]  Fax: [phone]  Hakan Lundy M.D., Medical Director    PATIENT NAME                           LABORATORY NO.  [id number]  CARLA [address]ER.                   [id number]  [id number]                         AGE              SEX  N           CLIENT REF #  [place] [id number]      1975      [id number]   [id number]    [address] [place] [address] BY-PASS                REQUESTING M.D.     ATTENDING MMerlineD.     COPY TO.   [address]                        [address]  DATE COLLECTED      DATE RECEIVED      DATE REPORTED  2023    DIAGNOSIS:  TOE, LEFT SECOND:  Ulcerated and necrotic skin and subcutaneous tissue  Underlying bone with severe acute osteomyelitis  Negative for specific microorganisms  Negative for dysplasia or malignancy  Benign                           viable surgical resection margin    HARJIT    CLINICAL HISTORY:  Osteomyelitis of second toe of left foot    SPECIMENS RECEIVED:  TOE, LEFT SECOND    MICROSCOPIC DESCRIPTION:  Tissue blocks are prepared and slides are examined microscopically on all  specimens. See  "diagnosis for details.    Professional interpretation rendered by Clarke Raya M.D., LEEANN at [facility], [place], [address] [place] [address].    GROSS DESCRIPTION:  Received in formalin labeled \"left second toe\" is a disarticulated toe measuring  3.6 x 2.5 x 2.5 cm.  The specimen consists of tan-white skin with underlying soft  tissue and bone.  The nail is absent.  At the distal tip of the toe is a tan-yellow  area of discoloration with a possible area of ulceration measuring 0.8 x 0.5 cm,  located 2.3 cm to the closest skin and soft tissue margin and 2.8 cm to the  disarticulated bone.  The skin and soft tissue margin is inked blue.  Representative sections of the specimen are submitted as follows:  A1 skin and                           soft tissue margin, en face and disarticulated bone, en face,  following decalcification  A2 possible ulcerated area  A3 possible ulcerated area with underlying bone, following decalcification.  [place]    REVIEWED, DIAGNOSED AND ELECTRONICALLY  SIGNED BY:    Clarke Raya M.D., VIK.  CPT CODES:  43393, 88311x2         Assessment / Plan   Diagnoses and all orders for this visit:    1. Uncontrolled type 2 diabetes mellitus with hyperglycemia (Primary)  -     POC Glycosylated Hemoglobin (Hb A1C)  -     POC Glucose, Blood  -     Tirzepatide (Mounjaro) 10 MG/0.5ML solution pen-injector; Inject 0.5 mL under the skin into the appropriate area as directed 1 (One) Time Per Week. Change from trulicity  Dispense: 6 mL; Refill: 0  -     Lipid Panel  -     TSH  -     Comprehensive Metabolic Panel    2. Vitamin D deficiency  -     Vitamin D,25-Hydroxy        Diabetes Mellitus 2 is under inadequate control  -A1c 8, up from 7.3 last visit  -  -24% time in range, down from 63% last visit  -change trulicity to mounjaro 10 mg daily with plan to increase if tolerating  -continue lantus to 80 u daily - hesitant to increase dose, when he goes a while without eating his BG comes down " to around 100  -continue Invokana 300 mg daily, metformin 1000 mg BID  -continue to work on diet  -may need prandial insulin in the future  -eye exam utd  -Labs todya  -Foot exam next visit when left foot is out of boot    Vit d deficiency  -repeat today  -continue weekly supplement    There are no Patient Instructions on file for this visit.    Follow up: Return in about 4 months (around 2/3/2024).      Jo-Ann Clark PA-C

## 2023-10-04 LAB
25(OH)D3 SERPL-MCNC: 37.6 NG/ML (ref 30–100)
TSH SERPL DL<=0.05 MIU/L-ACNC: 1.2 UIU/ML (ref 0.27–4.2)

## 2023-10-06 DIAGNOSIS — E78.2 MIXED HYPERLIPIDEMIA: ICD-10-CM

## 2023-10-06 RX ORDER — ATORVASTATIN CALCIUM 40 MG/1
TABLET, FILM COATED ORAL
Qty: 90 TABLET | Refills: 0 | OUTPATIENT
Start: 2023-10-06

## 2023-10-06 NOTE — TELEPHONE ENCOUNTER
Called patient to verify if he is taking this or not. He stated it was d/c and changed. Will refuse rx.

## 2023-10-09 DIAGNOSIS — E11.65 UNCONTROLLED TYPE 2 DIABETES MELLITUS WITH HYPERGLYCEMIA: Chronic | ICD-10-CM

## 2023-10-09 DIAGNOSIS — E78.2 MIXED HYPERLIPIDEMIA: Chronic | ICD-10-CM

## 2023-10-09 RX ORDER — ICOSAPENT ETHYL 1000 MG/1
2 CAPSULE ORAL 2 TIMES DAILY WITH MEALS
Qty: 360 CAPSULE | Refills: 1 | Status: SHIPPED | OUTPATIENT
Start: 2023-10-09

## 2023-10-09 RX ORDER — CANAGLIFLOZIN 300 MG/1
TABLET, FILM COATED ORAL
Qty: 90 TABLET | Refills: 1 | Status: SHIPPED | OUTPATIENT
Start: 2023-10-09

## 2023-10-10 ENCOUNTER — TELEPHONE (OUTPATIENT)
Dept: ENDOCRINOLOGY | Facility: CLINIC | Age: 48
End: 2023-10-10
Payer: COMMERCIAL

## 2023-10-10 RX ORDER — DULAGLUTIDE 4.5 MG/.5ML
4.5 INJECTION, SOLUTION SUBCUTANEOUS WEEKLY
Qty: 6 ML | Refills: 1 | Status: SHIPPED | OUTPATIENT
Start: 2023-10-10

## 2023-11-28 ENCOUNTER — OFFICE VISIT (OUTPATIENT)
Dept: NEUROLOGY | Facility: CLINIC | Age: 48
End: 2023-11-28
Payer: COMMERCIAL

## 2023-11-28 VITALS
BODY MASS INDEX: 38.36 KG/M2 | HEIGHT: 76 IN | HEART RATE: 89 BPM | SYSTOLIC BLOOD PRESSURE: 142 MMHG | WEIGHT: 315 LBS | DIASTOLIC BLOOD PRESSURE: 82 MMHG | OXYGEN SATURATION: 96 % | TEMPERATURE: 97.3 F

## 2023-11-28 DIAGNOSIS — G47.33 OSA TREATED WITH BIPAP: Primary | ICD-10-CM

## 2023-11-28 PROCEDURE — 1160F RVW MEDS BY RX/DR IN RCRD: CPT | Performed by: NURSE PRACTITIONER

## 2023-11-28 PROCEDURE — 3077F SYST BP >= 140 MM HG: CPT | Performed by: NURSE PRACTITIONER

## 2023-11-28 PROCEDURE — 1159F MED LIST DOCD IN RCRD: CPT | Performed by: NURSE PRACTITIONER

## 2023-11-28 PROCEDURE — 3079F DIAST BP 80-89 MM HG: CPT | Performed by: NURSE PRACTITIONER

## 2023-11-28 PROCEDURE — 99213 OFFICE O/P EST LOW 20 MIN: CPT | Performed by: NURSE PRACTITIONER

## 2023-11-28 NOTE — PROGRESS NOTES
Follow up Sleep Patient Office Visit      Patient Name: Raul Welch  : 1975   MRN: [id number]     Referring Physician: No ref. provider found    Chief Complaint:    Chief Complaint   Patient presents with    Follow-up     Patient in office to follow up on gonzález.        History of Present Illness: Raul Welch is a 48 y.o. male who is here today to follow up with GONZÁLEZ.  Currently on AutoBipap max IPAP 25cm, min EPAP 15cm, PS 2-8cm, compliance 100%, AHI 2/hour.  He is tolerating his pressures well.  He is using [facility] and a nasal mask.  He would like to try a full face mask and although an order for that was sent last year he says he never received a full face mask.  Has some leaking with the nasal mask.      Pertinent Medical History:   Current compliance report reviewed and has been scanned into the patient's medical record.    Subjective      Review of Systems:   Review of Systems    Medications:     Current Outpatient Medications:     B-D UF III MINI PEN NEEDLES 31G X 5 MM misc, USE ONCE DAILY WITH INSULIN INJECTIONS AS DIRECTED, Disp: , Rfl: 11    Blood Glucose Monitoring Suppl device, Use as directed to check blood sugar, Disp: 1 each, Rfl: 0    Canagliflozin (Invokana) 300 MG tablet tablet, TAKE 1 TABLET BY MOUTH EVERY DAY, Disp: 90 tablet, Rfl: 1    carvedilol (COREG) 12.5 MG tablet, Take 1 tablet by mouth Every 12 (Twelve) Hours., Disp: , Rfl:     cetirizine (ZyrTEC) 10 MG tablet, Take 12 mg by mouth Daily., Disp: , Rfl:     citalopram (CeleXA) 20 MG tablet, Take 1 tablet by mouth Daily., Disp: , Rfl:     Continuous Blood Gluc  (Dexcom G6 ) device, 1 each Continuous., Disp: 1 each, Rfl: 0    Continuous Blood Gluc Sensor (Dexcom G6 Sensor), Every 10 (Ten) Days., Disp: 3 each, Rfl: 11    Continuous Blood Gluc Transmit (Dexcom G6 Transmitter) misc, 1 each Every 3 (Three) Months., Disp: 1 each, Rfl: 3    Dulaglutide (Trulicity) 4.5 MG/0.5ML solution pen-injector, Inject  0.5 mL under the skin into the appropriate area as directed 1 (One) Time Per Week., Disp: 6 mL, Rfl: 1    glucose blood (OneTouch Ultra) test strip, Test Three times daily, Disp: 100 each, Rfl: 3    hydrochlorothiazide (HYDRODIURIL) 25 MG tablet, Take 1 tablet by mouth Daily., Disp: , Rfl:     icosapent ethyl (VASCEPA) 1 g capsule capsule, TAKE 2 CAPSULES BY MOUTH 2 TIMES A DAY WITH MEALS, Disp: 360 capsule, Rfl: 1    Insulin Pen Needle 32G X 6 MM misc, Use daily with insulin, Disp: 100 each, Rfl: 11    Lantus SoloStar 100 UNIT/ML injection pen, Inject 80 Units under the skin into the appropriate area as directed Every Night., Disp: 72 mL, Rfl: 1    lisinopril (PRINIVIL,ZESTRIL) 40 MG tablet, Take 1 tablet by mouth Daily., Disp: , Rfl:     meclizine (ANTIVERT) 25 MG tablet, Take 1 tablet by mouth 3 (Three) Times a Day As Needed for Dizziness., Disp: 20 tablet, Rfl: 2    metFORMIN (GLUCOPHAGE) 1000 MG tablet, TAKE 1 TABLET BY MOUTH TWO TIMES A DAY WITH MEALS, Disp: 180 tablet, Rfl: 1    Omega 3 1200 MG capsule, Take 1,000 mg by mouth 2 (Two) Times a Day., Disp: , Rfl:     pantoprazole (PROTONIX) 20 MG EC tablet, TAKE 1 TABLET BY MOUTH EVERY DAY (Patient taking differently: As Needed.), Disp: 30 tablet, Rfl: 0    vitamin D (ERGOCALCIFEROL) 1.25 MG (81308 UT) capsule capsule, Take 1 capsule by mouth 1 (One) Time Per Week., Disp: 13 capsule, Rfl: 1    nystatin-triamcinolone (MYCOLOG) 555322-7.1 UNIT/GM-% ointment, Apply to affected area 2 times daily (Patient not taking: Reported on 11/28/2023), Disp: 30 g, Rfl: 1    oxyCODONE-acetaminophen (Percocet) 5-325 MG per tablet, Take 1 tablet by mouth Every 4 (Four) Hours As Needed for Moderate Pain (Patient not taking: Reported on 11/28/2023), Disp: 10 tablet, Rfl: 0    Allergies:   No Known Allergies    Objective     Physical Exam:  Vital Signs:   Vitals:    11/28/23 1307   BP: 142/82   BP Location: Right arm   Patient Position: Sitting   Cuff Size: Adult   Pulse: 89   Temp:  "97.3 °F (36.3 °C)   SpO2: 96%   Weight: (!) 160 kg (352 lb)   Height: 193 cm (76\")   PainSc: 0-No pain     BMI: Body mass index is 42.85 kg/m².    Physical Exam  Vitals and nursing note reviewed.   Constitutional:       General: He is not in acute distress.     Appearance: He is well-developed. He is obese. He is not diaphoretic.   HENT:      Head: Normocephalic and atraumatic.   Eyes:      Extraocular Movements: Extraocular movements intact.      Conjunctiva/sclera: Conjunctivae normal.   Pulmonary:      Effort: Pulmonary effort is normal. No respiratory distress.   Musculoskeletal:         General: Normal range of motion.   Skin:     General: Skin is warm and dry.   Neurological:      Mental Status: He is alert and oriented to person, place, and time.   Psychiatric:         Mood and Affect: Mood normal.         Behavior: Behavior normal.         Thought Content: Thought content normal.         Judgment: Judgment normal.         Assessment / Plan      Assessment/Plan:   Diagnoses and all orders for this visit:    1. GONZÁLEZ treated with BiPAP (Primary)    2. BMI 40.0-44.9, adult    *Encouraged continued weight loss with a BMI goal of 24.   *Advised patient to avoid driving if drowsy.   *Order for supplies sent to [facility] via [facility].     Follow Up:   Return in about 1 year (around 11/28/2024) for F/U Obstructive Sleep Apnea.    I have advised the patient the need to continue the use of CPAP.  Gold standard for treatment of sleep apnea includes weight loss, use of cpap and avoidance of alcohol.  Untreated GONZÁLEZ may increase the risk for development of hypertension, stroke, myocardial infarction, diabetes, cardiovascular disease, work-related issues and driving accidents. I have counseled and advised the patient to avoid driving or operating heavy/dangerous equipment if feeling drowsy.     HUMPHREY Michele, FNP-C  [facility]     "

## 2023-11-30 DIAGNOSIS — Z79.4 CONTROLLED TYPE 2 DIABETES MELLITUS WITH HYPOGLYCEMIA, WITH LONG-TERM CURRENT USE OF INSULIN: Chronic | ICD-10-CM

## 2023-11-30 DIAGNOSIS — E11.649 CONTROLLED TYPE 2 DIABETES MELLITUS WITH HYPOGLYCEMIA, WITH LONG-TERM CURRENT USE OF INSULIN: Chronic | ICD-10-CM

## 2023-11-30 RX ORDER — PROCHLORPERAZINE 25 MG/1
SUPPOSITORY RECTAL
Qty: 3 EACH | Refills: 5 | Status: SHIPPED | OUTPATIENT
Start: 2023-11-30

## 2023-11-30 NOTE — TELEPHONE ENCOUNTER
Rx Refill Note  Requested Prescriptions     Pending Prescriptions Disp Refills    Continuous Blood Gluc Sensor (Dexcom G6 Sensor) 3 each 11     Sig: Use Every 10 (Ten) Days.      Last office visit with prescribing clinician: 10/3/2023     Next office visit with prescribing clinician: 2/20/2024

## 2023-11-30 NOTE — TELEPHONE ENCOUNTER
"Caller: Raul Welch \"Earnest\"    Relationship: Self    Best call back number: [phone]    Requested Prescriptions:   Requested Prescriptions     Pending Prescriptions Disp Refills    Continuous Blood Gluc Sensor (Dexcom G6 Sensor) 3 each 11     Sig: Use Every 10 (Ten) Days.        Pharmacy where request should be sent:  [facility]    Last office visit with prescribing clinician: 10/3/2023   Last telemedicine visit with prescribing clinician: Visit date not found   Next office visit with prescribing clinician: 2/20/2024     Additional details provided by patient: PATIENT ALSO SAID THAT HE'S NOT BE ABLE TO GET THE MOUNJARO SO HE'S BEEN CONTINUING WITH THE TRULICITY    Does the patient have less than a 3 day supply:  [x] Yes  [] No    Would you like a call back once the refill request has been completed: [x] Yes [] No    If the office needs to give you a call back, can they leave a voicemail: [x] Yes [] No    Pan Munguia Rep   11/30/23 11:44 EST         "

## 2023-12-15 ENCOUNTER — TELEPHONE (OUTPATIENT)
Dept: ENDOCRINOLOGY | Facility: CLINIC | Age: 48
End: 2023-12-15

## 2023-12-15 NOTE — TELEPHONE ENCOUNTER
Spoke with patient and he voiced understanding. He has a month worth of Trulicity left but states the pharmacy sent him a message to let him know his Mounjaro is ready to be picked up.

## 2023-12-15 NOTE — TELEPHONE ENCOUNTER
"  Caller: Raul Welch \"Earnest\"    Relationship: Self    Best call back number: [phone] (home)       What is the best time to reach you: AFTERNOON    Who are you requesting to speak with (clinical staff, provider,  specific staff member): CLINICAL      What was the call regarding: PT PHARMACY GOT IN MOUNRO AND PT WANTS TO KNOW IF HE SHOULD FINISH THE TRULICITY FIRST OR JUST START THE MOUNJARO. PLEASE CALL PT TO ADVISE.    Is it okay if the provider responds through MyChart: YES  "

## 2023-12-15 NOTE — TELEPHONE ENCOUNTER
If he only has a couple of Trulicity pens left then I would just finish them out and then start Mounjaro.  However, if he has a couple months worth of Trulicity then I would just switch over to Mounjaro and keep the Trulicity in the fridge for now.

## 2023-12-26 ENCOUNTER — PRIOR AUTHORIZATION (OUTPATIENT)
Dept: ENDOCRINOLOGY | Facility: CLINIC | Age: 48
End: 2023-12-26
Payer: COMMERCIAL

## 2024-02-26 ENCOUNTER — OFFICE VISIT (OUTPATIENT)
Dept: ENDOCRINOLOGY | Facility: CLINIC | Age: 49
End: 2024-02-26
Payer: COMMERCIAL

## 2024-02-26 VITALS
OXYGEN SATURATION: 98 % | HEIGHT: 76 IN | DIASTOLIC BLOOD PRESSURE: 80 MMHG | HEART RATE: 88 BPM | WEIGHT: 315 LBS | SYSTOLIC BLOOD PRESSURE: 116 MMHG | BODY MASS INDEX: 38.36 KG/M2

## 2024-02-26 DIAGNOSIS — E11.42 TYPE 2 DIABETES, CONTROLLED, WITH PERIPHERAL NEUROPATHY: Primary | Chronic | ICD-10-CM

## 2024-02-26 LAB
EXPIRATION DATE: ABNORMAL
EXPIRATION DATE: ABNORMAL
GLUCOSE BLDC GLUCOMTR-MCNC: 169 MG/DL (ref 70–130)
HBA1C MFR BLD: 7.2 % (ref 4.5–5.7)
Lab: ABNORMAL
Lab: ABNORMAL

## 2024-02-26 PROCEDURE — 82043 UR ALBUMIN QUANTITATIVE: CPT | Performed by: PHYSICIAN ASSISTANT

## 2024-02-26 PROCEDURE — 82570 ASSAY OF URINE CREATININE: CPT | Performed by: PHYSICIAN ASSISTANT

## 2024-02-26 RX ORDER — TIRZEPATIDE 10 MG/.5ML
10 INJECTION, SOLUTION SUBCUTANEOUS WEEKLY
Qty: 6 ML | Refills: 1 | Status: SHIPPED | OUTPATIENT
Start: 2024-02-26 | End: 2024-02-27 | Stop reason: SDUPTHER

## 2024-02-26 RX ORDER — ATORVASTATIN CALCIUM 40 MG/1
1 TABLET, FILM COATED ORAL DAILY
COMMUNITY

## 2024-02-26 RX ORDER — TIRZEPATIDE 10 MG/.5ML
10 INJECTION, SOLUTION SUBCUTANEOUS WEEKLY
COMMUNITY
End: 2024-02-26 | Stop reason: SDUPTHER

## 2024-02-26 RX ORDER — INSULIN GLARGINE 100 [IU]/ML
80 INJECTION, SOLUTION SUBCUTANEOUS NIGHTLY
Qty: 72 ML | Refills: 1 | Status: SHIPPED | OUTPATIENT
Start: 2024-02-26 | End: 2024-02-27 | Stop reason: SDUPTHER

## 2024-02-26 RX ORDER — PROCHLORPERAZINE 25 MG/1
1 SUPPOSITORY RECTAL
Qty: 1 EACH | Refills: 3 | Status: SHIPPED | OUTPATIENT
Start: 2024-02-26

## 2024-02-26 NOTE — PROGRESS NOTES
Chief Complaint  F/u for Diabetes Mellitus.    HPI   Raul Welch is a 48 y.o. male who is here today for f/u of Diabetes Mellitus type 2. The initial diagnosis of diabetes was made in his 20s.     He has lost about 20 pounds on Mounjaro.   He had a partial amputation second left toe. Saw podiatry 3 weeks ago. Healing well.      Diabetic complications: peripheral neuropathy s/p partial second left toe amputation  Eye exam current (within one year): [employer], no retinopathy    Current diabetic medications include:  Metformin 1000mg BID  Invokana 300 mg daily  Mounjaro 10 mg weekly - some nausea and diarrhea, but tolerable  Lantus 80 u qhs    Statin: lipitor 40, changed from zocor 20 6/2020, vascepa started 6/2020    Past medications: ozempic 0.5 mg (changed to trulicity due to insurance preference), basaglar, Steglatro, glyburide    Diabetic Monitoring  -   Does not have dexcom  with him. Needs new transmitter. He reports rare hypoglycemia (lowest 72 and 80 - only a couple of times).     Freestyle Jose was expensive.     The following portions of the patient's history were reviewed and updated by me as appropriate: allergies, current medications, past family history, past social history, past surgical history and problem list.      Past Medical History:   Diagnosis Date    Arthritis     Asthma     SPOUSE REPORTS ISSUES AS A CHILD    Back pain     Bell's palsy     Blood in stool     Depression     Diabetes     Diarrhea     WITH ABDOMINAL CRAMPING    Diverticulitis     Elevated cholesterol     GERD (gastroesophageal reflux disease)     Headache     High cholesterol     History of being tatooed     History of bronchitis     History of fracture     HX OF FOOT (SPOUSE UNSURE LATERALITY)    History of gout     Hypertension     Myocardial infarction 1997    Sleep apnea     USES BIPAP HS - TO BRING IN THE DOS    Tattoos     Type 2 diabetes mellitus     Vitamin D deficiency 05/30/2023    Wears  glasses        Medications    Current Outpatient Medications:     atorvastatin (LIPITOR) 40 MG tablet, Take 1 tablet by mouth Daily., Disp: , Rfl:     B-D UF III MINI PEN NEEDLES 31G X 5 MM misc, USE ONCE DAILY WITH INSULIN INJECTIONS AS DIRECTED, Disp: , Rfl: 11    Blood Glucose Monitoring Suppl device, Use as directed to check blood sugar, Disp: 1 each, Rfl: 0    Canagliflozin (Invokana) 300 MG tablet tablet, TAKE 1 TABLET BY MOUTH EVERY DAY, Disp: 90 tablet, Rfl: 1    carvedilol (COREG) 12.5 MG tablet, Take 1 tablet by mouth Every 12 (Twelve) Hours., Disp: , Rfl:     cetirizine (ZyrTEC) 10 MG tablet, Take 12 mg by mouth Daily., Disp: , Rfl:     citalopram (CeleXA) 20 MG tablet, Take 1 tablet by mouth Daily., Disp: , Rfl:     Continuous Blood Gluc  (Dexcom G6 ) device, 1 each Continuous., Disp: 1 each, Rfl: 0    Continuous Blood Gluc Sensor (Dexcom G6 Sensor), Use Every 10 (Ten) Days., Disp: 3 each, Rfl: 5    Continuous Blood Gluc Transmit (Dexcom G6 Transmitter) misc, Use 1 each Every 3 (Three) Months., Disp: 1 each, Rfl: 3    glucose blood (OneTouch Ultra) test strip, Test Three times daily, Disp: 100 each, Rfl: 3    hydrochlorothiazide (HYDRODIURIL) 25 MG tablet, Take 1 tablet by mouth Daily., Disp: , Rfl:     icosapent ethyl (VASCEPA) 1 g capsule capsule, TAKE 2 CAPSULES BY MOUTH 2 TIMES A DAY WITH MEALS, Disp: 360 capsule, Rfl: 1    Insulin Pen Needle 32G X 6 MM misc, Use daily with insulin, Disp: 100 each, Rfl: 11    Lantus SoloStar 100 UNIT/ML injection pen, Inject 80 Units under the skin into the appropriate area as directed Every Night., Disp: 72 mL, Rfl: 1    lisinopril (PRINIVIL,ZESTRIL) 40 MG tablet, Take 1 tablet by mouth Daily., Disp: , Rfl:     meclizine (ANTIVERT) 25 MG tablet, Take 1 tablet by mouth 3 (Three) Times a Day As Needed for Dizziness., Disp: 20 tablet, Rfl: 2    metFORMIN (GLUCOPHAGE) 1000 MG tablet, TAKE 1 TABLET BY MOUTH TWO TIMES A DAY WITH MEALS, Disp: 180 tablet,  "Rfl: 1    Mounjaro 10 MG/0.5ML solution pen-injector pen, Inject 0.5 mL under the skin into the appropriate area as directed 1 (One) Time Per Week., Disp: 6 mL, Rfl: 1    Omega 3 1200 MG capsule, Take 1,000 mg by mouth 2 (Two) Times a Day., Disp: , Rfl:     pantoprazole (PROTONIX) 20 MG EC tablet, TAKE 1 TABLET BY MOUTH EVERY DAY (Patient taking differently: As Needed.), Disp: 30 tablet, Rfl: 0    vitamin D (ERGOCALCIFEROL) 1.25 MG (93537 UT) capsule capsule, Take 1 capsule by mouth 1 (One) Time Per Week., Disp: 13 capsule, Rfl: 1    Review of Systems  Review of Systems   All other systems reviewed and are negative.       Physical Exam    /80   Pulse 88   Ht 193 cm (76\")   Wt (!) 155 kg (342 lb)   SpO2 98%   BMI 41.63 kg/m² Body mass index is 41.63 kg/m².  Physical Exam   Constitutional: He is oriented to person, place, and time. He appears well-developed. No distress.   HENT:   Head: Normocephalic.   Right Ear: External ear normal.   Left Ear: External ear normal.   Nose: Nose normal.   Eyes: Conjunctivae are normal. Right eye exhibits no discharge. Left eye exhibits no discharge. No scleral icterus.   Neck: No JVD present. No tracheal deviation present. No thyromegaly present.   Cardiovascular: Normal rate, regular rhythm and normal heart sounds.   No murmur heard.  Pulmonary/Chest: Effort normal and breath sounds normal. No respiratory distress. He has no wheezes.   Musculoskeletal: No tenderness.   Neurological: He is alert and oriented to person, place, and time.   Skin: Skin is warm and dry. No rash noted. He is not diaphoretic. No erythema.   Psychiatric: His behavior is normal. Judgment and thought content normal.       Labs and Imaging   Lab Results   Component Value Date    HGBA1C 7.2 (A) 02/26/2024    HGBA1C 8.0 10/03/2023    HGBA1C 8.70 (H) 07/18/2023     Office Visit on 02/26/2024   Component Date Value Ref Range Status    Hemoglobin A1C 02/26/2024 7.2 (A)  4.5 - 5.7 % Final    Lot Number " 02/26/2024 [id number]   Final    Expiration Date 02/26/2024 10-   Final    Glucose 02/26/2024 169 (A)  70 - 130 mg/dL Final    Lot Number 02/26/2024 [id number]   Final    Expiration Date 02/26/2024 10-   Final       Assessment / Plan   Diagnoses and all orders for this visit:    1. Type 2 diabetes, controlled, with peripheral neuropathy (Primary)  -     POC Glycosylated Hemoglobin (Hb A1C)  -     POC Glucose, Blood  -     Continuous Blood Gluc Transmit (Dexcom G6 Transmitter) misc; Use 1 each Every 3 (Three) Months.  Dispense: 1 each; Refill: 3  -     Mounjaro 10 MG/0.5ML solution pen-injector pen; Inject 0.5 mL under the skin into the appropriate area as directed 1 (One) Time Per Week.  Dispense: 6 mL; Refill: 1  -     Lantus SoloStar 100 UNIT/ML injection pen; Inject 80 Units under the skin into the appropriate area as directed Every Night.  Dispense: 72 mL; Refill: 1  -     Microalbumin / Creatinine Urine Ratio - Urine, Clean Catch        Diabetes Mellitus 2 is under inadequate control  -A1c 7.2, down from 8 last visit  -  -No home BGs available for review  -Continue Mounjaro 10 mg weekly - some mild side effects so no dose increase  -continue lantus to 80 u daily   -continue Invokana 300 mg daily, metformin 1000 mg BID  -continue to work on diet  -resume dexcom use. Transmitter sent.   -eye exam should be done yearly  -Labs utd  -Foot exam updated by podiatry (Dr. Shante Leslie) a few weeks ago    There are no Patient Instructions on file for this visit.    Follow up: Return in about 4 months (around 6/26/2024).      Jo-Ann Clark PA-C

## 2024-02-27 DIAGNOSIS — E11.649 CONTROLLED TYPE 2 DIABETES MELLITUS WITH HYPOGLYCEMIA, WITH LONG-TERM CURRENT USE OF INSULIN: Chronic | ICD-10-CM

## 2024-02-27 DIAGNOSIS — Z79.4 CONTROLLED TYPE 2 DIABETES MELLITUS WITH HYPOGLYCEMIA, WITH LONG-TERM CURRENT USE OF INSULIN: Chronic | ICD-10-CM

## 2024-02-27 DIAGNOSIS — E11.42 TYPE 2 DIABETES, CONTROLLED, WITH PERIPHERAL NEUROPATHY: Chronic | ICD-10-CM

## 2024-02-27 LAB
ALBUMIN UR-MCNC: <1.2 MG/DL
CREAT UR-MCNC: 36.8 MG/DL
MICROALBUMIN/CREAT UR: NORMAL MG/G{CREAT}

## 2024-02-27 RX ORDER — PROCHLORPERAZINE 25 MG/1
SUPPOSITORY RECTAL
Qty: 3 EACH | Refills: 5 | Status: SHIPPED | OUTPATIENT
Start: 2024-02-27

## 2024-02-27 RX ORDER — TIRZEPATIDE 10 MG/.5ML
10 INJECTION, SOLUTION SUBCUTANEOUS WEEKLY
Qty: 6 ML | Refills: 1 | Status: SHIPPED | OUTPATIENT
Start: 2024-02-27

## 2024-02-27 RX ORDER — INSULIN GLARGINE 100 [IU]/ML
80 INJECTION, SOLUTION SUBCUTANEOUS NIGHTLY
Qty: 72 ML | Refills: 1 | Status: SHIPPED | OUTPATIENT
Start: 2024-02-27

## 2024-03-12 DIAGNOSIS — E11.42 TYPE 2 DIABETES, CONTROLLED, WITH PERIPHERAL NEUROPATHY: Chronic | ICD-10-CM

## 2024-03-12 NOTE — TELEPHONE ENCOUNTER
"    Caller: Raul Welch Keenan \"Earnest\"    Relationship: Self    Best call back number: [phone]     Requested Prescriptions:   Requested Prescriptions     Pending Prescriptions Disp Refills    Continuous Blood Gluc Transmit (Dexcom G6 Transmitter) misc 1 each 3     Sig: Use 1 each Every 3 (Three) Months.    Mounjaro 10 MG/0.5ML solution pen-injector pen 6 mL 1     Sig: Inject 0.5 mL under the skin into the appropriate area as directed 1 (One) Time Per Week.        Pharmacy where request should be sent:      [id number] [place], [place] - [address] - [phone]  - [phone] FX       Last office visit with prescribing clinician: 2/26/2024   Last telemedicine visit with prescribing clinician: Visit date not found   Next office visit with prescribing clinician: 7/29/2024     Additional details provided by patient: PHARMACY STATES THEY DONT HAVE THESE, PLEASE ADVISE.     Does the patient have less than a 3 day supply:  [x] Yes  [] No    Would you like a call back once the refill request has been completed: [] Yes [x] No    If the office needs to give you a call back, can they leave a voicemail: [] Yes [x] No    Pan Arellano Rep   03/12/24 15:19 EDT       "

## 2024-03-13 RX ORDER — PROCHLORPERAZINE 25 MG/1
1 SUPPOSITORY RECTAL
Qty: 1 EACH | Refills: 3 | Status: SHIPPED | OUTPATIENT
Start: 2024-03-13

## 2024-03-13 RX ORDER — TIRZEPATIDE 10 MG/.5ML
10 INJECTION, SOLUTION SUBCUTANEOUS WEEKLY
Qty: 6 ML | Refills: 1 | Status: SHIPPED | OUTPATIENT
Start: 2024-03-13

## 2024-03-26 ENCOUNTER — TELEPHONE (OUTPATIENT)
Dept: ENDOCRINOLOGY | Facility: CLINIC | Age: 49
End: 2024-03-26
Payer: COMMERCIAL

## 2024-03-26 DIAGNOSIS — E11.42 TYPE 2 DIABETES, CONTROLLED, WITH PERIPHERAL NEUROPATHY: Chronic | ICD-10-CM

## 2024-03-26 NOTE — TELEPHONE ENCOUNTER
Spoke with patient and he voiced understanding. Stated that he does not need the transmitters sent in. His transmitter is just not working. He was advised to call Dexcom support team and they should help him out. He voiced understanding.

## 2024-03-26 NOTE — TELEPHONE ENCOUNTER
PT CALLED TO SAY THAT HE'S DEXCOM G 6 TRANSMITTER STOPPED WORKING CAN WE CALL THIS IN FOR HIM   ALSO HIS MOUNJARO IS ON BACK ORDER AND HER STILL HAS SOME TRULICITY LEFT CAN HE TAKES THIS FOR NOW?  PT USES [facility] IN [place]    PTS NUMBER  [id number]

## 2024-03-26 NOTE — TELEPHONE ENCOUNTER
Rx for dexcom transmitter was sent 3/13/24 with 3 refills.   Yes, can use Trulicity until Mounjaro is available again.

## 2024-04-07 DIAGNOSIS — E11.65 UNCONTROLLED TYPE 2 DIABETES MELLITUS WITH HYPERGLYCEMIA: Chronic | ICD-10-CM

## 2024-04-07 DIAGNOSIS — E78.2 MIXED HYPERLIPIDEMIA: Chronic | ICD-10-CM

## 2024-04-08 RX ORDER — CANAGLIFLOZIN 300 MG/1
TABLET, FILM COATED ORAL
Qty: 90 TABLET | Refills: 1 | Status: SHIPPED | OUTPATIENT
Start: 2024-04-08

## 2024-04-08 RX ORDER — ICOSAPENT ETHYL 1000 MG/1
2 CAPSULE ORAL 2 TIMES DAILY WITH MEALS
Qty: 360 CAPSULE | Refills: 1 | Status: SHIPPED | OUTPATIENT
Start: 2024-04-08

## 2024-04-08 NOTE — TELEPHONE ENCOUNTER
Rx Refill Note  Requested Prescriptions     Pending Prescriptions Disp Refills    icosapent ethyl (VASCEPA) 1 g capsule capsule [Pharmacy Med Name: Icosapent Ethyl Oral Capsule 1 GM] 360 capsule 0     Sig: TAKE 2 CAPSULES BY MOUTH 2 TIMES A DAY WITH MEALS    Invokana 300 MG tablet tablet [Pharmacy Med Name: Invokana Oral Tablet 300 MG] 90 tablet 0     Sig: TAKE 1 TABLET BY MOUTH EVERY DAY      Last office visit with prescribing clinician: 2/26/2024      Next office visit with prescribing clinician: 7/29/2024                  Kermit Stokes MA  04/08/24, 08:37 EDT

## 2024-04-09 ENCOUNTER — PRIOR AUTHORIZATION (OUTPATIENT)
Dept: ENDOCRINOLOGY | Facility: CLINIC | Age: 49
End: 2024-04-09
Payer: COMMERCIAL

## 2024-06-14 ENCOUNTER — TRANSCRIBE ORDERS (OUTPATIENT)
Dept: ADMINISTRATIVE | Facility: HOSPITAL | Age: 49
End: 2024-06-14
Payer: COMMERCIAL

## 2024-06-14 DIAGNOSIS — J30.9 ALLERGIC RHINITIS, UNSPECIFIED SEASONALITY, UNSPECIFIED TRIGGER: ICD-10-CM

## 2024-06-14 DIAGNOSIS — G43.009 MIGRAINE WITHOUT AURA AND WITHOUT STATUS MIGRAINOSUS, NOT INTRACTABLE: Primary | ICD-10-CM

## 2024-06-14 DIAGNOSIS — E11.9 DIABETES MELLITUS WITHOUT COMPLICATION: ICD-10-CM

## 2024-07-29 ENCOUNTER — OFFICE VISIT (OUTPATIENT)
Dept: ENDOCRINOLOGY | Facility: CLINIC | Age: 49
End: 2024-07-29
Payer: COMMERCIAL

## 2024-07-29 VITALS
OXYGEN SATURATION: 97 % | DIASTOLIC BLOOD PRESSURE: 80 MMHG | BODY MASS INDEX: 38.36 KG/M2 | WEIGHT: 315 LBS | HEIGHT: 76 IN | SYSTOLIC BLOOD PRESSURE: 120 MMHG | HEART RATE: 83 BPM

## 2024-07-29 DIAGNOSIS — E11.65 TYPE 2 DIABETES MELLITUS WITH HYPERGLYCEMIA, WITH LONG-TERM CURRENT USE OF INSULIN: Primary | Chronic | ICD-10-CM

## 2024-07-29 DIAGNOSIS — E78.2 MIXED HYPERLIPIDEMIA: Chronic | ICD-10-CM

## 2024-07-29 DIAGNOSIS — Z79.4 TYPE 2 DIABETES MELLITUS WITH HYPERGLYCEMIA, WITH LONG-TERM CURRENT USE OF INSULIN: Primary | Chronic | ICD-10-CM

## 2024-07-29 LAB
EXPIRATION DATE: ABNORMAL
EXPIRATION DATE: ABNORMAL
GLUCOSE BLDC GLUCOMTR-MCNC: 134 MG/DL (ref 70–130)
HBA1C MFR BLD: 7 % (ref 4.5–5.7)
Lab: ABNORMAL
Lab: ABNORMAL

## 2024-07-29 PROCEDURE — 3079F DIAST BP 80-89 MM HG: CPT | Performed by: PHYSICIAN ASSISTANT

## 2024-07-29 PROCEDURE — 83036 HEMOGLOBIN GLYCOSYLATED A1C: CPT | Performed by: PHYSICIAN ASSISTANT

## 2024-07-29 PROCEDURE — 99214 OFFICE O/P EST MOD 30 MIN: CPT | Performed by: PHYSICIAN ASSISTANT

## 2024-07-29 PROCEDURE — 1160F RVW MEDS BY RX/DR IN RCRD: CPT | Performed by: PHYSICIAN ASSISTANT

## 2024-07-29 PROCEDURE — 95251 CONT GLUC MNTR ANALYSIS I&R: CPT | Performed by: PHYSICIAN ASSISTANT

## 2024-07-29 PROCEDURE — 3051F HG A1C>EQUAL 7.0%<8.0%: CPT | Performed by: PHYSICIAN ASSISTANT

## 2024-07-29 PROCEDURE — 3074F SYST BP LT 130 MM HG: CPT | Performed by: PHYSICIAN ASSISTANT

## 2024-07-29 PROCEDURE — 1159F MED LIST DOCD IN RCRD: CPT | Performed by: PHYSICIAN ASSISTANT

## 2024-07-29 RX ORDER — ICOSAPENT ETHYL 1 G/1
2 CAPSULE ORAL 2 TIMES DAILY WITH MEALS
Qty: 360 CAPSULE | Refills: 1 | Status: SHIPPED | OUTPATIENT
Start: 2024-07-29

## 2024-07-29 RX ORDER — INSULIN GLARGINE 100 [IU]/ML
80 INJECTION, SOLUTION SUBCUTANEOUS NIGHTLY
Qty: 72 ML | Refills: 1 | Status: SHIPPED | OUTPATIENT
Start: 2024-07-29

## 2024-07-29 RX ORDER — CANAGLIFLOZIN 300 MG/1
1 TABLET, FILM COATED ORAL DAILY
Qty: 90 TABLET | Refills: 1 | Status: SHIPPED | OUTPATIENT
Start: 2024-07-29

## 2024-07-29 NOTE — PROGRESS NOTES
Chief Complaint  F/u for Diabetes Mellitus.    HPI   Raul Welch is a 48 y.o. male who is here today for f/u of Diabetes Mellitus type 2. The initial diagnosis of diabetes was made in his 20s.     HAs - MRI of head was ok, referred to neuro, no galactorrhea  Reports more food excursions recently    Diabetic complications: peripheral neuropathy s/p partial second left toe amputation  Eye exam current (within one year): [employer], no retinopathy    Current diabetic medications include:  Metformin 1000mg BID  Invokana 300 mg daily  Mounjaro 10 mg weekly - tolerating well now  Lantus 80 u qhs    Statin: lipitor 40, changed from zocor 20 6/2020, vascepa started 6/2020    Past medications: ozempic 0.5 mg (changed to trulicity due to insurance preference), basaglar, Steglatro, glyburide    Diabetic Monitoring  -   Dexcom G6 readings reviewed (7/16/2024 to 7/29/2024): 64% time in range, fasting blood sugars range from 120-170, postprandial hyperglycemia in the 200s, no hypoglycemia    Freestyle Jose was expensive.     The following portions of the patient's history were reviewed and updated by me as appropriate: allergies, current medications, past family history, past social history, past surgical history and problem list.      Past Medical History:   Diagnosis Date    Arthritis     Asthma     SPOUSE REPORTS ISSUES AS A CHILD    Back pain     Bell's palsy     Blood in stool     Depression     Diabetes     Diarrhea     WITH ABDOMINAL CRAMPING    Diverticulitis     Elevated cholesterol     GERD (gastroesophageal reflux disease)     Headache     High cholesterol     History of being tatooed     History of bronchitis     History of fracture     HX OF FOOT (SPOUSE UNSURE LATERALITY)    History of gout     Hypertension     Myocardial infarction 1997    Sleep apnea     USES BIPAP HS - TO BRING IN THE DOS    Tattoos     Type 2 diabetes mellitus     Vitamin D deficiency 05/30/2023    Wears glasses         Medications    Current Outpatient Medications:     atorvastatin (LIPITOR) 40 MG tablet, Take 1 tablet by mouth Daily., Disp: , Rfl:     B-D UF III MINI PEN NEEDLES 31G X 5 MM misc, USE ONCE DAILY WITH INSULIN INJECTIONS AS DIRECTED, Disp: , Rfl: 11    Blood Glucose Monitoring Suppl device, Use as directed to check blood sugar, Disp: 1 each, Rfl: 0    Canagliflozin (Invokana) 300 MG tablet tablet, Take 1 tablet by mouth Daily., Disp: 90 tablet, Rfl: 1    carvedilol (COREG) 12.5 MG tablet, Take 1 tablet by mouth Every 12 (Twelve) Hours., Disp: , Rfl:     cetirizine (ZyrTEC) 10 MG tablet, Take 12 mg by mouth Daily., Disp: , Rfl:     citalopram (CeleXA) 20 MG tablet, Take 1 tablet by mouth Daily., Disp: , Rfl:     Continuous Blood Gluc  (Dexcom G6 ) device, 1 each Continuous., Disp: 1 each, Rfl: 0    Continuous Blood Gluc Sensor (Dexcom G6 Sensor), Use Every 10 (Ten) Days., Disp: 3 each, Rfl: 5    Continuous Blood Gluc Transmit (Dexcom G6 Transmitter) misc, Use 1 each Every 3 (Three) Months., Disp: 1 each, Rfl: 3    glucose blood (OneTouch Ultra) test strip, Test Three times daily, Disp: 100 each, Rfl: 3    hydrochlorothiazide (HYDRODIURIL) 25 MG tablet, Take 1 tablet by mouth Daily., Disp: , Rfl:     icosapent ethyl (VASCEPA) 1 g capsule capsule, Take 2 g by mouth 2 (Two) Times a Day With Meals. Put on hold for now, Disp: 360 capsule, Rfl: 1    Insulin Pen Needle 32G X 6 MM misc, Use daily with insulin, Disp: 100 each, Rfl: 11    Lantus SoloStar 100 UNIT/ML injection pen, Inject 80 Units under the skin into the appropriate area as directed Every Night. Put on hold for now, Disp: 72 mL, Rfl: 1    lisinopril (PRINIVIL,ZESTRIL) 40 MG tablet, Take 1 tablet by mouth Daily., Disp: , Rfl:     meclizine (ANTIVERT) 25 MG tablet, Take 1 tablet by mouth 3 (Three) Times a Day As Needed for Dizziness., Disp: 20 tablet, Rfl: 2    metFORMIN (GLUCOPHAGE) 1000 MG tablet, Take 1 tablet by mouth 2 (Two) Times a Day  "With Meals., Disp: 180 tablet, Rfl: 1    Omega 3 1200 MG capsule, Take 1,000 mg by mouth 2 (Two) Times a Day., Disp: , Rfl:     pantoprazole (PROTONIX) 20 MG EC tablet, TAKE 1 TABLET BY MOUTH EVERY DAY (Patient taking differently: As Needed.), Disp: 30 tablet, Rfl: 0    vitamin D (ERGOCALCIFEROL) 1.25 MG (89511 UT) capsule capsule, Take 1 capsule by mouth 1 (One) Time Per Week., Disp: 13 capsule, Rfl: 1    Tirzepatide (MOUNJARO) 12.5 MG/0.5ML solution pen-injector pen, Inject 0.5 mL under the skin into the appropriate area as directed 1 (One) Time Per Week., Disp: 6 mL, Rfl: 1    Review of Systems  Review of Systems   All other systems reviewed and are negative.       Physical Exam    /80   Pulse 83   Ht 193 cm (76\")   Wt (!) 154 kg (340 lb)   SpO2 97%   BMI 41.39 kg/m² Body mass index is 41.39 kg/m².  Physical Exam   Constitutional: He is oriented to person, place, and time. He appears well-developed. No distress.   HENT:   Head: Normocephalic.   Right Ear: External ear normal.   Left Ear: External ear normal.   Nose: Nose normal.   Eyes: Conjunctivae are normal. Right eye exhibits no discharge. Left eye exhibits no discharge. No scleral icterus.   Neck: No JVD present. No tracheal deviation present. No thyromegaly present.   Cardiovascular: Normal rate, regular rhythm and normal heart sounds.   No murmur heard.  Pulmonary/Chest: Effort normal and breath sounds normal. No respiratory distress. He has no wheezes.   Musculoskeletal: No tenderness.   Neurological: He is alert and oriented to person, place, and time.   Skin: Skin is warm and dry. No rash noted. He is not diaphoretic. No erythema.   Psychiatric: His behavior is normal. Judgment and thought content normal.       Labs and Imaging   Lab Results   Component Value Date    HGBA1C 7.0 (A) 07/29/2024    HGBA1C 7.2 (A) 02/26/2024    HGBA1C 8.0 10/03/2023     Office Visit on 07/29/2024   Component Date Value Ref Range Status    Hemoglobin A1C " 07/29/2024 7.0 (A)  4.5 - 5.7 % Final    Lot Number 07/29/2024 [id number]   Final    Expiration Date 07/29/2024 02-   Final    Glucose 07/29/2024 134 (A)  70 - 130 mg/dL Final    Lot Number 07/29/2024 [id number]   Final    Expiration Date 07/29/2024 12-   Final     External labs from 6/26/2024 reviewed: PTH 16, serum calcium 9.6, GFR 84, AST 21, ALT 27    Assessment / Plan   Diagnoses and all orders for this visit:    1. Type 2 diabetes mellitus with hyperglycemia, with long-term current use of insulin (Primary)  -     POC Glycosylated Hemoglobin (Hb A1C)  -     POC Glucose, Blood  -     Tirzepatide (MOUNJARO) 12.5 MG/0.5ML solution pen-injector pen; Inject 0.5 mL under the skin into the appropriate area as directed 1 (One) Time Per Week.  Dispense: 6 mL; Refill: 1  -     metFORMIN (GLUCOPHAGE) 1000 MG tablet; Take 1 tablet by mouth 2 (Two) Times a Day With Meals.  Dispense: 180 tablet; Refill: 1  -     Lantus SoloStar 100 UNIT/ML injection pen; Inject 80 Units under the skin into the appropriate area as directed Every Night. Put on hold for now  Dispense: 72 mL; Refill: 1  -     Canagliflozin (Invokana) 300 MG tablet tablet; Take 1 tablet by mouth Daily.  Dispense: 90 tablet; Refill: 1    2. Mixed hyperlipidemia  -     icosapent ethyl (VASCEPA) 1 g capsule capsule; Take 2 g by mouth 2 (Two) Times a Day With Meals. Put on hold for now  Dispense: 360 capsule; Refill: 1        Diabetes Mellitus 2 is under fair control  -A1c 7  -64% time in range  -increase Mounjaro to 12.5 mg weekly  -continue lantus to 80 u daily   -continue Invokana 300 mg daily, metformin 1000 mg BID  -continue to work on diet  -eye exam should be done yearly  -Labs updated with pcp in the last few weeks  -Foot exam updated by podiatry (Dr. Shante Leslie) regularly - saw 2 weeks ago    There are no Patient Instructions on file for this visit.    Follow up: Return in about 4 months (around 11/29/2024).      Jo-Ann Clark PA-C

## 2024-09-30 DIAGNOSIS — E11.42 TYPE 2 DIABETES, CONTROLLED, WITH PERIPHERAL NEUROPATHY: Chronic | ICD-10-CM

## 2024-09-30 RX ORDER — TIRZEPATIDE 10 MG/.5ML
INJECTION, SOLUTION SUBCUTANEOUS
Qty: 6 ML | Refills: 0 | OUTPATIENT
Start: 2024-09-30

## 2024-09-30 NOTE — TELEPHONE ENCOUNTER
Rx Refill Note    Requested Prescriptions     Pending Prescriptions Disp Refills    Mounjaro 10 MG/0.5ML solution pen-injector pen [Pharmacy Med Name: Mounjaro Subcutaneous Solution Pen-injector 10 MG/0.5ML] 6 mL 0     Sig: INJECT 10 MG (0.5ml) UNDER THE SKIN INTO THE APPROPRIATE AREA AS DIRECTED ONE TIME PER WEEK        Last office visit with prescribing clinician: 7/29/2024       Next office visit with prescribing clinician: 12/18/2024

## 2024-10-15 DIAGNOSIS — E11.42 TYPE 2 DIABETES, CONTROLLED, WITH PERIPHERAL NEUROPATHY: Chronic | ICD-10-CM

## 2024-10-15 RX ORDER — TIRZEPATIDE 10 MG/.5ML
INJECTION, SOLUTION SUBCUTANEOUS
Qty: 6 ML | Refills: 0 | OUTPATIENT
Start: 2024-10-15

## 2024-10-15 NOTE — TELEPHONE ENCOUNTER
Rx Refill Note    Requested Prescriptions     Pending Prescriptions Disp Refills    Mounjaro 10 MG/0.5ML solution pen-injector pen [Pharmacy Med Name: Mounjaro Subcutaneous Solution Auto-injector 10 MG/0.5ML] 6 mL 0     Sig: INJECT 10 MG (0.5ml) UNDER THE SKIN INTO THE APPROPRIATE AREA AS DIRECTED ONE TIME PER WEEK        Last office visit with prescribing clinician: 7/29/2024         Next office visit with prescribing clinician: 12/18/2024

## 2024-10-30 ENCOUNTER — PRIOR AUTHORIZATION (OUTPATIENT)
Dept: ENDOCRINOLOGY | Facility: CLINIC | Age: 49
End: 2024-10-30
Payer: COMMERCIAL

## 2024-10-30 NOTE — TELEPHONE ENCOUNTER
Raul Welch (Key: [id number])  PA Case ID #: [id number]  Rx #: [id number]  Need Help? Call us at [phone]  Outcome  Approved today by [place] Medicaid Med[employer]act 2017  The request has been approved. The authorization is effective from 10/30/2024 to 04/29/2025, as long as the member is enrolled in their current health plan. The request was approved as submitted. A written notification letter will follow with additional details.  Authorization Expiration Date: 4/28/2025  Drug  Mounjaro 12.5MG/0.5ML auto-injectors  ePA cloud logo  Form  Med[employer] [place] Medicaid ePA Form 2017 [id number]      The authorization is effective from 10/30/2024 to 04/29/2025, as long as you are enrolled as a member of your current health plan. The request was approved as submitted.

## 2024-11-04 ENCOUNTER — OFFICE VISIT (OUTPATIENT)
Dept: NEUROLOGY | Facility: CLINIC | Age: 49
End: 2024-11-04
Payer: COMMERCIAL

## 2024-11-04 VITALS
BODY MASS INDEX: 38.36 KG/M2 | DIASTOLIC BLOOD PRESSURE: 74 MMHG | SYSTOLIC BLOOD PRESSURE: 120 MMHG | HEART RATE: 83 BPM | HEIGHT: 76 IN | TEMPERATURE: 96.7 F | OXYGEN SATURATION: 96 % | WEIGHT: 315 LBS

## 2024-11-04 DIAGNOSIS — R51.9 ACUTE INTRACTABLE HEADACHE, UNSPECIFIED HEADACHE TYPE: ICD-10-CM

## 2024-11-04 DIAGNOSIS — R26.89 IMBALANCE: ICD-10-CM

## 2024-11-04 DIAGNOSIS — R41.89 SUBJECTIVE MEMORY COMPLAINTS: Primary | ICD-10-CM

## 2024-11-04 DIAGNOSIS — G47.33 OSA TREATED WITH BIPAP: ICD-10-CM

## 2024-11-04 DIAGNOSIS — R40.4 TRANSIENT ALTERATION OF AWARENESS: ICD-10-CM

## 2024-11-04 PROCEDURE — 3078F DIAST BP <80 MM HG: CPT | Performed by: NURSE PRACTITIONER

## 2024-11-04 PROCEDURE — 99215 OFFICE O/P EST HI 40 MIN: CPT | Performed by: NURSE PRACTITIONER

## 2024-11-04 PROCEDURE — 3074F SYST BP LT 130 MM HG: CPT | Performed by: NURSE PRACTITIONER

## 2024-11-04 PROCEDURE — 1160F RVW MEDS BY RX/DR IN RCRD: CPT | Performed by: NURSE PRACTITIONER

## 2024-11-04 PROCEDURE — 1159F MED LIST DOCD IN RCRD: CPT | Performed by: NURSE PRACTITIONER

## 2024-11-04 RX ORDER — ZAVEGEPANT 10 MG/.1ML
10 SPRAY NASAL AS NEEDED
Qty: 1 EACH | Refills: 0 | COMMUNITY
Start: 2024-11-04

## 2024-11-04 RX ORDER — OMEPRAZOLE 40 MG/1
40 CAPSULE, DELAYED RELEASE ORAL DAILY
COMMUNITY

## 2024-11-04 RX ORDER — NORTRIPTYLINE HYDROCHLORIDE 10 MG/1
10 CAPSULE ORAL NIGHTLY
Qty: 90 CAPSULE | Refills: 1 | Status: SHIPPED | OUTPATIENT
Start: 2024-11-04

## 2024-11-04 NOTE — PROGRESS NOTES
"     Follow Up Office Visit      Patient Name: Raul Welch  : 1975   MRN: [id number]     Chief Complaint:    Chief Complaint   Patient presents with   • Follow-up     Patient in office to follow up on headaches and GONZÁLEZ. Patient stated his headaches have been worse, and trouble with word finding and stated he knows what he wants to say but can't get the words to come out and feels aggravated after.      • Memory Loss     Patient stated he has been having memory issues, stated they have just recently getting worse. More short term memory issues.          History of Present Illness: Raul Welch is a 49 y.o. male who is here today to follow up with headaches, GONZÁLEZ and new memory concerns.  He is accompanied by his wife, Sofiya, who attends via cell phone.  Currently on AutoBipap max IPAP 25cm and min EPAP 15cm, compliance 96%, AHI 1/hour.  He is tolerating his pressures well.  He is using a nasal mask.  He is using [employer] [facility].   *Current compliance report reviewed and scanned into EMR.     Poor balance- says he continues to have poor balance \"And I've had that for a long time\"- previously diagnosed with vertigo.     Headaches- He is having a headache \"At least 2-3 times a day\".  He is taking OTC Ibuprofen PRN.  He has had 30/30 headache days in the past month, with most being described as severe.  Headache described as frontal, aching, not lasting 4 hours (his wife disagrees and says they last 6 hours), not accompanied by photophobia, nausea or photophobia.  He has a severe headache today.     *MRI brain with and without contrast, on 2024, was normal.   *Previously tried Topiramate without improvement.     Memory complaints- He often asks the same questions during a conversation, per his wife.  His wife sends him to the store with a grocery list and he doesn't get the things on the list.  He substitutes words for other words that don't seem to make sense- he gets aggravated after these episodes.  " "He is working part time- 4-5 hours 7 days a week.  He lives with his wife and 3 sons.  He performs all of his ADLs independently.  He drives and says he has gotten lost and states, \"I have been out driving and all the sudden I don't know how I got there\".  He says he has to tell himself to hit the gas or brake and it should be automatic to him as long as he has been driving.  His wife and son pay the household bills and says his wife has always done that.  He states, \"I have never been good with money\".  He got 29/30 on the MMSE today.  His wife does not feel his depression is well-controlled right now but he disagrees.  At the end of the visit he states, \"I'll be honest, I've already forgot most of what we've talked about\".      Following taken from previous visit on 11/21/2022:  History of Present Illness: Raul Welch is a 47 y.o. male who is here today to follow up and was last seen on 11/8/2022.  He is taking Topiramate 50mg BID and he feels that has not helped with his headaches but has made him \"feel bad\".  He continues to have \"random headaches throughout the day\" but nothing severe.  Nothing seems to make the headaches better or worse.  He says his memory is really bad and he asks if he could be \"heading towards dementia\".   *CT head without contrast was unremarkable on 11/8/2022.         Following taken from previous visit note:  Raul Welch is a 47 y.o. male who is here today to follow up with GONZÁLEZ and was last seen in November 2021.  Currently on AutoBipap max IPAP 25cm and min EPAP 15cm, PS 2-8.  He is tolerating his pressures well.  He does feel no matter how tight he gets his nasal mask, it continues to leak and not seal well.  He states, \"I feel so sleepy all the time\".  He complains of having frequent headaches and feels they are getting worse.  He has been having these headaches for about 3 weeks.  He also complains of having episodes of confusion and states, \"Right now it's hard for me " "to concentrate on what you are saying\"- this has been going on for about 3 weeks.  He has had 21/21 headache days in the past 3 weeks, describes headaches as pressure to the top of his head, aching, accompanied by nausea, lasting less than 4 hours.  His blood pressure is elevated today but he says he has been checking his blood pressure and it hasn't been elevated at home.  He denies any history of head injury.  He denies any history of migraine disorder.  Additional risk factors- BMI , dyslipidemia, diabetes, HTN, GERD.   *Current compliance report requested for review from [employer].   *Recent labs reviewed.   *Normal EEG on 6/22/2021.     Subjective      Review of Systems:   Review of Systems   Neurological:  Positive for dizziness, headache and memory problem.   Psychiatric/Behavioral:  Negative for dysphoric mood.        I have reviewed and the following portions of the patient's history were updated as appropriate: past family history, past medical history, past social history, past surgical history and problem list.    Medications:     Current Outpatient Medications:   •  atorvastatin (LIPITOR) 40 MG tablet, Take 1 tablet by mouth Daily., Disp: , Rfl:   •  B-D UF III MINI PEN NEEDLES 31G X 5 MM misc, USE ONCE DAILY WITH INSULIN INJECTIONS AS DIRECTED, Disp: , Rfl: 11  •  Blood Glucose Monitoring Suppl device, Use as directed to check blood sugar, Disp: 1 each, Rfl: 0  •  Canagliflozin (Invokana) 300 MG tablet tablet, Take 1 tablet by mouth Daily., Disp: 90 tablet, Rfl: 1  •  carvedilol (COREG) 12.5 MG tablet, Take 1 tablet by mouth Every 12 (Twelve) Hours., Disp: , Rfl:   •  cetirizine (ZyrTEC) 10 MG tablet, Take 12 mg by mouth Daily., Disp: , Rfl:   •  citalopram (CeleXA) 20 MG tablet, Take 1 tablet by mouth Daily., Disp: , Rfl:   •  Continuous Blood Gluc  (Dexcom G6 ) device, 1 each Continuous., Disp: 1 each, Rfl: 0  •  Continuous Blood Gluc Sensor (Dexcom G6 Sensor), Use Every 10 (Ten) " Days., Disp: 3 each, Rfl: 5  •  Continuous Blood Gluc Transmit (Dexcom G6 Transmitter) misc, Use 1 each Every 3 (Three) Months., Disp: 1 each, Rfl: 3  •  glucose blood (OneTouch Ultra) test strip, Test Three times daily, Disp: 100 each, Rfl: 3  •  hydrochlorothiazide (HYDRODIURIL) 25 MG tablet, Take 1 tablet by mouth Daily., Disp: , Rfl:   •  icosapent ethyl (VASCEPA) 1 g capsule capsule, Take 2 g by mouth 2 (Two) Times a Day With Meals. Put on hold for now, Disp: 360 capsule, Rfl: 1  •  Insulin Pen Needle 32G X 6 MM misc, Use daily with insulin, Disp: 100 each, Rfl: 11  •  Lantus SoloStar 100 UNIT/ML injection pen, Inject 80 Units under the skin into the appropriate area as directed Every Night. Put on hold for now, Disp: 72 mL, Rfl: 1  •  lisinopril (PRINIVIL,ZESTRIL) 40 MG tablet, Take 1 tablet by mouth Daily., Disp: , Rfl:   •  meclizine (ANTIVERT) 25 MG tablet, Take 1 tablet by mouth 3 (Three) Times a Day As Needed for Dizziness., Disp: 20 tablet, Rfl: 2  •  metFORMIN (GLUCOPHAGE) 1000 MG tablet, Take 1 tablet by mouth 2 (Two) Times a Day With Meals., Disp: 180 tablet, Rfl: 1  •  Omega 3 1200 MG capsule, Take 1,000 mg by mouth 2 (Two) Times a Day., Disp: , Rfl:   •  omeprazole (priLOSEC) 40 MG capsule, Take 1 capsule by mouth Daily., Disp: , Rfl:   •  pantoprazole (PROTONIX) 20 MG EC tablet, TAKE 1 TABLET BY MOUTH EVERY DAY (Patient taking differently: As Needed.), Disp: 30 tablet, Rfl: 0  •  Tirzepatide (MOUNJARO) 12.5 MG/0.5ML solution pen-injector pen, Inject 0.5 mL under the skin into the appropriate area as directed 1 (One) Time Per Week., Disp: 6 mL, Rfl: 1  •  vitamin D (ERGOCALCIFEROL) 1.25 MG (49625 UT) capsule capsule, Take 1 capsule by mouth 1 (One) Time Per Week., Disp: 13 capsule, Rfl: 1  •  nortriptyline (Pamelor) 10 MG capsule, Take 1 capsule by mouth Every Night., Disp: 90 capsule, Rfl: 1  •  Zavegepant HCl (Zavzpret) 10 MG/ACT solution, Administer 10 mg into the nostril(s) as directed by  "provider As Needed (HA)., Disp: 1 each, Rfl: 0    Allergies:   No Known Allergies    Objective     Physical Exam:  Vital Signs:   Vitals:    11/04/24 1517   BP: 120/74   BP Location: Right arm   Patient Position: Sitting   Cuff Size: Adult   Pulse: 83   Temp: 96.7 °F (35.9 °C)   SpO2: 96%   Weight: (!) 152 kg (334 lb)   Height: 193 cm (76\")   PainSc: 0-No pain     Body mass index is 40.66 kg/m².    Physical Exam  Vitals and nursing note reviewed.   Constitutional:       General: He is not in acute distress.     Appearance: Normal appearance. He is well-developed. He is obese. He is not diaphoretic.   HENT:      Head: Normocephalic and atraumatic.   Eyes:      Extraocular Movements: Extraocular movements intact.      Conjunctiva/sclera: Conjunctivae normal.   Pulmonary:      Effort: Pulmonary effort is normal. No respiratory distress.   Musculoskeletal:         General: Normal range of motion.   Skin:     General: Skin is warm and dry.   Neurological:      Mental Status: He is oriented to person, place, and time.   Psychiatric:         Mood and Affect: Mood normal.         Speech: Speech normal.         Behavior: Behavior normal.         Thought Content: Thought content normal.         Judgment: Judgment normal.         Neurological Exam  Mental Status  Awake, alert and oriented to person, place and time. Oriented to person, place, and time. Recalls 3 of 3 objects immediately. At 3 minutes recalls 3 of 3 objects. Unable to copy figure. Speech is normal. Language is fluent with no aphasia. Able to spell words backwards. Fund of knowledge is appropriate for level of education. Apraxia absent. MMSE score: 29.    Cranial Nerves  CN III, IV, VI: Extraocular movements intact bilaterally.    Gait  Casual gait is normal including stance, stride, and arm swing. Able to rise from chair without using arms.        Assessment / Plan      Assessment/Plan:   Diagnoses and all orders for this visit:    1. Subjective memory " complaints (Primary)  -     Ambulatory Referral to Psychology    2. GONZÁLEZ treated with BiPAP    3. Acute intractable headache, unspecified headache type  -     nortriptyline (Pamelor) 10 MG capsule; Take 1 capsule by mouth Every Night.  Dispense: 90 capsule; Refill: 1    4. Transient alteration of awareness  -     EEG; Future    5. Imbalance    *Indications and possible SEs of Zavzpret discussed with patient and dose (10mg) self-administered in the office today. Verbalizes some relief of severe headache before leaving office today.  *Referral to Lissy Ceron for neuropsychological memory testing. If indicative of neurocognitive disorder, will refer to [facility] if patient is agreeable.   *Patient education on Mild cognitive disorder and Dementia provided today.   *Offered PT for Vertigo but he isn't agreeable to that today.   *Indications and possible SEs of Nortriptyline discussed with patient.   *EEG.     *I spent 45 minutes on this visit including but not limited to- prior documentation and diagnostic review, interview, assessment, providing patient education on memory disorders and neuropsychiatric memory testing, providing patient education on headaches, providing anticipatory guidance, , documentation.     Follow Up:   Return in about 3 months (around 2/4/2025) for Follow Up.    HUMPHREY Michele, FNP-C  [facility]

## 2024-11-19 ENCOUNTER — HOSPITAL ENCOUNTER (OUTPATIENT)
Dept: SLEEP MEDICINE | Facility: HOSPITAL | Age: 49
Discharge: HOME | End: 2024-11-19
Admitting: NURSE PRACTITIONER
Payer: COMMERCIAL

## 2024-11-19 DIAGNOSIS — R40.4 TRANSIENT ALTERATION OF AWARENESS: ICD-10-CM

## 2024-11-19 PROCEDURE — 95816 EEG AWAKE AND DROWSY: CPT

## 2024-11-19 PROCEDURE — 95816 EEG AWAKE AND DROWSY: CPT | Performed by: PSYCHIATRY & NEUROLOGY

## 2024-12-19 ENCOUNTER — OFFICE VISIT (OUTPATIENT)
Dept: ENDOCRINOLOGY | Facility: CLINIC | Age: 49
End: 2024-12-19
Payer: COMMERCIAL

## 2024-12-19 VITALS
WEIGHT: 315 LBS | DIASTOLIC BLOOD PRESSURE: 80 MMHG | RESPIRATION RATE: 18 BRPM | SYSTOLIC BLOOD PRESSURE: 126 MMHG | HEIGHT: 76 IN | OXYGEN SATURATION: 97 % | BODY MASS INDEX: 38.36 KG/M2 | HEART RATE: 74 BPM

## 2024-12-19 DIAGNOSIS — Z79.4 TYPE 2 DIABETES MELLITUS WITHOUT COMPLICATION, WITH LONG-TERM CURRENT USE OF INSULIN: Primary | ICD-10-CM

## 2024-12-19 DIAGNOSIS — E11.9 TYPE 2 DIABETES MELLITUS WITHOUT COMPLICATION, WITH LONG-TERM CURRENT USE OF INSULIN: Primary | ICD-10-CM

## 2024-12-19 LAB
EXPIRATION DATE: ABNORMAL
EXPIRATION DATE: NORMAL
GLUCOSE BLDC GLUCOMTR-MCNC: 115 MG/DL (ref 70–130)
HBA1C MFR BLD: 6.5 % (ref 4.5–5.7)
Lab: ABNORMAL
Lab: NORMAL

## 2024-12-19 RX ORDER — PROCHLORPERAZINE 25 MG/1
SUPPOSITORY RECTAL
Qty: 3 EACH | Refills: 5 | Status: SHIPPED | OUTPATIENT
Start: 2024-12-19

## 2024-12-19 RX ORDER — INSULIN GLARGINE 100 [IU]/ML
80 INJECTION, SOLUTION SUBCUTANEOUS NIGHTLY
Qty: 72 ML | Refills: 1 | Status: SHIPPED | OUTPATIENT
Start: 2024-12-19

## 2024-12-19 RX ORDER — CANAGLIFLOZIN 300 MG/1
1 TABLET, FILM COATED ORAL DAILY
Qty: 90 TABLET | Refills: 1 | Status: SHIPPED | OUTPATIENT
Start: 2024-12-19

## 2024-12-19 RX ORDER — PROCHLORPERAZINE 25 MG/1
1 SUPPOSITORY RECTAL
Qty: 1 EACH | Refills: 3 | Status: SHIPPED | OUTPATIENT
Start: 2024-12-19

## 2024-12-20 ENCOUNTER — PRIOR AUTHORIZATION (OUTPATIENT)
Dept: ENDOCRINOLOGY | Facility: CLINIC | Age: 49
End: 2024-12-20
Payer: COMMERCIAL

## 2024-12-20 ENCOUNTER — LAB (OUTPATIENT)
Dept: LAB | Facility: HOSPITAL | Age: 49
End: 2024-12-20
Payer: COMMERCIAL

## 2024-12-20 DIAGNOSIS — Z79.4 TYPE 2 DIABETES MELLITUS WITH HYPERGLYCEMIA, WITH LONG-TERM CURRENT USE OF INSULIN: ICD-10-CM

## 2024-12-20 DIAGNOSIS — E11.65 TYPE 2 DIABETES MELLITUS WITH HYPERGLYCEMIA, WITH LONG-TERM CURRENT USE OF INSULIN: ICD-10-CM

## 2024-12-20 LAB
ALBUMIN SERPL-MCNC: 4 G/DL (ref 3.5–5.2)
ALBUMIN UR-MCNC: <1.2 MG/DL
ALBUMIN/GLOB SERPL: 1.2 G/DL
ALP SERPL-CCNC: 102 U/L (ref 39–117)
ALT SERPL W P-5'-P-CCNC: 25 U/L (ref 1–41)
ANION GAP SERPL CALCULATED.3IONS-SCNC: 8.5 MMOL/L (ref 5–15)
AST SERPL-CCNC: 21 U/L (ref 1–40)
BILIRUB SERPL-MCNC: 0.7 MG/DL (ref 0–1.2)
BUN SERPL-MCNC: 19 MG/DL (ref 6–20)
BUN/CREAT SERPL: 18.6 (ref 7–25)
CALCIUM SPEC-SCNC: 9.5 MG/DL (ref 8.6–10.5)
CHLORIDE SERPL-SCNC: 105 MMOL/L (ref 98–107)
CHOLEST SERPL-MCNC: 112 MG/DL (ref 0–200)
CO2 SERPL-SCNC: 25.5 MMOL/L (ref 22–29)
CREAT SERPL-MCNC: 1.02 MG/DL (ref 0.76–1.27)
CREAT UR-MCNC: 134.2 MG/DL
EGFRCR SERPLBLD CKD-EPI 2021: 89 ML/MIN/1.73
GLOBULIN UR ELPH-MCNC: 3.4 GM/DL
GLUCOSE SERPL-MCNC: 125 MG/DL (ref 65–99)
HDLC SERPL-MCNC: 29 MG/DL (ref 40–60)
LDLC SERPL CALC-MCNC: 59 MG/DL (ref 0–100)
LDLC/HDLC SERPL: 1.91 {RATIO}
MICROALBUMIN/CREAT UR: NORMAL MG/G{CREAT}
POTASSIUM SERPL-SCNC: 4.9 MMOL/L (ref 3.5–5.2)
PROT SERPL-MCNC: 7.4 G/DL (ref 6–8.5)
SODIUM SERPL-SCNC: 139 MMOL/L (ref 136–145)
TRIGL SERPL-MCNC: 138 MG/DL (ref 0–150)
TSH SERPL DL<=0.05 MIU/L-ACNC: 0.77 UIU/ML (ref 0.27–4.2)
VLDLC SERPL-MCNC: 24 MG/DL (ref 5–40)

## 2024-12-20 PROCEDURE — 82570 ASSAY OF URINE CREATININE: CPT

## 2024-12-20 PROCEDURE — 84443 ASSAY THYROID STIM HORMONE: CPT

## 2024-12-20 PROCEDURE — 80053 COMPREHEN METABOLIC PANEL: CPT

## 2024-12-20 PROCEDURE — 82043 UR ALBUMIN QUANTITATIVE: CPT

## 2024-12-20 PROCEDURE — 80061 LIPID PANEL: CPT

## 2024-12-20 NOTE — TELEPHONE ENCOUNTER
Raul Welch (Martinez: [id number])  PA Case ID #: [id number]  Rx #: [id number]  Need Help? Call us at [phone]  Outcome  Approved today by [place] [employer] 2017  The request has been approved. The authorization is effective from 12/20/2024 to 12/20/2025, as long as the member is enrolled in their current health plan. A written notification letter will follow with additional details.  Authorization Expiration Date: 12/19/2025  Drug  Dexcom G6 Transmitter  ePA cloud logo  Form  [facility] [place] [employer] ePA Form 2017 NCPDP

## 2024-12-20 NOTE — ASSESSMENT & PLAN NOTE
Diabetes is improving with treatment.   Continue current treatment regimen.  Regular aerobic exercise.  Reminded to get yearly retinal exam.    Patient is doing well with increase of Mounjaro to 12.5 mg weekly.  He will work on taking his other medications regularly.  Continue to monitor glucose levels with Dexcom and take note if he is getting a lot of hypoglycemia.  If so, we may need to back off on his Lantus.    Patient will have fasting labs done at his local lab tomorrow.  Further recommendations based on results.    Diabetes will be reassessed in 3 months

## 2024-12-23 NOTE — PROGRESS NOTES
Your kidney function, liver function, electrolytes, cholesterol and thyroid levels are in normal range.    Let me know if you have any questions or concerns about these results.    YASMANY Cheema

## 2025-02-03 ENCOUNTER — OFFICE VISIT (OUTPATIENT)
Dept: NEUROLOGY | Facility: CLINIC | Age: 50
End: 2025-02-03
Payer: COMMERCIAL

## 2025-02-03 VITALS
DIASTOLIC BLOOD PRESSURE: 70 MMHG | HEIGHT: 76 IN | BODY MASS INDEX: 38.36 KG/M2 | WEIGHT: 315 LBS | SYSTOLIC BLOOD PRESSURE: 118 MMHG | OXYGEN SATURATION: 96 % | HEART RATE: 88 BPM | TEMPERATURE: 96.4 F

## 2025-02-03 DIAGNOSIS — R41.89 SUBJECTIVE MEMORY COMPLAINTS: ICD-10-CM

## 2025-02-03 DIAGNOSIS — R41.840 ATTENTION DEFICIT: ICD-10-CM

## 2025-02-03 DIAGNOSIS — G47.33 OSA TREATED WITH BIPAP: ICD-10-CM

## 2025-02-03 DIAGNOSIS — R51.9 ACUTE INTRACTABLE HEADACHE, UNSPECIFIED HEADACHE TYPE: Primary | ICD-10-CM

## 2025-02-03 PROCEDURE — 3074F SYST BP LT 130 MM HG: CPT | Performed by: NURSE PRACTITIONER

## 2025-02-03 PROCEDURE — 3078F DIAST BP <80 MM HG: CPT | Performed by: NURSE PRACTITIONER

## 2025-02-03 PROCEDURE — 1159F MED LIST DOCD IN RCRD: CPT | Performed by: NURSE PRACTITIONER

## 2025-02-03 PROCEDURE — 99214 OFFICE O/P EST MOD 30 MIN: CPT | Performed by: NURSE PRACTITIONER

## 2025-02-03 PROCEDURE — 1160F RVW MEDS BY RX/DR IN RCRD: CPT | Performed by: NURSE PRACTITIONER

## 2025-02-03 RX ORDER — ATOGEPANT 60 MG/1
60 TABLET ORAL DAILY
Qty: 16 TABLET | Refills: 0 | COMMUNITY
Start: 2025-02-03

## 2025-02-03 RX ORDER — TIRZEPATIDE 12.5 MG/.5ML
INJECTION, SOLUTION SUBCUTANEOUS
COMMUNITY
Start: 2025-01-26

## 2025-02-03 RX ORDER — LEVOCETIRIZINE DIHYDROCHLORIDE 5 MG/1
1 TABLET, FILM COATED ORAL DAILY
COMMUNITY
Start: 2024-12-20

## 2025-02-03 NOTE — PROGRESS NOTES
"     Follow up Sleep Patient Office Visit      Patient Name: Raul Welch  : 1975   MRN: [id number]     Referring Physician: No ref. provider found    Chief Complaint:    Chief Complaint   Patient presents with    Follow-up     3M    Headache     Pt reports 19-20/30 HA days. Has one currently, rated 8/10.  States sometimes when he takes IBU it'll ease off.        History of Present Illness: Raul Welch is a 49 y.o. male who is here today to follow up with GONZÁLEZ, memory complaints, and migraines and was last seen on 2024.  He is taking Nortriptyline daily but it hasn't helped with his headaches or migraines.  He continues to have frequent headaches and migraines.  He has had about 20/30 headache days in the past month with at least 7-8 being severe.  He says his memory is about the same and he states, \"I just vianca forget in the middle of a conversation, and I'm having trouble grabbing words I need to come out\".  His son spends a lot of time with him and he has been complaining about his memory for at least a year.  He saw something on [place] about ADHD and he thinks he could have that.   *DME company- [employer]   *Mask- Nasal   *EEG on 2024 was normal.   *MRI brain with and without contrast, on 2024, was noncontributory.   *Neuropsychiatric memory testing re-reviewed at time of visit.     Pertinent Medical History:   Current compliance report reviewed and has been scanned into the patient's medical record.    Following taken from previous visit note:  Raul Welch is a 49 y.o. male who is here today to follow up with headaches, GONZÁLEZ and new memory concerns.  He is accompanied by his wife, Sofiya, who attends via cell phone.  Currently on AutoBipap max IPAP 25cm and min EPAP 15cm, compliance 96%, AHI 1/hour.  He is tolerating his pressures well.  He is using a nasal mask.  He is using [employer] [facility].   *Current compliance report reviewed and scanned into EMR.      Poor balance- says " "he continues to have poor balance \"And I've had that for a long time\"- previously diagnosed with vertigo.      Headaches- He is having a headache \"At least 2-3 times a day\".  He is taking OTC Ibuprofen PRN.  He has had 30/30 headache days in the past month, with most being described as severe.  Headache described as frontal, aching, not lasting 4 hours (his wife disagrees and says they last 6 hours), not accompanied by photophobia, nausea or photophobia.  He has a severe headache today.     *MRI brain with and without contrast, on 7/16/2024, was normal.   *Previously tried Topiramate without improvement.      Memory complaints- He often asks the same questions during a conversation, per his wife.  His wife sends him to the store with a grocery list and he doesn't get the things on the list.  He substitutes words for other words that don't seem to make sense- he gets aggravated after these episodes.  He is working part time- 4-5 hours 7 days a week.  He lives with his wife and 3 sons.  He performs all of his ADLs independently.  He drives and says he has gotten lost and states, \"I have been out driving and all the sudden I don't know how I got there\".  He says he has to tell himself to hit the gas or brake and it should be automatic to him as long as he has been driving.  His wife and son pay the household bills and says his wife has always done that.  He states, \"I have never been good with money\".  He got 29/30 on the MMSE today.  His wife does not feel his depression is well-controlled right now but he disagrees.  At the end of the visit he states, \"I'll be honest, I've already forgot most of what we've talked about\".      Subjective      Review of Systems:   Review of Systems   Neurological:  Positive for headache and memory problem.       Medications:     Current Outpatient Medications:     amoxicillin-clavulanate (AUGMENTIN) 875-125 MG per tablet, take 1 tablet by mouth 2 times a day until gone, Disp: , Rfl:    "  B-D UF III MINI PEN NEEDLES 31G X 5 MM misc, USE ONCE DAILY WITH INSULIN INJECTIONS AS DIRECTED, Disp: , Rfl: 11    Blood Glucose Monitoring Suppl device, Use as directed to check blood sugar, Disp: 1 each, Rfl: 0    Canagliflozin (Invokana) 300 MG tablet tablet, Take 1 tablet by mouth Daily., Disp: 90 tablet, Rfl: 1    carvedilol (COREG) 12.5 MG tablet, Take 1 tablet by mouth Every 12 (Twelve) Hours., Disp: , Rfl:     citalopram (CeleXA) 20 MG tablet, Take 1 tablet by mouth Daily., Disp: , Rfl:     Continuous Blood Gluc  (Dexcom G6 ) device, 1 each Continuous., Disp: 1 each, Rfl: 0    Continuous Glucose Sensor (Dexcom G6 Sensor), Use Every 10 (Ten) Days., Disp: 3 each, Rfl: 5    Continuous Glucose Transmitter (Dexcom G6 Transmitter) misc, Use 1 each Every 3 (Three) Months., Disp: 1 each, Rfl: 3    glucose blood (OneTouch Ultra) test strip, Test Three times daily, Disp: 100 each, Rfl: 3    hydrochlorothiazide (HYDRODIURIL) 25 MG tablet, Take 1 tablet by mouth Daily., Disp: , Rfl:     icosapent ethyl (VASCEPA) 1 g capsule capsule, Take 2 g by mouth 2 (Two) Times a Day With Meals. Put on hold for now, Disp: 360 capsule, Rfl: 1    Insulin Pen Needle 32G X 6 MM misc, Use daily with insulin, Disp: 100 each, Rfl: 11    Lantus SoloStar 100 UNIT/ML injection pen, Inject 80 Units under the skin into the appropriate area as directed Every Night. Put on hold for now, Disp: 72 mL, Rfl: 1    levocetirizine (XYZAL) 5 MG tablet, Take 1 tablet by mouth Daily., Disp: , Rfl:     lisinopril (PRINIVIL,ZESTRIL) 40 MG tablet, Take 1 tablet by mouth Daily., Disp: , Rfl:     metFORMIN (GLUCOPHAGE) 1000 MG tablet, Take 1 tablet by mouth 2 (Two) Times a Day With Meals., Disp: 180 tablet, Rfl: 1    Mounjaro 12.5 MG/0.5ML solution auto-injector, INJECT 12.5 MG (0.5ML) UNDER THE SKIN INTO THE APPROPRIATE AREA AS DIRECTED ONCE A WEEK., Disp: , Rfl:     Omega 3 1200 MG capsule, Take 1,000 mg by mouth 2 (Two) Times a Day., Disp: ,  "Rfl:     omeprazole (priLOSEC) 40 MG capsule, Take 1 capsule by mouth Daily., Disp: , Rfl:     Atogepant (Qulipta) 60 MG tablet, Take 1 tablet by mouth Daily., Disp: 16 tablet, Rfl: 0    Allergies:   No Known Allergies    Objective     Physical Exam:  Vital Signs:   Vitals:    02/03/25 1454   BP: 118/70   BP Location: Right arm   Patient Position: Sitting   Cuff Size: Adult   Pulse: 88   Temp: 96.4 °F (35.8 °C)   TempSrc: Infrared   SpO2: 96%   Weight: (!) 149 kg (329 lb 6.4 oz)   Height: 193 cm (75.98\")   PainSc:   8   PainLoc: Head     BMI: Body mass index is 40.11 kg/m².    Physical Exam  Vitals and nursing note reviewed.   Constitutional:       General: He is not in acute distress.     Appearance: Normal appearance. He is well-developed. He is obese. He is not diaphoretic.   HENT:      Head: Normocephalic and atraumatic.   Eyes:      Extraocular Movements: Extraocular movements intact.      Conjunctiva/sclera: Conjunctivae normal.   Pulmonary:      Effort: Pulmonary effort is normal. No respiratory distress.   Musculoskeletal:         General: Normal range of motion.   Skin:     General: Skin is warm and dry.      Comments: sweating   Neurological:      Mental Status: He is alert and oriented to person, place, and time.   Psychiatric:         Mood and Affect: Mood normal.         Behavior: Behavior normal.         Thought Content: Thought content normal.         Judgment: Judgment normal.         Assessment / Plan      Assessment/Plan:   Diagnoses and all orders for this visit:    1. Acute intractable headache, unspecified headache type (Primary)    2. GONZÁLEZ treated with BiPAP    3. Subjective memory complaints  -     ATN Profile; Future    4. Attention deficit  -     Ambulatory Referral to Psychiatry    Other orders  -     Atogepant (Qulipta) 60 MG tablet; Take 1 tablet by mouth Daily.  Dispense: 16 tablet; Refill: 0    *I have discussed his neuropsychiatric memory testing results today. He is agreeable to an ATN " profile today.  *I have advised patient to stop the Nortriptyline since it is not helping.   *Indications and possible SEs of Atogepant discussed with patient.   *Patient education on ADHD provided today.  Referral to psychiatric NP for evaluation.      Follow Up:   Return in about 3 months (around 5/3/2025) for Follow Up.    I have advised the patient the need to continue the use of CPAP.  Gold standard for treatment of sleep apnea includes weight loss, use of cpap and avoidance of alcohol.  Encouraged weight loss (if applicable) with a BMI goal of 24.  Untreated GONZÁLEZ may increase the risk for development of hypertension, stroke, myocardial infarction, diabetes, cardiovascular disease, work-related issues and driving accidents. I have counseled and advised the patient to avoid driving or operating heavy/dangerous equipment if feeling drowsy.     HUMPHREY Michele, FNP-C  [facility] Neurology and Sleep Medicine

## 2025-02-04 ENCOUNTER — LAB (OUTPATIENT)
Dept: LAB | Facility: HOSPITAL | Age: 50
End: 2025-02-04
Payer: COMMERCIAL

## 2025-02-04 DIAGNOSIS — R41.89 SUBJECTIVE MEMORY COMPLAINTS: ICD-10-CM

## 2025-02-04 PROCEDURE — 36415 COLL VENOUS BLD VENIPUNCTURE: CPT

## 2025-02-04 PROCEDURE — 83520 IMMUNOASSAY QUANT NOS NONAB: CPT

## 2025-02-08 LAB
A -- BETA-AMYLOID 42/40 RATIO: 0.11
AL BETA40 PLAS-MCNC: 211.32 PG/ML
AL BETA42 PLAS-MCNC: 22.95 PG/ML
ATN SUMMARY1: NORMAL
INFORMATION:: NORMAL
N -- NFL, PLASMA: 1.55 PG/ML (ref 0–3.78)
T -- P-TAU181: 0.72 PG/ML (ref 0–0.95)

## 2025-02-11 ENCOUNTER — TRANSCRIBE ORDERS (OUTPATIENT)
Dept: ADMINISTRATIVE | Facility: HOSPITAL | Age: 50
End: 2025-02-11
Payer: COMMERCIAL

## 2025-02-11 DIAGNOSIS — R10.11 RIGHT UPPER QUADRANT ABDOMINAL PAIN: Primary | ICD-10-CM

## 2025-03-19 ENCOUNTER — PRIOR AUTHORIZATION (OUTPATIENT)
Dept: ENDOCRINOLOGY | Facility: CLINIC | Age: 50
End: 2025-03-19

## 2025-03-19 ENCOUNTER — OFFICE VISIT (OUTPATIENT)
Dept: ENDOCRINOLOGY | Facility: CLINIC | Age: 50
End: 2025-03-19
Payer: COMMERCIAL

## 2025-03-19 VITALS
OXYGEN SATURATION: 98 % | HEIGHT: 76 IN | WEIGHT: 315 LBS | BODY MASS INDEX: 38.36 KG/M2 | HEART RATE: 80 BPM | DIASTOLIC BLOOD PRESSURE: 78 MMHG | SYSTOLIC BLOOD PRESSURE: 124 MMHG

## 2025-03-19 DIAGNOSIS — E11.9 TYPE 2 DIABETES MELLITUS WITHOUT COMPLICATION, WITH LONG-TERM CURRENT USE OF INSULIN: Primary | ICD-10-CM

## 2025-03-19 DIAGNOSIS — Z79.4 TYPE 2 DIABETES MELLITUS WITHOUT COMPLICATION, WITH LONG-TERM CURRENT USE OF INSULIN: Primary | ICD-10-CM

## 2025-03-19 LAB
EXPIRATION DATE: ABNORMAL
EXPIRATION DATE: ABNORMAL
GLUCOSE BLDC GLUCOMTR-MCNC: 219 MG/DL (ref 70–130)
HBA1C MFR BLD: 7.6 % (ref 4.5–5.7)
Lab: ABNORMAL
Lab: ABNORMAL

## 2025-03-19 RX ORDER — ACYCLOVIR 400 MG/1
1 TABLET ORAL
Qty: 9 EACH | Refills: 3 | Status: SHIPPED | OUTPATIENT
Start: 2025-03-19 | End: 2025-03-21

## 2025-03-19 RX ORDER — TIRZEPATIDE 12.5 MG/.5ML
12.5 INJECTION, SOLUTION SUBCUTANEOUS WEEKLY
Qty: 6 ML | Refills: 1 | Status: SHIPPED | OUTPATIENT
Start: 2025-03-19

## 2025-03-19 RX ORDER — ACYCLOVIR 400 MG/1
1 TABLET ORAL CONTINUOUS
Qty: 1 EACH | Refills: 0 | Status: SHIPPED | OUTPATIENT
Start: 2025-03-19 | End: 2025-03-21

## 2025-03-19 NOTE — PROGRESS NOTES
Office Note      Date: 2025  Patient Name: Raul Welch  MRN: [id number]  : 1975    Chief Complaint   Patient presents with    Diabetes     Type 2 diabetes mellitus without complication, with long-term current use of insulin    Vitamin D Deficiency       History of Present Illness:   Rual Welch is a 49 y.o. male who presents for Diabetes type 2.   Diagnosed:   Current RX: Metformin 1000mg BID  Invokana 300 mg daily  Mounjaro 12.5 mg weekly - tolerating well now  Lantus 80 u qhs    Bg checks are done: checking blood sugars in the afternoons; usually in the afternoons is running 130-150  Hypoglycemia :none    Patient has not been wearing Dexcom recently because he ran out early. Has not forgotten his Mounjaro    Still struggling with some memory issues. He is working on setting up some alarms and medicine boxes.     Last A1c:  Hemoglobin A1C   Date Value Ref Range Status   2025 7.6 (A) 4.5 - 5.7 % Final   2023 8.70 (H) 4.80 - 5.60 % Final       Changes in health since last visit: none.     DM Health Maintenance:  Ophtho:  done 2025, Dr. Arcos  Monofilament / Foot exam: follows with podiatrist q 9 weeks  Lipids/Statin: taking a statin with last FLP showing LDL 59 24  KERI: 24 normal  TSH: 0.767 24  Aspirin: not taking  ACE/ARB: lisinopril      Diabetic Complications:  Eyes: No  Kidneys: No  Feet: Yes, history of left toe amputation  Heart: No      Subjective          Review of Systems:   Review of Systems   Gastrointestinal:  Negative for abdominal pain, constipation, diarrhea and nausea.       The following portions of the patient's history were reviewed and updated as appropriate: allergies, current medications, past family history, past medical history, past social history, past surgical history, and problem list.    Objective     Visit Vitals  /78 (BP Location: Left arm, Patient Position: Sitting, Cuff Size: Adult)   Pulse 80   Ht 192.8 cm  "(75.9\")   Wt (!) 155 kg (342 lb)   SpO2 98%   BMI 41.74 kg/m²           Physical Exam:  Physical Exam  Constitutional:       Appearance: He is well-developed.   HENT:      Head: Normocephalic and atraumatic.      Right Ear: External ear normal.      Left Ear: External ear normal.   Eyes:      Conjunctiva/sclera: Conjunctivae normal.   Pulmonary:      Effort: Pulmonary effort is normal.   Musculoskeletal:         General: Normal range of motion.      Cervical back: Normal range of motion.   Skin:     General: Skin is warm and dry.   Psychiatric:         Behavior: Behavior normal.          Assessment / Plan      Assessment & Plan:  Diagnoses and all orders for this visit:    1. Type 2 diabetes mellitus without complication, with long-term current use of insulin (Primary)  Assessment & Plan:  Diabetes is worsening.   Continue current treatment regimen.    Patient has not been taking medications regularly and has not had Dexcom recently. Sent Dexcom G7 and patient will start this if covered by insurance. He will call if needs help with troubleshooting.    Discussed that we can increase dose of Mounjaro to 15 mg if A1c continues to be elevated at next visit.    Diabetes will be reassessed in 3 months    Orders:  -     POC Glucose, Blood  -     POC Glycosylated Hemoglobin (Hb A1C)  -     Continuous Glucose Sensor (Dexcom G7 Sensor) misc; Use 1 Device Every 10 (Ten) Days.  Dispense: 9 each; Refill: 3    Other orders  -     Mounjaro 12.5 MG/0.5ML solution auto-injector; Inject 0.5 mL under the skin into the appropriate area as directed 1 (One) Time Per Week.  Dispense: 6 mL; Refill: 1  -     Continuous Glucose  (Dexcom G7 ) device; Use 1 Device Continuous.  Dispense: 1 each; Refill: 0        Return in about 3 months (around 6/19/2025) for Follow up.    Portions of this note were completed with voice recognition program.  Electronically signed by Ethel Conti PA-C  [facility]  03/19/2025  "

## 2025-03-20 NOTE — TELEPHONE ENCOUNTER
Raul Welch (Key: [id number])  PA Case ID #: [id number]  Rx #: [id number]  Need Help? Call us at [phone]  Outcome  Approved on March 19 by [place] [employer] Med[employer] 2017  The request has been approved. The authorization is effective for a maximum of 12 fills from 03/19/2025 to 03/18/2026, as long as the member is enrolled in their current health plan. The request was approved as submitted. A written notification letter will follow with additional details.  Effective Date: 3/19/2025  Authorization Expiration Date: 3/18/2026  Drug  Dexcom G7  device  ePA cloud logo  Form  [facility] [place] [facility] ePA Form 2017 NCPDP

## 2025-03-20 NOTE — TELEPHONE ENCOUNTER
Raul Welch (Martinez: [id number])  PA Case ID #: [id number]  Rx #: [id number]  Need Help? Call us at [phone]  Outcome  Approved on March 19 by [place] Medicaid Med[employer]act 2017  The request has been approved. The authorization is effective for a maximum of 12 fills from 03/19/2025 to 03/19/2026, as long as the member is enrolled in their current health plan. A written notification letter will follow with additional details.  Effective Date: 3/19/2025  Authorization Expiration Date: 3/19/2026  Drug  Dexcom G7 Sensor  ePA cloud logo  Form  [employer] [place] Medicaid ePA Form 2017 NCPDP

## 2025-03-21 ENCOUNTER — OFFICE VISIT (OUTPATIENT)
Dept: PSYCHIATRY | Facility: CLINIC | Age: 50
End: 2025-03-21
Payer: COMMERCIAL

## 2025-03-21 VITALS
HEART RATE: 74 BPM | OXYGEN SATURATION: 96 % | HEIGHT: 76 IN | WEIGHT: 315 LBS | BODY MASS INDEX: 38.36 KG/M2 | SYSTOLIC BLOOD PRESSURE: 138 MMHG | DIASTOLIC BLOOD PRESSURE: 84 MMHG

## 2025-03-21 DIAGNOSIS — F33.1 MDD (MAJOR DEPRESSIVE DISORDER), RECURRENT EPISODE, MODERATE: Primary | ICD-10-CM

## 2025-03-21 DIAGNOSIS — F41.1 GAD (GENERALIZED ANXIETY DISORDER): ICD-10-CM

## 2025-03-21 PROBLEM — M20.42 ACQUIRED HAMMER TOE OF LEFT FOOT: Status: RESOLVED | Noted: 2023-07-19 | Resolved: 2025-03-21

## 2025-03-21 PROBLEM — L03.116 CELLULITIS OF LEFT FOOT: Status: RESOLVED | Noted: 2023-08-16 | Resolved: 2025-03-21

## 2025-03-21 PROBLEM — L97.509 ULCER OF FOOT: Status: RESOLVED | Noted: 2023-07-19 | Resolved: 2025-03-21

## 2025-03-21 PROBLEM — B35.1 ONYCHOMYCOSIS: Status: RESOLVED | Noted: 2023-07-19 | Resolved: 2025-03-21

## 2025-03-21 PROBLEM — M79.671 PAIN IN RIGHT FOOT: Status: RESOLVED | Noted: 2023-07-19 | Resolved: 2025-03-21

## 2025-03-21 PROBLEM — F32.A DEPRESSION: Status: RESOLVED | Noted: 2017-10-31 | Resolved: 2025-03-21

## 2025-03-21 PROBLEM — M86.179: Status: RESOLVED | Noted: 2023-09-24 | Resolved: 2025-03-21

## 2025-03-21 PROBLEM — M79.672 PAIN IN LEFT FOOT: Status: RESOLVED | Noted: 2023-07-19 | Resolved: 2025-03-21

## 2025-03-21 PROBLEM — M20.41 ACQUIRED HAMMER TOE OF RIGHT FOOT: Status: RESOLVED | Noted: 2023-07-19 | Resolved: 2025-03-21

## 2025-03-21 PROBLEM — L03.032 CELLULITIS OF TOE OF LEFT FOOT: Status: RESOLVED | Noted: 2023-09-06 | Resolved: 2025-03-21

## 2025-03-21 PROBLEM — L85.1 ACQUIRED KERATODERMA: Status: RESOLVED | Noted: 2023-07-19 | Resolved: 2025-03-21

## 2025-03-21 RX ORDER — ATORVASTATIN CALCIUM 40 MG/1
40 TABLET, FILM COATED ORAL DAILY
COMMUNITY

## 2025-03-21 RX ORDER — LORATADINE 10 MG/1
10 TABLET ORAL DAILY
COMMUNITY

## 2025-03-21 RX ORDER — CITALOPRAM HYDROBROMIDE 20 MG/1
20 TABLET ORAL DAILY
Qty: 30 TABLET | Refills: 3 | Status: SHIPPED | OUTPATIENT
Start: 2025-03-21

## 2025-03-21 NOTE — PROGRESS NOTES
"     New Patient Office Visit      Patient Name: Raul Welch  : 1975   MRN: [id number]     Referring Provider: Domi Strickland APRN    Chief Complaint:      ICD-10-CM ICD-9-CM   1. MDD (major depressive disorder), recurrent episode, moderate  F33.1 296.32   2. AMANUEL (generalized anxiety disorder)  F41.1 300.02        History of Present Illness:   Raul Welch is a 49 y.o. male who is here today for initial evaluation for medication management.  He was referred by neurology.  Patient has history of major depressive disorder, he has been on citalopram for many years.  Foremost he expresses severe issues with memory.  He presents alone and states that he does not even recall why he is here, I reviewed some of his previous notes with him and went over his neuropsych eval results.  He does recall taking that test.  He states \"I think my wife and Sarah think that I am really depressed.\"  He states that he has dealt with depression all of his life.    He describes his mood as apathetic, angry and irritable frequently, low motivation/low energy, feeling bad about his self, he expresses loss of interest in things that he used to enjoy.  This includes getting out and about and doing things with his wife.  He also typically would play cards with his male friends at least once a week and he states that if he gets canceled \"it used to really bother me and now I really do not care.\"  He endorses passive SI intermittently.  He states I am old and everything hurts and I get frustrated and sometimes I just think it would be better if I were done.  But I am never going to actually do it.\"  He denies any plan or intent convincingly.  He has chronic headaches which also contributed to his low mood.  Today he rates his headache as a 6 out of 10, 10 being the most severe.  He states that historically he has had some hallucinations and occasional voices but he states he has been aware of the reality of them and has " "not found them bothersome he states that this has not happened in \"a very long time but I could not tell you how long exactly.\"  He is also unable to specifically describe the hallucinations.    Anxiety-he endorses being tense, worrying frequently and catastrophizing.  When asked if he thinks the worst will happen about things he states \"the worst thing always does happen!\"  He endorses a history of panic episodes historically, he denies any recently.  He cannot state when he had the last one.  He denies any symptoms of OCD, paranoia, phobia, social anxiety.    Sleep-he now uses a BiPAP for his GONZÁLEZ.  He endorses hypnagogic hallucinations and states that he will \"throw punches\" when being awakened by others.  He states that he does this unknowingly.  He states that waking is difficult his sleep does not feel restful, he states that he has very vivid dreams that are unpleasant but denies nightmares.    Appetite-he is a type II diabetic, he states he has \"always had a bad relationship with food.\"  He has lost around to 100 pounds over the last year.  He did have his A1c closer to 5 but it has gotten slightly above 7 recently he states he has difficulty with candy.  He convincingly denies any SI, HI, AVH      Subjective     Past Medical History:   Past Medical History:   Diagnosis Date    Acquired hammer toe of left foot 07/19/2023    Acquired hammer toe of right foot 07/19/2023    Acquired keratoderma 07/19/2023    Acute osteomyelitis of phalanx of foot 09/24/2023    Arthritis     Asthma     SPOUSE REPORTS ISSUES AS A CHILD    Back pain     Bell's palsy     Blood in stool     Cellulitis of left foot 08/16/2023    Cellulitis of toe of left foot 09/06/2023    Depression     Diabetes     Diarrhea     WITH ABDOMINAL CRAMPING    Diverticulitis     Elevated cholesterol     GERD (gastroesophageal reflux disease)     Headache     High cholesterol     History of being tatooed     History of bronchitis     History of fracture  "    HX OF FOOT (SPOUSE UNSURE LATERALITY)    History of gout     Hypertension     Myocardial infarction 1997    Onychomycosis 07/19/2023    Pain in left foot 07/19/2023    Pain in right foot 07/19/2023    Sleep apnea     USES BIPAP HS - TO BRING IN THE DOS    Tattoos     Type 2 diabetes mellitus     Ulcer of foot 07/19/2023    Vitamin D deficiency 05/30/2023    Wears glasses        Past Surgical History:   Past Surgical History:   Procedure Laterality Date    AMPUTATION DIGIT Left 9/19/2023    Procedure: LEFT SECOND TOE AMPUTATION;  Surgeon: Shante Leslie DPM;  Location: [facility] OR;  Service: Podiatry;  Laterality: Left;    APPENDECTOMY  1990    CARDIAC CATHETERIZATION      CARDIAC CATHETERIZATION N/A 09/30/2017    Procedure: Coronary angiography;  Surgeon: Zander Bobo MD;  Location: [facility] CATH INVASIVE LOCATION;  Service:     COLONOSCOPY N/A 06/25/2018    Procedure: COLONOSCOPY with hot snare polypectomy,  cold snare polypectomy, cold biopsy polypectomies, and biopsies;  Surgeon: Sandro Potter MD;  Location: [facility] ENDOSCOPY;  Service: Gastroenterology    KNEE SURGERY Left     removed infection/mass in knee    OTHER SURGICAL HISTORY      SPOUSE REPORTS GROWTH REMOVED FROM THROAT WHEN PATIENT WAS A YOUNG CHILD       Family History:   Family History   Problem Relation Age of Onset    Diabetes Mother     Obesity Mother     Alcohol abuse Father     Diabetes Father     Heart disease Father     Hypertension Sister     Colon cancer Neg Hx     Cirrhosis Neg Hx     Liver cancer Neg Hx     Liver disease Neg Hx        Family Psychiatric History:  Cousin with known self-injurious behavior history    Screening Scores:   PHQ-9 : 17  AMANUEL-7 : 14    Therapy: Patient has not participated in therapy historically, but states that he would be interested in going forward.    Psychiatric History:   Previous medications tried: Patient not aware of any other historic psychiatric meds.  Inpatient admissions: Patient  denies  History of suicide/self-harm attempts: Patient endorses some intermittent SI, denies any attempts, denies any self injurious behavior  Family history of suicide or attempts: Patient unaware of any family history of suicide or attempts  Trauma/Abuse History: Patient denies any trauma or abuse at this time  Developmental History: Patient is not aware of any developmental delays as a child.  He states he grew up in [place] is from [place], he and his wife moved to [place] when his oldest son was in the second grade,.  He is  to his wife of 30+ years, they have 3 children together.  A 30-year-old son, 28 and 21-year-old children.  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  He graduated from high school in [place] and obtained his Associates degree in business management, he went to 2 more years of college majoring in art history.    RISK ASSESSMENT:  Does patient currently have intent, plan, ideation for suicide?  Patient convincingly denies  Access to firearms or weapons: Patient denies  History of homicidal ideation: Patient denies  Risk Taking/Impulsive Behavior (current or past): Patient denies any impulsive behavior historically describe: None   Protective factors: Wife, Luis    Social History:  Highest level of education obtained: college  Living situation: Lives with [place] in [place] [facility]  Patient's Occupation: Bread   Leisure and Recreation: Spending time with friends, Time with family, and reading comic books,  Support system: Wife  Illicit substance use: Denies  Alcohol use: Patient denies  Tobacco use: Patient denies any tobacco use historically    Legal History:   No legal issues.     Medications:     Current Outpatient Medications:     atorvastatin (LIPITOR) 40 MG tablet, Take 1 tablet by mouth Daily., Disp: , Rfl:     B-D UF III MINI PEN NEEDLES 31G X 5 MM misc, USE ONCE DAILY WITH INSULIN  INJECTIONS AS DIRECTED, Disp: , Rfl: 11    Blood Glucose Monitoring Suppl device, Use as directed to check blood sugar, Disp: 1 each, Rfl: 0    Canagliflozin (Invokana) 300 MG tablet tablet, Take 1 tablet by mouth Daily., Disp: 90 tablet, Rfl: 1    carvedilol (COREG) 12.5 MG tablet, Take 1 tablet by mouth Every 12 (Twelve) Hours., Disp: , Rfl:     citalopram (CeleXA) 20 MG tablet, Take 1 tablet by mouth Daily., Disp: 30 tablet, Rfl: 3    Continuous Blood Gluc  (Dexcom G6 ) device, 1 each Continuous., Disp: 1 each, Rfl: 0    Continuous Glucose Transmitter (Dexcom G6 Transmitter) misc, Use 1 each Every 3 (Three) Months., Disp: 1 each, Rfl: 3    glucose blood (OneTouch Ultra) test strip, Test Three times daily, Disp: 100 each, Rfl: 3    hydrochlorothiazide (HYDRODIURIL) 25 MG tablet, Take 1 tablet by mouth Daily., Disp: , Rfl:     icosapent ethyl (VASCEPA) 1 g capsule capsule, Take 2 g by mouth 2 (Two) Times a Day With Meals. Put on hold for now, Disp: 360 capsule, Rfl: 1    Insulin Pen Needle 32G X 6 MM misc, Use daily with insulin, Disp: 100 each, Rfl: 11    Lantus SoloStar 100 UNIT/ML injection pen, Inject 80 Units under the skin into the appropriate area as directed Every Night. Put on hold for now, Disp: 72 mL, Rfl: 1    lisinopril (PRINIVIL,ZESTRIL) 40 MG tablet, Take 1 tablet by mouth Daily., Disp: , Rfl:     loratadine (CLARITIN) 10 MG tablet, Take 1 tablet by mouth Daily., Disp: , Rfl:     metFORMIN (GLUCOPHAGE) 1000 MG tablet, Take 1 tablet by mouth 2 (Two) Times a Day With Meals., Disp: 180 tablet, Rfl: 1    Mounjaro 12.5 MG/0.5ML solution auto-injector, Inject 0.5 mL under the skin into the appropriate area as directed 1 (One) Time Per Week., Disp: 6 mL, Rfl: 1    omeprazole (priLOSEC) 40 MG capsule, Take 1 capsule by mouth Daily., Disp: , Rfl:     Cariprazine HCl (VRAYLAR) 1.5 MG capsule capsule, Take 1 capsule by mouth Daily., Disp: 30 capsule, Rfl: 1    Medication  "Considerations:  ROBERT/PDMP reviewed and appropriate in chart.     Allergies:   No Known Allergies    Objective     Physical Exam:  Vital Signs:   Vitals:    03/21/25 1215   BP: 138/84   Pulse: 74   SpO2: 96%   Weight: (!) 157 kg (346 lb 12.8 oz)   Height: 192.4 cm (75.75\")     Body mass index is 42.49 kg/m².     Mental Status Exam:   MENTAL STATUS EXAM   General Appearance:  Looks older than stated age and unkempt (Glasses, full beard)  Eye Contact:  Good eye contact  Attitude:  Cooperative and polite  Motor Activity:  Slow  Muscle Strength:  Normal  Speech:  Normal rate, tone, volume  Language:  Spontaneous  Mood and affect:  Normal, pleasant  Hopelessness:  Denies  Loneliness: Denies  Thought Process:  Logical  Associations/ Thought Content:  No delusions  Hallucinations:  None  Suicidal Ideations:  Passive ideation (Denies convincingly any plan or intent)  Homicidal Ideation:  Not present  Sensorium:  Alert and clear  Orientation:  Person, place, time and situation  Immediate Recall, Recent, and Remote Memory:  Deficit noted  Attention Span/ Concentration:  Good  Fund of Knowledge:  Appropriate for age and educational level  Intellectual Functioning:  Average range  Insight:  Good  Judgement:  Good  Reliability:  Fair  Impulse Control:  Good       Assessment / Plan      Visit Diagnosis/Orders Placed This Visit:  Diagnoses and all orders for this visit:    1. MDD (major depressive disorder), recurrent episode, moderate (Primary)  -     Cariprazine HCl (VRAYLAR) 1.5 MG capsule capsule; Take 1 capsule by mouth Daily.  Dispense: 30 capsule; Refill: 1  -     citalopram (CeleXA) 20 MG tablet; Take 1 tablet by mouth Daily.  Dispense: 30 tablet; Refill: 3    2. AMANUEL (generalized anxiety disorder)  -     citalopram (CeleXA) 20 MG tablet; Take 1 tablet by mouth Daily.  Dispense: 30 tablet; Refill: 3         Functional Status: Moderate impairment     Prognosis: Fair with Ongoing Treatment " "    Impression/Formulation:  Patient appeared alert and oriented.  Patient is voluntarily requesting to begin psychiatric medication management at [facility].  Patient is receptive to assistance with maintaining a stable lifestyle.  Patient presents with history of depression and anxiety, memory issues and sleep disturbances.  He has been on citalopram for many years.  It is likely that given the length of time he has been on this medication it is no longer responding to his symptoms.  He has chronic headaches that contribute to low energy and his low mood, as well as his irritability.  He endorses low motivation and difficulty getting through the day.  He does endorse some passive SI thinking that \"I just am tired of feeling this way.\"  He denies any plan or intent however.  We discussed adjuncting with the Vraylar due to its quick efficacy.  At some point we may transition off of Celexa, perhaps onto another reuptake inhibitor.  We discussed therapy which she has not participated in in the past, initially he asked a lot of questions about what that might look like, then he stated that he would be interested in starting therapy.  He states \"let's do it!  I will do anything if it will help.\"  I discussed all side effects, risks, benefits of medications.  Answered all questions.  Patient verbalized understanding and agrees to plan of care.  I gave patient a printed handout of the Vraylar-start Vraylar 1.5 mg, as he states he would have difficulty remembering about the medication.    ICD-10-CM ICD-9-CM   1. MDD (major depressive disorder), recurrent episode, moderate  F33.1 296.32   2. AMANUEL (generalized anxiety disorder)  F41.1 300.02   .     Care/Treatment Plan:   Initial visit with patient. No Care Plan or Treatment Plan initiated or created at this visit. However, we have discussed a temporary plan.   -Start Vraylar 1.5 mg, 1 Daily for mood  -Continue citalopram 20 mg, 1 tablet daily for " anxiety/mood  -Refer to therapy      Patient will continue supportive psychotherapy efforts and medications as indicated. Clinic will obtain release of information for current treatment team for continuity of care as needed. Patient will contact this office, call 911 or present to the nearest emergency room should suicidal or homicidal ideations occur. Discussed medication options and treatment plan of prescribed medication(s) as well as the risks, benefits, and potential side effects. Patient acknowledged and verbally consented to continue with current treatment plan and was educated on the importance of compliance with treatment and follow-up appointments.     Follow Up:   Return in about 4 weeks (around 4/18/2025).      HUMPHREY Hopkins, PMHNP-BC [facility]

## 2025-03-26 ENCOUNTER — TELEPHONE (OUTPATIENT)
Dept: ENDOCRINOLOGY | Facility: CLINIC | Age: 50
End: 2025-03-26
Payer: COMMERCIAL

## 2025-03-26 NOTE — TELEPHONE ENCOUNTER
PATIENT WANTED TO LET US KNOW HE WAS ABLE TO GET THE DEXCOM G7 MONITOR AND WAS ABLE TO GET IT LINKED TO HIS PHONE.

## 2025-04-16 ENCOUNTER — HOSPITAL ENCOUNTER (OUTPATIENT)
Dept: ULTRASOUND IMAGING | Facility: HOSPITAL | Age: 50
Discharge: HOME | End: 2025-04-16
Admitting: NURSE PRACTITIONER
Payer: COMMERCIAL

## 2025-04-16 DIAGNOSIS — R10.11 RIGHT UPPER QUADRANT ABDOMINAL PAIN: ICD-10-CM

## 2025-04-16 PROCEDURE — 76700 US EXAM ABDOM COMPLETE: CPT

## 2025-04-24 ENCOUNTER — OFFICE VISIT (OUTPATIENT)
Dept: PSYCHIATRY | Facility: CLINIC | Age: 50
End: 2025-04-24
Payer: COMMERCIAL

## 2025-04-24 DIAGNOSIS — F33.1 MDD (MAJOR DEPRESSIVE DISORDER), RECURRENT EPISODE, MODERATE: Primary | ICD-10-CM

## 2025-04-24 DIAGNOSIS — F41.1 GAD (GENERALIZED ANXIETY DISORDER): ICD-10-CM
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
## 2025-04-25 ENCOUNTER — PRIOR AUTHORIZATION (OUTPATIENT)
Dept: ENDOCRINOLOGY | Facility: CLINIC | Age: 50
End: 2025-04-25
Payer: COMMERCIAL

## 2025-04-25 NOTE — TELEPHONE ENCOUNTER
Raul Welch (Martinez: [id number])  Mounjaro 12.5MG/0.5ML auto-injectors  Form  [employer] [place] Medicaid ePA Form 2017 NCPDP  Created  5 hours ago  Sent to Plan  3 hours ago  Plan Response  3 hours ago  Submit Clinical Questions  3 hours ago  Determination  2 hours ago  Message from Plan  Prior Authorization is not required for this medication dosage form and strength at the quantity and days supply requested. The request for the medication has been previously approved and a current authorization exists. The current authorization on file, number [id number], is valid through 04/29/2025.

## 2025-04-29 ENCOUNTER — OFFICE VISIT (OUTPATIENT)
Dept: PSYCHIATRY | Facility: CLINIC | Age: 50
End: 2025-04-29
Payer: COMMERCIAL

## 2025-04-29 VITALS
DIASTOLIC BLOOD PRESSURE: 66 MMHG | HEIGHT: 76 IN | BODY MASS INDEX: 38.36 KG/M2 | SYSTOLIC BLOOD PRESSURE: 104 MMHG | HEART RATE: 90 BPM | WEIGHT: 315 LBS | OXYGEN SATURATION: 97 %

## 2025-04-29 DIAGNOSIS — F41.1 GAD (GENERALIZED ANXIETY DISORDER): Primary | ICD-10-CM

## 2025-04-29 DIAGNOSIS — F33.1 MDD (MAJOR DEPRESSIVE DISORDER), RECURRENT EPISODE, MODERATE: ICD-10-CM

## 2025-04-29 RX ORDER — LEVOCETIRIZINE DIHYDROCHLORIDE 5 MG/1
1 TABLET, FILM COATED ORAL DAILY
COMMUNITY
Start: 2025-04-13
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
## 2025-05-02 NOTE — TELEPHONE ENCOUNTER
Last office visit with prescribing clinician: 3/19/2025       Next office visit with prescribing clinician: 7/18/2025     {

## 2025-05-02 NOTE — TELEPHONE ENCOUNTER
Rx refill request  atorvastatin (LIPITOR) 40 MG tablet     To send to:  [id number] - [place], [place] - [address] - [phone]  - [phone] FX

## 2025-05-04 RX ORDER — ATORVASTATIN CALCIUM 40 MG/1
40 TABLET, FILM COATED ORAL DAILY
Qty: 90 TABLET | Refills: 1 | Status: SHIPPED | OUTPATIENT
Start: 2025-05-04

## 2025-05-09 ENCOUNTER — APPOINTMENT (OUTPATIENT)
Dept: CT IMAGING | Facility: HOSPITAL | Age: 50
End: 2025-05-09
Payer: COMMERCIAL

## 2025-05-09 ENCOUNTER — HOSPITAL ENCOUNTER (EMERGENCY)
Facility: HOSPITAL | Age: 50
Discharge: HOME | End: 2025-05-09
Attending: EMERGENCY MEDICINE
Payer: COMMERCIAL

## 2025-05-09 VITALS
BODY MASS INDEX: 39.17 KG/M2 | SYSTOLIC BLOOD PRESSURE: 100 MMHG | DIASTOLIC BLOOD PRESSURE: 67 MMHG | OXYGEN SATURATION: 94 % | TEMPERATURE: 97.8 F | RESPIRATION RATE: 18 BRPM | HEIGHT: 75 IN | WEIGHT: 315 LBS | HEART RATE: 86 BPM
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
## 2025-05-09 DIAGNOSIS — S20.229A CONTUSION OF BACK, UNSPECIFIED LATERALITY, INITIAL ENCOUNTER: ICD-10-CM

## 2025-05-09 PROCEDURE — 70450 CT HEAD/BRAIN W/O DYE: CPT

## 2025-05-09 PROCEDURE — 72128 CT CHEST SPINE W/O DYE: CPT

## 2025-05-09 PROCEDURE — 72125 CT NECK SPINE W/O DYE: CPT

## 2025-05-09 PROCEDURE — 99284 EMERGENCY DEPT VISIT MOD MDM: CPT | Performed by: EMERGENCY MEDICINE

## 2025-05-09 PROCEDURE — 72131 CT LUMBAR SPINE W/O DYE: CPT

## 2025-05-09 RX ORDER — LIDOCAINE 50 MG/G
1 PATCH TOPICAL EVERY 24 HOURS
Qty: 15 EACH | Refills: 0 | Status: SHIPPED | OUTPATIENT
Start: 2025-05-09

## 2025-05-09 RX ORDER — OXYCODONE HYDROCHLORIDE 5 MG/1
5 TABLET ORAL ONCE
Refills: 0 | Status: COMPLETED | OUTPATIENT
Start: 2025-05-09 | End: 2025-05-09

## 2025-05-09 RX ORDER — CYCLOBENZAPRINE HCL 10 MG
10 TABLET ORAL 3 TIMES DAILY PRN
Qty: 15 TABLET | Refills: 0 | Status: SHIPPED | OUTPATIENT
Start: 2025-05-09

## 2025-05-09 RX ADMIN — OXYCODONE 5 MG: 5 TABLET ORAL at 02:27

## 2025-05-09 NOTE — ED PROVIDER NOTES
EMERGENCY DEPARTMENT ENCOUNTER    Pt Name: Raul Welch  MRN: [id number]  Pt :   1975  Room Number:  10/10  Date of encounter:  2025  PCP: Domi Strickland APRN  ED Provider: Tucker Waldrop MD    Historian: Patient      HPI:  Chief Complaint   Patient presents with    Fall          Context: Raul Welch is a 49 y.o. male who presents to the ED c/o fall down several stairs, the patient slipped and fell, did not think that he hit his head, did not have loss of consciousness.  No nausea, no vomiting.  Reports pain throughout his back, rating down the left leg.  No weakness or numbness in the left leg      PAST MEDICAL HISTORY  Past Medical History:   Diagnosis Date    Acquired hammer toe of left foot 2023    Acquired hammer toe of right foot 2023    Acquired keratoderma 2023    Acute osteomyelitis of phalanx of foot 2023    Arthritis     Asthma     SPOUSE REPORTS ISSUES AS A CHILD    Back pain     Bell's palsy     Blood in stool     Cellulitis of left foot 2023    Cellulitis of toe of left foot 2023    Depression     Diabetes     Diarrhea     WITH ABDOMINAL CRAMPING    Diverticulitis     Elevated cholesterol     GERD (gastroesophageal reflux disease)     Headache     High cholesterol     History of being tatooed     History of bronchitis     History of fracture     HX OF FOOT (SPOUSE UNSURE LATERALITY)    History of gout     Hypertension     Myocardial infarction     Onychomycosis 2023    Pain in left foot 2023    Pain in right foot 2023    Sleep apnea     USES BIPAP HS - TO BRING IN THE DOS    Tattoos     Type 2 diabetes mellitus     Ulcer of foot 2023    Vitamin D deficiency 2023    Wears glasses          PAST SURGICAL HISTORY  Past Surgical History:   Procedure Laterality Date    AMPUTATION DIGIT Left 2023    Procedure: LEFT SECOND TOE AMPUTATION;  Surgeon: Shante Leslie DPM;  Location: [facility] OR;   Service: Podiatry;  Laterality: Left;    APPENDECTOMY      CARDIAC CATHETERIZATION      CARDIAC CATHETERIZATION N/A 2017    Procedure: Coronary angiography;  Surgeon: Zander Bobo MD;  Location: [facility] CATH INVASIVE LOCATION;  Service:     COLONOSCOPY N/A 2018    Procedure: COLONOSCOPY with hot snare polypectomy,  cold snare polypectomy, cold biopsy polypectomies, and biopsies;  Surgeon: Sandro Potter MD;  Location: [facility] ENDOSCOPY;  Service: Gastroenterology    KNEE SURGERY Left     removed infection/mass in knee    OTHER SURGICAL HISTORY      SPOUSE REPORTS GROWTH REMOVED FROM THROAT WHEN PATIENT WAS A YOUNG CHILD         FAMILY HISTORY  Family History   Problem Relation Age of Onset    Diabetes Mother     Obesity Mother     Alcohol abuse Father     Diabetes Father     Heart disease Father     Hypertension Sister     Colon cancer Neg Hx     Cirrhosis Neg Hx     Liver cancer Neg Hx     Liver disease Neg Hx          SOCIAL HISTORY  Social History     Socioeconomic History    Marital status:    Tobacco Use    Smoking status: Former     Current packs/day: 0.00     Average packs/day: 0.3 packs/day for 0.5 years (0.1 ttl pk-yrs)     Types: Cigarettes     Start date: 7/3/1989     Quit date: 1990     Years since quittin.3     Passive exposure: Past    Smokeless tobacco: Never    Tobacco comments:     Smoked a [email or web address]   Vaping Use    Vaping status: Never Used   Substance and Sexual Activity    Alcohol use: Never    Drug use: Never    Sexual activity: Not Currently     Partners: Female         ALLERGIES  Patient has no known allergies.        REVIEW OF SYSTEMS  Review of Systems   Constitutional:  Negative for chills and fever.   HENT:  Negative for sore throat and trouble swallowing.    Eyes:  Negative for pain and redness.   Respiratory:  Negative for cough and shortness of breath.    Cardiovascular:  Negative for chest pain and leg swelling.   Gastrointestinal:   Negative for abdominal pain, nausea and vomiting.   Genitourinary:  Negative for dysuria and urgency.   Musculoskeletal:  Positive for back pain. Negative for neck pain.   Skin:  Negative for rash and wound.   Neurological:  Positive for headaches. Negative for dizziness and weakness.        All systems reviewed and negative except for those discussed in HPI.       PHYSICAL EXAM    I have reviewed the triage vital signs and nursing notes.    ED Triage Vitals [05/09/25 0158]   Temp Heart Rate Resp BP SpO2   97.8 °F (36.6 °C) 86 18 103/81 97 %      Temp src Heart Rate Source Patient Position BP Location FiO2 (%)   Oral Monitor Sitting Left arm --       Physical Exam  Constitutional:       Appearance: Normal appearance. He is not ill-appearing.   HENT:      Head: Normocephalic and atraumatic.      Right Ear: External ear normal.      Left Ear: External ear normal.      Nose: Nose normal.      Mouth/Throat:      Mouth: Mucous membranes are moist.      Pharynx: Oropharynx is clear.   Eyes:      Extraocular Movements: Extraocular movements intact.      Conjunctiva/sclera: Conjunctivae normal.      Pupils: Pupils are equal, round, and reactive to light.   Cardiovascular:      Rate and Rhythm: Normal rate and regular rhythm.      Pulses:           Radial pulses are 2+ on the right side and 2+ on the left side.      Heart sounds: No murmur heard.  Pulmonary:      Effort: Pulmonary effort is normal.      Breath sounds: Normal breath sounds.   Abdominal:      General: There is no distension.      Tenderness: There is no abdominal tenderness. There is no guarding.   Musculoskeletal:         General: No swelling or deformity.      Cervical back: Normal range of motion and neck supple. Tenderness and bony tenderness present.      Thoracic back: Tenderness and bony tenderness present.      Lumbar back: Tenderness and bony tenderness present.   Skin:     General: Skin is warm and dry.      Capillary Refill: Capillary refill takes  less than 2 seconds.      Findings: No rash.   Neurological:      General: No focal deficit present.      Mental Status: He is alert and oriented to person, place, and time.            LAB RESULTS  No results found for this or any previous visit (from the past 24 hours).    If labs were ordered, I independently reviewed the results and considered them in treating the patient.        RADIOLOGY  CT Lumbar Spine Without Contrast  Result Date: 5/9/2025  FINAL REPORT TECHNIQUE: null CLINICAL HISTORY: fall down stairs, low back pain COMPARISON: null FINDINGS: CT lumbar spine without contrast Comparison: None provided. Findings: Unenhanced spiral CT survey of lumbar spine performed in axial plane with coronal and sagittal reconstruction images obtained. Demineralization. Significant respiratory motion. Mild multilevel chronic appearing anterior wedging configuration lower thoracic upper lumbar vertebral bodies with no definite acute fracture. Comparison with prior studies, follow-up, or MRI as clinically indicated. No significant subluxation. Narrowed L5-S1 disc space. Multilevel anterior and posterior vertebral body osteophytes. Multilevel facet arthropathy. Degenerative changes right-greater-than-left SI joints. Fecal retention.     Impression: 1. No acute fracture or subluxation of lumbar spine seen. Authenticated and Electronically Signed by Todd Hanley MD on 05/09/2025 03:52:36 AM    CT Thoracic Spine Without Contrast  Result Date: 5/9/2025  FINAL REPORT TECHNIQUE: null CLINICAL HISTORY: fall down stairs COMPARISON: null FINDINGS: CT thoracic spine without contrast Comparison: None provided. Findings: Unenhanced spiral CT survey of thoracic spine performed in axial plane with coronal and sagittal reconstruction images obtained. Severe respiratory motion. Demineralization. Multilevel mild chronic appearing anterior wedging configuration thoracic vertebral bodies with no acute fracture seen. Comparison with prior  studies, follow-up, or MRI as clinically indicated. Multilevel Schmorl's nodes and bridging anterior vertebral body osteophytes. No subluxation. No fracture evident. No subluxation. No prevertebral soft tissue thickening. Disc space heights are adequately maintained. No significant arthritic changes.     Impression: 1. No acute fracture or subluxation of thoracic spine seen. Authenticated and Electronically Signed by Todd Hanley MD on 05/09/2025 03:51:45 AM    CT Cervical Spine Without Contrast  Result Date: 5/9/2025  FINAL REPORT TECHNIQUE: null CLINICAL HISTORY: fall, down stairs COMPARISON: null FINDINGS: CT cervical spine without contrast Comparison: None provided. Findings: Unenhanced spiral CT survey of cervical spine performed in axial plane with coronal and sagittal reconstruction images obtained. Respiratory motion artifact. No fracture seen. If symptoms persist close follow-up recommended. No subluxation. No prevertebral soft tissue thickening. Disc space heights are adequately maintained. Multilevel partial ossification of longitudinal ligaments particularly posterior. Multilevel central disc protrusion most severely at C3-4. MRI as clinically indicated. Multiple bilateral palatine tonsilliths.     Impression: 1. No fracture or subluxation of cervical spine seen. Authenticated and Electronically Signed by Todd Hanley MD on 05/09/2025 03:45:26 AM    CT Head Without Contrast  Result Date: 5/9/2025  FINAL REPORT TECHNIQUE: null CLINICAL HISTORY: fall down stairs, headache COMPARISON: null FINDINGS: CT head without contrast Comparison: None provided. Findings: Unenhanced CT survey of head performed in axial plane. Coronal reconstruction images obtained. No intra- or extra-axial hemorrhage, deep white matter edema, or mass effect including midline shift. Ventricles and sulci are normal in size. No fracture seen. Minimal mucosal thickening bilateral ethmoid air cells. Trace clouding bilateral mastoid air  cells.     Impression: 1. Negative for acute intracranial CT abnormality. Authenticated and Electronically Signed by Todd Hanley MD on 05/09/2025 03:38:29 AM          PROCEDURES    Procedures    Interpretations    O2 Sat: The patient's oxygen saturation was 94% on Room Air.  This was independently interpreted by me as Normal      Cardiac Monitoring: I reviewed and independently interpreted the Rhythm Strip as Normal Sinus rhythm rate of 86    Radiology: I ordered and independently reviewed the above noted radiographic studies.  I viewed images of CT Head and CT cervical, thoracic and lumbar spines  which showed No fracture per my independent interpretation. See radiologist's dictation for official interpretation.         MEDICATIONS GIVEN IN ER    Medications   oxyCODONE (ROXICODONE) immediate release tablet 5 mg (5 mg Oral Given 5/9/25 0227)         MEDICAL DECISION MAKING, PROGRESS, and CONSULTS    All labs, if obtained, have been independently reviewed by me.  All radiology studies, if obtained, have been reviewed by me and the radiologist dictating the report.  All EKG's, if obtained, have been independently viewed and interpreted by me      Discussion below represents my analysis of pertinent findings related to patient's condition, differential diagnosis, treatment plan and final disposition.      Differential diagnosis:    49-year-old male presenting to the ED with complaint of fall down the stairs, he has pain in his back, radiates down his left leg, concerning for fracture, versus contusion, he also did suffer some head trauma no loss conscious but he has a worsening headache.  Will obtain CT scan of the head to rule out intracranial injury, CT scan of the CT and L-spine, provide oral pain medication    Additional Sources:  External (non-ED) record review:  Psychiatry note 4/29/2025 for mood disorder       Orders placed during this visit:  Orders Placed This Encounter   Procedures    CT Head Without  Contrast    CT Cervical Spine Without Contrast    CT Thoracic Spine Without Contrast    CT Lumbar Spine Without Contrast         Additional orders considered but not ordered:  None    ED Course:    Consultants:  None    ED Course as of 05/09/25 0359   Fri May 09, 2025   0357 CT scans are negative, patient is resting comfortably, no neurologic findings, will discharge the patient home with outpatient follow-up.  He voiced understanding is agreeable with the plan [CS]      ED Course User Index  [CS] Tucker Waldrop MD           After my consideration of clinical presentation and any laboratory/radiology studies obtained, I discussed the findings with the patient/patient representative who is in agreement with the treatment plan and the final disposition. Risks and benefits of discharge were discussed.     AS OF 03:59 EDT VITALS:    BP - 100/67  HR - 86  TEMP - 97.8 °F (36.6 °C) (Oral)  O2 SATS - 94%    I reviewed the patient's prescription monitoring report if available prior to discharge    DIAGNOSIS  Final diagnoses:   Fall down stairs, initial encounter   Contusion of back, unspecified laterality, initial encounter         DISPOSITION  ED Disposition       ED Disposition   Discharge    Condition   Stable    Comment   --                   Please note that portions of this document were completed with voice recognition software.        Tucker Waldrop MD  05/09/25 040

## 2025-06-04 ENCOUNTER — PRIOR AUTHORIZATION (OUTPATIENT)
Dept: ENDOCRINOLOGY | Facility: CLINIC | Age: 50
End: 2025-06-04
Payer: COMMERCIAL

## 2025-06-04 NOTE — LETTER
June 9, 2025    Raul Welch  [address] [place] [id number]    To Whom It May Concern,    Raul Welch is a patient in my clinic with type 2 diabetes and this is an appeal to cover Mounjaro.  He has been successfully taking Mounjaro with significant improvement in his diabetes and therefore reduction in health risks related to diabetes.  He has tried both Trulicity and Ozempic in the past for treatment and has not had adequate response to either of these medications.  I am requesting that you approve Mounjaro coverage for this patient so he can continue to take the medication that has worked for him.    Please do not hesitate to reach out with further questions or concerns.    Sincerely,        YASMANY Cheema

## 2025-06-05 NOTE — TELEPHONE ENCOUNTER
Mounjaro denied by insurance.  The member has had at least a 3-month trial and failure [drug did not work], allergy, contraindication [harmful for] (including potential drug-drug interactions with other medications) or intolerance [side effect] to two preferred GLP-1 agonists [drug in the same group]: Byetta, Ozempic, Trulicity, Victoza

## 2025-06-05 NOTE — TELEPHONE ENCOUNTER
Looks like he tried Ozempic and Trulicity in the past with Vedrana. Could I addend my  note with this information and retry the PA?

## 2025-06-09 ENCOUNTER — TELEPHONE (OUTPATIENT)
Dept: ENDOCRINOLOGY | Facility: CLINIC | Age: 50
End: 2025-06-09
Payer: COMMERCIAL

## 2025-06-09 NOTE — TELEPHONE ENCOUNTER
"Provider: YEN        Caller: Raul Welch \"Earnest\"    Relationship to Patient: Self    Phone Number: [phone]     Reason for Call: PATIENT CALLING TO FIND OUT IF BENITORO WAS APPROVED THROUGH INSURANCE, PLEASE CALL TO ADVISE   "

## 2025-06-09 NOTE — LETTER
July 9, 2025    Raul Welch  [address] [place] [id number]    To Whom It May Concern,    Raul Welch is a patient in my clinic with type 2 diabetes and this is an appeal to cover Mounjaro.  He has been successfully taking Mounjaro with significant improvement in his diabetes and therefore reduction in health risks related to diabetes.  He has tried both Trulicity and Ozempic (6/2020-5/2021) in the past for treatment and has not had adequate response to either of these medications.  I am requesting that you approve Mounjaro coverage for this patient so he can continue to take the medication that has worked for him.    Sincerely,        Ethel Conti PA   [facility]

## 2025-06-11 DIAGNOSIS — E11.9 TYPE 2 DIABETES MELLITUS WITHOUT COMPLICATION, WITH LONG-TERM CURRENT USE OF INSULIN: ICD-10-CM

## 2025-06-11 DIAGNOSIS — Z79.4 TYPE 2 DIABETES MELLITUS WITHOUT COMPLICATION, WITH LONG-TERM CURRENT USE OF INSULIN: ICD-10-CM

## 2025-06-12 RX ORDER — CANAGLIFLOZIN 300 MG/1
1 TABLET, FILM COATED ORAL DAILY
Qty: 90 TABLET | Refills: 1 | Status: SHIPPED | OUTPATIENT
Start: 2025-06-12

## 2025-06-12 NOTE — TELEPHONE ENCOUNTER
Rx Refill Note  Requested Prescriptions     Pending Prescriptions Disp Refills    Invokana 300 MG tablet tablet [Pharmacy Med Name: Invokana Oral Tablet 300 MG] 90 tablet 0     Sig: TAKE 1 TABLET BY MOUTH EVERY DAY          Last office visit with prescribing clinician: 3/19/2025     Next office visit with prescribing clinician: 7/18/2025   }    Ric Ospina MA  06/12/25, 08:07 EDT

## 2025-07-08 NOTE — TELEPHONE ENCOUNTER
APPEAL DENIED FOR MOUNJARO 12.5MG. PER DENIAL INSUFF. DATA RECEIVED NOTHING STATES THE DATES ON THE USE OF OZEMPIC. THEY CAN SEE THE TRULICITY CLAIMS BUT NOT OZEMPIC. BORA

## 2025-07-09 NOTE — TELEPHONE ENCOUNTER
I did not know I needed to put the dates in the letter. I wrote a new letter with specific dates for Ozempic (6/2020-5/2021)

## 2025-07-18 ENCOUNTER — OFFICE VISIT (OUTPATIENT)
Dept: ENDOCRINOLOGY | Facility: CLINIC | Age: 50
End: 2025-07-18
Payer: COMMERCIAL

## 2025-07-18 VITALS
WEIGHT: 309 LBS | OXYGEN SATURATION: 98 % | DIASTOLIC BLOOD PRESSURE: 79 MMHG | SYSTOLIC BLOOD PRESSURE: 120 MMHG | HEIGHT: 75 IN | HEART RATE: 74 BPM | BODY MASS INDEX: 38.42 KG/M2

## 2025-07-18 DIAGNOSIS — Z79.4 TYPE 2 DIABETES MELLITUS WITHOUT COMPLICATION, WITH LONG-TERM CURRENT USE OF INSULIN: Primary | ICD-10-CM

## 2025-07-18 DIAGNOSIS — E11.9 TYPE 2 DIABETES MELLITUS WITHOUT COMPLICATION, WITH LONG-TERM CURRENT USE OF INSULIN: Primary | ICD-10-CM

## 2025-07-18 LAB
EXPIRATION DATE: ABNORMAL
EXPIRATION DATE: NORMAL
GLUCOSE BLDC GLUCOMTR-MCNC: 124 MG/DL (ref 70–130)
HBA1C MFR BLD: 6.5 % (ref 4.5–5.7)
Lab: ABNORMAL
Lab: NORMAL

## 2025-07-18 RX ORDER — INSULIN GLARGINE 100 [IU]/ML
80 INJECTION, SOLUTION SUBCUTANEOUS NIGHTLY
Qty: 72 ML | Refills: 1 | Status: SHIPPED | OUTPATIENT
Start: 2025-07-18

## 2025-07-18 RX ORDER — ACYCLOVIR 400 MG/1
TABLET ORAL
COMMUNITY
End: 2025-07-18 | Stop reason: SDUPTHER

## 2025-07-18 RX ORDER — ACYCLOVIR 400 MG/1
1 TABLET ORAL
Qty: 9 EACH | Refills: 3 | Status: SHIPPED | OUTPATIENT
Start: 2025-07-18

## 2025-07-18 NOTE — ASSESSMENT & PLAN NOTE
Diabetes is improving with treatment.   Continue current treatment regimen.  Recommended an ADA diet.  Regular aerobic exercise.    Patient has been successful with Mounjaro for treatment. Recently insurance has denied coverage of this medication. In process of appealing the decision, letter sent to insurance. Will wait to see what the determination is. If not covered, patient will go back to Ozempic or Trulicity and A1c may increase. For now, continue taking Trulicity he has on hand at home.    Continue Invokana 300 mg daily, metformin 1000 mg BID and Lantus 80 units qhs.    Eye exam up to date, foot exam up to date, fasting labs and urine microalbumin creatinine ratio up to date.    Diabetes will be reassessed in 3 months

## 2025-07-18 NOTE — PROGRESS NOTES
Office Note      Date: 2025  Patient Name: Raul Welch  MRN: [id number]  : 1975    Chief Complaint   Patient presents with    Diabetes       History of Present Illness:   Raul Welch is a 49 y.o. male who presents for Diabetes type 2.   Diagnosed:   Current RX: Metformin 1000mg BID  Invokana 300 mg daily  Mounjaro 12.5 mg weekly - tolerating well now- on Trulicity for the past 3 weeks  Lantus 80 u qhs    Bg checks are done: checking blood sugars in the afternoons; usually in the afternoons is running 130-150  Hypoglycemia :none    Did not bring his Dexcom  today. Had to start using a  because new phone is not compatible with Dexcom G7.    Notes that he has been feeling more fatigued, not sleeping well. Notes that he did start Vraylar recently. Also switched to Trulicity.    Patient ran out of Mounjaro about a month ago because insurance would not cover. He had some Trulicity left over so he has restarted it for the past 3 weeks. He has received word from his insurance that they are still considering the appeal.    Last A1c:  Hemoglobin A1C   Date Value Ref Range Status   2025 6.5 (A) 4.5 - 5.7 % Final   2023 8.70 (H) 4.80 - 5.60 % Final       Changes in health since last visit: none.     DM Health Maintenance:  Ophtho:  done 2025, Dr. Arcos  Monofilament / Foot exam: follows with podiatrist q 9 weeks  Lipids/Statin: taking a statin with last FLP showing LDL 59 24  KERI: 24 normal  TSH: 0.767 24  Aspirin: not taking  ACE/ARB: lisinopril      Diabetic Complications:  Eyes: No  Kidneys: No  Feet: Yes, history of left toe amputation  Heart: No    Subjective          Review of Systems:   Review of Systems   Constitutional:  Negative for activity change, appetite change and fatigue.   Respiratory:  Negative for chest tightness and shortness of breath.    Gastrointestinal:  Negative for abdominal pain.   Musculoskeletal:  Negative for  "myalgias.   Neurological:  Negative for numbness.   Psychiatric/Behavioral:  The patient is not nervous/anxious.        The following portions of the patient's history were reviewed and updated as appropriate: allergies, current medications, past family history, past medical history, past social history, past surgical history, and problem list.    Objective     Visit Vitals  /79   Pulse 74   Ht 190.5 cm (75\")   Wt (!) 140 kg (309 lb)   SpO2 98%   BMI 38.62 kg/m²           Physical Exam:  Physical Exam  Constitutional:       Appearance: He is well-developed.   HENT:      Head: Normocephalic and atraumatic.      Right Ear: External ear normal.      Left Ear: External ear normal.   Eyes:      Conjunctiva/sclera: Conjunctivae normal.   Pulmonary:      Effort: Pulmonary effort is normal.   Musculoskeletal:         General: Normal range of motion.      Cervical back: Normal range of motion.   Skin:     General: Skin is warm and dry.   Psychiatric:         Behavior: Behavior normal.          Assessment / Plan      Assessment & Plan:  Diagnoses and all orders for this visit:    1. Type 2 diabetes mellitus without complication, with long-term current use of insulin (Primary)  Assessment & Plan:  Diabetes is improving with treatment.   Continue current treatment regimen.  Recommended an ADA diet.  Regular aerobic exercise.    Patient has been successful with Mounjaro for treatment. Recently insurance has denied coverage of this medication. In process of appealing the decision, letter sent to insurance. Will wait to see what the determination is. If not covered, patient will go back to Ozempic or Trulicity and A1c may increase. For now, continue taking Trulicity he has on hand at home.    Continue Invokana 300 mg daily, metformin 1000 mg BID and Lantus 80 units qhs.    Eye exam up to date, foot exam up to date, fasting labs and urine microalbumin creatinine ratio up to date.    Diabetes will be reassessed in 3 " months    Orders:  -     POC Glucose, Blood  -     POC Glycosylated Hemoglobin (Hb A1C)  -     Continuous Glucose Sensor (Dexcom G7 Sensor) misc; Use 1 Device Every 10 (Ten) Days.  Dispense: 9 each; Refill: 3  -     Lantus SoloStar 100 UNIT/ML injection pen; Inject 80 Units under the skin into the appropriate area as directed Every Night. Put on hold for now  Dispense: 72 mL; Refill: 1        Return in about 3 months (around 10/18/2025) for Follow up.    Portions of this note were completed with voice recognition program.  Electronically signed by Ethel Conti PA-C  [facility] [place]  07/18/2025

## 2025-07-19 DIAGNOSIS — F41.1 GAD (GENERALIZED ANXIETY DISORDER): ICD-10-CM

## 2025-07-19 DIAGNOSIS — F33.1 MDD (MAJOR DEPRESSIVE DISORDER), RECURRENT EPISODE, MODERATE: ICD-10-CM

## 2025-07-21 NOTE — TELEPHONE ENCOUNTER
Refill request sent thru interface. Patient should have enough medication until appointment on 07/29/2025.

## 2025-07-22 RX ORDER — CITALOPRAM HYDROBROMIDE 20 MG/1
20 TABLET ORAL DAILY
Qty: 30 TABLET | Refills: 0 | Status: SHIPPED | OUTPATIENT
Start: 2025-07-22

## 2025-07-29 ENCOUNTER — TELEPHONE (OUTPATIENT)
Dept: PSYCHIATRY | Facility: CLINIC | Age: 50
End: 2025-07-29
Payer: COMMERCIAL

## 2025-07-29 ENCOUNTER — PATIENT MESSAGE (OUTPATIENT)
Dept: ENDOCRINOLOGY | Facility: CLINIC | Age: 50
End: 2025-07-29
Payer: COMMERCIAL

## 2025-07-29 ENCOUNTER — OFFICE VISIT (OUTPATIENT)
Dept: PSYCHIATRY | Facility: CLINIC | Age: 50
End: 2025-07-29
Payer: COMMERCIAL

## 2025-07-29 VITALS
HEIGHT: 75 IN | WEIGHT: 315 LBS | HEART RATE: 100 BPM | BODY MASS INDEX: 39.17 KG/M2 | SYSTOLIC BLOOD PRESSURE: 108 MMHG | OXYGEN SATURATION: 98 % | DIASTOLIC BLOOD PRESSURE: 72 MMHG

## 2025-07-29 DIAGNOSIS — F33.1 MDD (MAJOR DEPRESSIVE DISORDER), RECURRENT EPISODE, MODERATE: Primary | ICD-10-CM

## 2025-07-29 DIAGNOSIS — F41.1 GAD (GENERALIZED ANXIETY DISORDER): ICD-10-CM

## 2025-07-29 RX ORDER — LEVOCETIRIZINE DIHYDROCHLORIDE 5 MG/1
5 TABLET, FILM COATED ORAL EVERY EVENING
COMMUNITY

## 2025-07-29 NOTE — TELEPHONE ENCOUNTER
An PARK for Sofiya Strickland was signed by patient in office and scanned into chart. Request for most recent labs was faxed successfully.

## 2025-07-31 ENCOUNTER — PRIOR AUTHORIZATION (OUTPATIENT)
Dept: ENDOCRINOLOGY | Facility: CLINIC | Age: 50
End: 2025-07-31
Payer: COMMERCIAL

## 2025-07-31 RX ORDER — SEMAGLUTIDE 2.68 MG/ML
2 INJECTION, SOLUTION SUBCUTANEOUS WEEKLY
Qty: 3 ML | Refills: 5 | Status: SHIPPED | OUTPATIENT
Start: 2025-07-31

## 2025-08-01 NOTE — TELEPHONE ENCOUNTER
Raul Welch (Martinez: [id number])  Rx #: [id number]  Ozempic (2 MG/DOSE) 8MG/3ML pen-injectors  Form  [employer] [place] Medicaid ePA Form 2017 NCPDP  Created  17 hours ago  Sent to Plan  16 hours ago  Plan Response  16 hours ago  Submit Clinical Questions  16 hours ago  Determination  Favorable  15 hours ago  Message from Plan  The request has been approved. The authorization is effective from 07/31/2025 to 01/30/2026, as long as the member is enrolled in their current health plan. The request was approved as submitted. A written notification letter will follow with additional details.. Authorization Expiration Date: January 30, 2026.

## 2025-08-08 ENCOUNTER — TRANSCRIBE ORDERS (OUTPATIENT)
Dept: ADMINISTRATIVE | Facility: HOSPITAL | Age: 50
End: 2025-08-08
Payer: COMMERCIAL

## 2025-08-08 DIAGNOSIS — E07.9 DISEASE OF THYROID GLAND: Primary | ICD-10-CM

## 2025-08-17 DIAGNOSIS — F33.1 MDD (MAJOR DEPRESSIVE DISORDER), RECURRENT EPISODE, MODERATE: ICD-10-CM

## 2025-08-17 DIAGNOSIS — F41.1 GAD (GENERALIZED ANXIETY DISORDER): ICD-10-CM

## 2025-08-18 RX ORDER — CITALOPRAM HYDROBROMIDE 20 MG/1
20 TABLET ORAL DAILY
Qty: 30 TABLET | Refills: 0 | Status: SHIPPED | OUTPATIENT
Start: 2025-08-18

## 2025-08-25 ENCOUNTER — OFFICE VISIT (OUTPATIENT)
Dept: NEUROLOGY | Facility: CLINIC | Age: 50
End: 2025-08-25
Payer: COMMERCIAL

## 2025-08-25 ENCOUNTER — TELEPHONE (OUTPATIENT)
Dept: NEUROLOGY | Facility: CLINIC | Age: 50
End: 2025-08-25

## 2025-08-25 VITALS
OXYGEN SATURATION: 97 % | BODY MASS INDEX: 37.87 KG/M2 | TEMPERATURE: 97.6 F | WEIGHT: 304.6 LBS | HEART RATE: 89 BPM | HEIGHT: 75 IN | SYSTOLIC BLOOD PRESSURE: 120 MMHG | DIASTOLIC BLOOD PRESSURE: 72 MMHG

## 2025-08-25 DIAGNOSIS — R53.83 FATIGUE, UNSPECIFIED TYPE: ICD-10-CM

## 2025-08-25 DIAGNOSIS — G47.33 OSA TREATED WITH BIPAP: Primary | ICD-10-CM

## 2025-08-25 DIAGNOSIS — G47.19 EXCESSIVE DAYTIME SLEEPINESS: ICD-10-CM

## 2025-08-25 DIAGNOSIS — R42 DIZZINESS: ICD-10-CM

## 2025-08-25 DIAGNOSIS — R51.9 ACUTE INTRACTABLE HEADACHE, UNSPECIFIED HEADACHE TYPE: ICD-10-CM

## 2025-08-25 PROCEDURE — 1160F RVW MEDS BY RX/DR IN RCRD: CPT | Performed by: NURSE PRACTITIONER

## 2025-08-25 PROCEDURE — 3078F DIAST BP <80 MM HG: CPT | Performed by: NURSE PRACTITIONER

## 2025-08-25 PROCEDURE — 3074F SYST BP LT 130 MM HG: CPT | Performed by: NURSE PRACTITIONER

## 2025-08-25 PROCEDURE — 1159F MED LIST DOCD IN RCRD: CPT | Performed by: NURSE PRACTITIONER

## 2025-08-25 PROCEDURE — 99214 OFFICE O/P EST MOD 30 MIN: CPT | Performed by: NURSE PRACTITIONER

## 2025-08-25 RX ORDER — ARMODAFINIL 150 MG/1
150 TABLET ORAL DAILY
Qty: 30 TABLET | Refills: 2 | Status: SHIPPED | OUTPATIENT
Start: 2025-08-25 | End: 2025-08-26

## 2025-08-26 DIAGNOSIS — G47.19 EXCESSIVE DAYTIME SLEEPINESS: ICD-10-CM

## 2025-08-26 DIAGNOSIS — G47.33 OSA TREATED WITH BIPAP: Primary | ICD-10-CM

## 2025-08-26 RX ORDER — ARMODAFINIL 150 MG/1
150 TABLET ORAL DAILY
Qty: 30 TABLET | Refills: 2 | Status: SHIPPED | OUTPATIENT
Start: 2025-08-26

## (undated) DEVICE — Device

## (undated) DEVICE — CATH F6 ST JR 4 100CM: Brand: SUPERTORQUE

## (undated) DEVICE — ELECTRD PAD DEFIB A/

## (undated) DEVICE — INFLATION DEVICE: Brand: ENCORE™ 26

## (undated) DEVICE — SPNG GZ WOVN 4X4IN 12PLY 10/BX STRL

## (undated) DEVICE — JELLY,LUBE,STERILE,FLIP TOP,TUBE,2-OZ: Brand: MEDLINE

## (undated) DEVICE — TR BAND RADIAL ARTERY COMPRESSION DEVICE: Brand: TR BAND

## (undated) DEVICE — ENDOGATOR AUXILIARY WATER JET CONNECTOR: Brand: ENDOGATOR

## (undated) DEVICE — GLV SURG SENSICARE PI PF LF 7 GRN STRL

## (undated) DEVICE — NDL HYPO ECLPS SFTY 22G 1 1/2IN

## (undated) DEVICE — GLV SURG SENSICARE W/ALOE PF LF 6.5 STRL

## (undated) DEVICE — FRCP BIOP COLD ENDOJAW ALLGTR W/NDL 2.8X2300MM BLU

## (undated) DEVICE — DISPOSABLE TOURNIQUET CUFF SINGLE BLADDER, SINGLE PORT AND QUICK CONNECT CONNECTOR: Brand: COLOR CUFF

## (undated) DEVICE — BNDG ELAS CO-FLEX SLF ADHR 4IN5YD LF STRL

## (undated) DEVICE — NDL ANGIOGR ADV THN SMOTH SGLWALL 21G 1.5

## (undated) DEVICE — GLIDESHEATH BASIC HYDROPHILIC COATED INTRODUCER SHEATH: Brand: GLIDESHEATH

## (undated) DEVICE — ANGIOGRAPHIC CATHETER: Brand: EXPO™

## (undated) DEVICE — PK EXTRM LOWR 20

## (undated) DEVICE — SUT ETHLN 3/0 FS1 663G

## (undated) DEVICE — SYR LL TP 10ML STRL

## (undated) DEVICE — SKIN PREP TRAY 4 COMPARTM TRAY: Brand: MEDLINE INDUSTRIES, INC.

## (undated) DEVICE — STCKNT IMPERV 12IN STRL

## (undated) DEVICE — SNAR POLYP SENSATION STDOVL 27 240 BX40

## (undated) DEVICE — GW INQWIRE FC PTFE STD J/1.5 .035 260

## (undated) DEVICE — BANDAGE,GAUZE,BULKEE II,4.5"X4.1YD,STRL: Brand: MEDLINE

## (undated) DEVICE — DRSNG WND GZ CURAD OIL EMULSION 3X3IN STRL

## (undated) DEVICE — BNDG ESMARK 4IN 9FT LF STRL BLU